# Patient Record
Sex: FEMALE | Race: WHITE | Employment: OTHER | URBAN - METROPOLITAN AREA
[De-identification: names, ages, dates, MRNs, and addresses within clinical notes are randomized per-mention and may not be internally consistent; named-entity substitution may affect disease eponyms.]

---

## 2017-07-19 ENCOUNTER — HOSPITAL ENCOUNTER (OUTPATIENT)
Dept: MAMMOGRAPHY | Age: 82
Discharge: HOME | End: 2017-07-19
Payer: MEDICARE

## 2017-07-19 DIAGNOSIS — Z12.31 VISIT FOR SCREENING MAMMOGRAM: ICD-10-CM

## 2017-07-19 PROCEDURE — G0202 SCR MAMMO BI INCL CAD: HCPCS

## 2018-02-19 ENCOUNTER — HOSPITAL ENCOUNTER (OUTPATIENT)
Dept: GENERAL RADIOLOGY | Age: 83
Discharge: HOME | End: 2018-02-19
Payer: MEDICARE

## 2018-02-19 DIAGNOSIS — R05.9 COUGH: ICD-10-CM

## 2018-02-19 PROCEDURE — 71046 X-RAY EXAM CHEST 2 VIEWS: CPT

## 2018-07-23 ENCOUNTER — HOSPITAL ENCOUNTER (OUTPATIENT)
Dept: MAMMOGRAPHY | Age: 83
Discharge: HOME | End: 2018-07-23

## 2018-07-23 DIAGNOSIS — Z12.39 SCREENING BREAST EXAMINATION: ICD-10-CM

## 2018-07-23 PROCEDURE — 77067 SCR MAMMO BI INCL CAD: CPT

## 2019-07-25 ENCOUNTER — HOSPITAL ENCOUNTER (OUTPATIENT)
Dept: MAMMOGRAPHY | Age: 84
Discharge: HOME | End: 2019-07-25
Payer: MEDICARE

## 2019-07-25 DIAGNOSIS — Z12.39 BREAST SCREENING, UNSPECIFIED: ICD-10-CM

## 2019-07-25 PROCEDURE — 77067 SCR MAMMO BI INCL CAD: CPT

## 2020-07-27 ENCOUNTER — HOSPITAL ENCOUNTER (OUTPATIENT)
Dept: MAMMOGRAPHY | Age: 85
Discharge: HOME | End: 2020-07-27
Payer: MEDICARE

## 2020-07-27 DIAGNOSIS — Z12.31 VISIT FOR SCREENING MAMMOGRAM: ICD-10-CM

## 2020-07-27 PROCEDURE — 77067 SCR MAMMO BI INCL CAD: CPT | Performed by: FAMILY MEDICINE

## 2021-07-07 ENCOUNTER — TRANSCRIBE ORDER (OUTPATIENT)
Dept: SCHEDULING | Age: 86
End: 2021-07-07

## 2021-07-07 DIAGNOSIS — Z12.31 SCREENING MAMMOGRAM FOR HIGH-RISK PATIENT: Primary | ICD-10-CM

## 2021-07-28 ENCOUNTER — HOSPITAL ENCOUNTER (OUTPATIENT)
Dept: MAMMOGRAPHY | Age: 86
Discharge: HOME | End: 2021-07-28
Attending: FAMILY MEDICINE
Payer: MEDICARE

## 2021-07-28 DIAGNOSIS — Z12.31 SCREENING MAMMOGRAM FOR HIGH-RISK PATIENT: ICD-10-CM

## 2021-07-28 PROCEDURE — 77067 SCR MAMMO BI INCL CAD: CPT

## 2022-06-29 ENCOUNTER — TRANSCRIBE ORDER (OUTPATIENT)
Dept: SCHEDULING | Age: 87
End: 2022-06-29

## 2022-06-29 DIAGNOSIS — M85.9 DISORDER OF BONE DENSITY AND STRUCTURE, UNSPECIFIED: Primary | ICD-10-CM

## 2022-07-05 ENCOUNTER — TRANSCRIBE ORDER (OUTPATIENT)
Dept: SCHEDULING | Age: 87
End: 2022-07-05

## 2022-07-05 DIAGNOSIS — M85.89 OSTEOPENIA OF MULTIPLE SITES: Primary | ICD-10-CM

## 2022-07-06 ENCOUNTER — TRANSCRIBE ORDER (OUTPATIENT)
Dept: SCHEDULING | Age: 87
End: 2022-07-06

## 2022-07-06 DIAGNOSIS — Z12.31 ENCOUNTER FOR MAMMOGRAM TO ESTABLISH BASELINE MAMMOGRAM: Primary | ICD-10-CM

## 2022-07-13 ENCOUNTER — HOSPITAL ENCOUNTER (OUTPATIENT)
Dept: MAMMOGRAPHY | Age: 87
Discharge: HOME | End: 2022-07-13
Attending: INTERNAL MEDICINE
Payer: MEDICARE

## 2022-07-13 DIAGNOSIS — M85.89 OSTEOPENIA OF MULTIPLE SITES: ICD-10-CM

## 2022-07-13 PROCEDURE — 77080 DXA BONE DENSITY AXIAL: CPT

## 2022-08-11 ENCOUNTER — HOSPITAL ENCOUNTER (OUTPATIENT)
Dept: MAMMOGRAPHY | Age: 87
Discharge: HOME | End: 2022-08-11
Attending: INTERNAL MEDICINE
Payer: MEDICARE

## 2022-08-11 DIAGNOSIS — Z12.31 ENCOUNTER FOR MAMMOGRAM TO ESTABLISH BASELINE MAMMOGRAM: ICD-10-CM

## 2022-08-11 PROCEDURE — 77063 BREAST TOMOSYNTHESIS BI: CPT

## 2023-02-01 ENCOUNTER — HOSPITAL ENCOUNTER (OUTPATIENT)
Dept: GENERAL RADIOLOGY | Age: 88
Discharge: HOME | End: 2023-02-01
Attending: INTERNAL MEDICINE
Payer: MEDICARE

## 2023-02-01 ENCOUNTER — TRANSCRIBE ORDER (OUTPATIENT)
Dept: GENERAL RADIOLOGY | Age: 88
End: 2023-02-01

## 2023-02-01 DIAGNOSIS — J40 BRONCHITIS: ICD-10-CM

## 2023-02-01 DIAGNOSIS — J40 BRONCHITIS: Primary | ICD-10-CM

## 2023-02-01 PROCEDURE — 71046 X-RAY EXAM CHEST 2 VIEWS: CPT

## 2023-02-02 ENCOUNTER — HOSPITAL ENCOUNTER (OUTPATIENT)
Dept: CT IMAGING | Age: 88
Discharge: HOME | End: 2023-02-02
Attending: INTERNAL MEDICINE
Payer: MEDICARE

## 2023-02-02 ENCOUNTER — APPOINTMENT (OUTPATIENT)
Dept: GENERAL RADIOLOGY | Age: 88
DRG: 177 | End: 2023-02-02
Attending: EMERGENCY MEDICINE
Payer: MEDICARE

## 2023-02-02 ENCOUNTER — HOSPITAL ENCOUNTER (INPATIENT)
Age: 88
LOS: 5 days | Discharge: HOME | DRG: 177 | End: 2023-02-07
Attending: EMERGENCY MEDICINE | Admitting: FAMILY MEDICINE
Payer: MEDICARE

## 2023-02-02 ENCOUNTER — TRANSCRIBE ORDER (OUTPATIENT)
Dept: SCHEDULING | Age: 88
End: 2023-02-02

## 2023-02-02 DIAGNOSIS — Z78.9 FAILURE OF OUTPATIENT TREATMENT: ICD-10-CM

## 2023-02-02 DIAGNOSIS — J90 BILATERAL PLEURAL EFFUSION: ICD-10-CM

## 2023-02-02 DIAGNOSIS — R79.82 CRP ELEVATED: ICD-10-CM

## 2023-02-02 DIAGNOSIS — R93.89 ABNORMAL CHEST X-RAY: ICD-10-CM

## 2023-02-02 DIAGNOSIS — U09.9 POST-ACUTE SEQUELAE OF COVID-19 (PASC): ICD-10-CM

## 2023-02-02 DIAGNOSIS — R93.89 ABNORMAL CHEST X-RAY: Primary | ICD-10-CM

## 2023-02-02 DIAGNOSIS — R79.89 ELEVATED BRAIN NATRIURETIC PEPTIDE (BNP) LEVEL: ICD-10-CM

## 2023-02-02 DIAGNOSIS — J18.9 ATYPICAL PNEUMONIA: Primary | ICD-10-CM

## 2023-02-02 PROBLEM — C91.10 CLL (CHRONIC LYMPHOCYTIC LEUKEMIA) (HCC): Status: ACTIVE | Noted: 2023-02-02

## 2023-02-02 LAB
ALBUMIN SERPL-MCNC: 3.9 G/DL (ref 3.5–5)
ALBUMIN/GLOB SERPL: 1.6 (ref 1.1–2.2)
ALP SERPL-CCNC: 64 U/L (ref 45–117)
ALT SERPL-CCNC: 25 U/L (ref 12–78)
ANION GAP SERPL CALC-SCNC: 9 MMOL/L (ref 5–15)
APPEARANCE UR: CLEAR
AST SERPL-CCNC: 35 U/L (ref 15–37)
BACTERIA URNS QL MICRO: NEGATIVE /HPF
BASOPHILS # BLD: 0 K/UL (ref 0–0.1)
BASOPHILS NFR BLD: 0 % (ref 0–1)
BILIRUB SERPL-MCNC: 0.8 MG/DL (ref 0.2–1)
BILIRUB UR QL: NEGATIVE
BNP SERPL-MCNC: 1062 PG/ML (ref 0–450)
BUN SERPL-MCNC: 18 MG/DL (ref 6–20)
BUN/CREAT SERPL: 23 (ref 12–20)
CALCIUM SERPL-MCNC: 9.9 MG/DL (ref 8.5–10.1)
CHLORIDE SERPL-SCNC: 93 MMOL/L (ref 97–108)
CO2 SERPL-SCNC: 28 MMOL/L (ref 21–32)
COLOR UR: ABNORMAL
CREAT SERPL-MCNC: 0.78 MG/DL (ref 0.55–1.02)
CRP SERPL-MCNC: 14.83 MG/DL
DIFFERENTIAL METHOD BLD: ABNORMAL
EOSINOPHIL # BLD: 0 K/UL (ref 0–0.4)
EOSINOPHIL NFR BLD: 0 % (ref 0–7)
EPITH CASTS URNS QL MICRO: ABNORMAL /LPF
ERYTHROCYTE [DISTWIDTH] IN BLOOD BY AUTOMATED COUNT: 15.2 % (ref 11.5–14.5)
GLOBULIN SER CALC-MCNC: 2.5 G/DL (ref 2–4)
GLUCOSE SERPL-MCNC: 134 MG/DL (ref 65–100)
GLUCOSE UR STRIP.AUTO-MCNC: NEGATIVE MG/DL
HCT VFR BLD AUTO: 34 % (ref 35–47)
HGB BLD-MCNC: 11.4 G/DL (ref 11.5–16)
HGB UR QL STRIP: NEGATIVE
IMM GRANULOCYTES # BLD AUTO: 0 K/UL (ref 0–0.04)
IMM GRANULOCYTES NFR BLD AUTO: 0 % (ref 0–0.5)
KETONES UR QL STRIP.AUTO: NEGATIVE MG/DL
LACTATE BLD-SCNC: 1.17 MMOL/L (ref 0.4–2)
LEUKOCYTE ESTERASE UR QL STRIP.AUTO: ABNORMAL
LYMPHOCYTES # BLD: 1.5 K/UL (ref 0.8–3.5)
LYMPHOCYTES NFR BLD: 28 % (ref 12–49)
MCH RBC QN AUTO: 27.7 PG (ref 26–34)
MCHC RBC AUTO-ENTMCNC: 33.5 G/DL (ref 30–36.5)
MCV RBC AUTO: 82.5 FL (ref 80–99)
MONOCYTES # BLD: 0.3 K/UL (ref 0–1)
MONOCYTES NFR BLD: 7 % (ref 5–13)
NEUTS SEG # BLD: 3.4 K/UL (ref 1.8–8)
NEUTS SEG NFR BLD: 65 % (ref 32–75)
NITRITE UR QL STRIP.AUTO: NEGATIVE
NRBC # BLD: 0 K/UL (ref 0–0.01)
NRBC BLD-RTO: 0 PER 100 WBC
PH UR STRIP: 5.5 (ref 5–8)
PLATELET # BLD AUTO: 265 K/UL (ref 150–400)
PMV BLD AUTO: 9.2 FL (ref 8.9–12.9)
POTASSIUM SERPL-SCNC: 3.7 MMOL/L (ref 3.5–5.1)
PROT SERPL-MCNC: 6.4 G/DL (ref 6.4–8.2)
PROT UR STRIP-MCNC: NEGATIVE MG/DL
RBC # BLD AUTO: 4.12 M/UL (ref 3.8–5.2)
RBC #/AREA URNS HPF: ABNORMAL /HPF (ref 0–5)
SODIUM SERPL-SCNC: 130 MMOL/L (ref 136–145)
SP GR UR REFRACTOMETRY: 1.02 (ref 1–1.03)
TROPONIN I SERPL HS-MCNC: 9 NG/L (ref 0–51)
UR CULT HOLD, URHOLD: NORMAL
UROBILINOGEN UR QL STRIP.AUTO: 1 EU/DL (ref 0.2–1)
WBC # BLD AUTO: 5.3 K/UL (ref 3.6–11)
WBC URNS QL MICRO: ABNORMAL /HPF (ref 0–4)

## 2023-02-02 PROCEDURE — 85025 COMPLETE CBC W/AUTO DIFF WBC: CPT

## 2023-02-02 PROCEDURE — 80053 COMPREHEN METABOLIC PANEL: CPT

## 2023-02-02 PROCEDURE — 87040 BLOOD CULTURE FOR BACTERIA: CPT

## 2023-02-02 PROCEDURE — 81001 URINALYSIS AUTO W/SCOPE: CPT

## 2023-02-02 PROCEDURE — 71250 CT THORAX DX C-: CPT

## 2023-02-02 PROCEDURE — 74011250636 HC RX REV CODE- 250/636: Performed by: EMERGENCY MEDICINE

## 2023-02-02 PROCEDURE — 36415 COLL VENOUS BLD VENIPUNCTURE: CPT

## 2023-02-02 PROCEDURE — 86140 C-REACTIVE PROTEIN: CPT

## 2023-02-02 PROCEDURE — 65270000046 HC RM TELEMETRY

## 2023-02-02 PROCEDURE — 83880 ASSAY OF NATRIURETIC PEPTIDE: CPT

## 2023-02-02 PROCEDURE — 96375 TX/PRO/DX INJ NEW DRUG ADDON: CPT

## 2023-02-02 PROCEDURE — 83605 ASSAY OF LACTIC ACID: CPT

## 2023-02-02 PROCEDURE — 84484 ASSAY OF TROPONIN QUANT: CPT

## 2023-02-02 PROCEDURE — 99285 EMERGENCY DEPT VISIT HI MDM: CPT

## 2023-02-02 PROCEDURE — 74011000250 HC RX REV CODE- 250: Performed by: EMERGENCY MEDICINE

## 2023-02-02 PROCEDURE — 93005 ELECTROCARDIOGRAM TRACING: CPT

## 2023-02-02 PROCEDURE — 96365 THER/PROPH/DIAG IV INF INIT: CPT

## 2023-02-02 RX ADMIN — CEFTRIAXONE SODIUM 1 G: 1 INJECTION, POWDER, FOR SOLUTION INTRAMUSCULAR; INTRAVENOUS at 22:01

## 2023-02-02 RX ADMIN — AZITHROMYCIN MONOHYDRATE 500 MG: 500 INJECTION, POWDER, LYOPHILIZED, FOR SOLUTION INTRAVENOUS at 22:03

## 2023-02-03 LAB
ANION GAP SERPL CALC-SCNC: 7 MMOL/L (ref 5–15)
ATRIAL RATE: 83 BPM
BASOPHILS # BLD: 0 K/UL (ref 0–0.1)
BASOPHILS NFR BLD: 0 % (ref 0–1)
BUN SERPL-MCNC: 12 MG/DL (ref 6–20)
BUN/CREAT SERPL: 17 (ref 12–20)
CALCIUM SERPL-MCNC: 9.3 MG/DL (ref 8.5–10.1)
CALCULATED P AXIS, ECG09: 62 DEGREES
CALCULATED R AXIS, ECG10: 19 DEGREES
CHLORIDE SERPL-SCNC: 97 MMOL/L (ref 97–108)
CO2 SERPL-SCNC: 28 MMOL/L (ref 21–32)
CREAT SERPL-MCNC: 0.72 MG/DL (ref 0.55–1.02)
DIAGNOSIS, 93000: NORMAL
DIFFERENTIAL METHOD BLD: ABNORMAL
EOSINOPHIL # BLD: 0 K/UL (ref 0–0.4)
EOSINOPHIL NFR BLD: 0 % (ref 0–7)
ERYTHROCYTE [DISTWIDTH] IN BLOOD BY AUTOMATED COUNT: 15.2 % (ref 11.5–14.5)
GLUCOSE SERPL-MCNC: 166 MG/DL (ref 65–100)
HCT VFR BLD AUTO: 29.9 % (ref 35–47)
HGB BLD-MCNC: 9.8 G/DL (ref 11.5–16)
IMM GRANULOCYTES # BLD AUTO: 0 K/UL (ref 0–0.04)
IMM GRANULOCYTES NFR BLD AUTO: 0 % (ref 0–0.5)
LYMPHOCYTES # BLD: 1 K/UL (ref 0.8–3.5)
LYMPHOCYTES NFR BLD: 21 % (ref 12–49)
MCH RBC QN AUTO: 27.4 PG (ref 26–34)
MCHC RBC AUTO-ENTMCNC: 32.8 G/DL (ref 30–36.5)
MCV RBC AUTO: 83.5 FL (ref 80–99)
MONOCYTES # BLD: 0.2 K/UL (ref 0–1)
MONOCYTES NFR BLD: 5 % (ref 5–13)
NEUTS SEG # BLD: 3.4 K/UL (ref 1.8–8)
NEUTS SEG NFR BLD: 74 % (ref 32–75)
NRBC # BLD: 0 K/UL (ref 0–0.01)
NRBC BLD-RTO: 0 PER 100 WBC
P-R INTERVAL, ECG05: 200 MS
PLATELET # BLD AUTO: 252 K/UL (ref 150–400)
PMV BLD AUTO: 9.3 FL (ref 8.9–12.9)
POTASSIUM SERPL-SCNC: 3.3 MMOL/L (ref 3.5–5.1)
PROCALCITONIN SERPL-MCNC: 0.05 NG/ML
Q-T INTERVAL, ECG07: 354 MS
QRS DURATION, ECG06: 68 MS
QTC CALCULATION (BEZET), ECG08: 415 MS
RBC # BLD AUTO: 3.58 M/UL (ref 3.8–5.2)
SODIUM SERPL-SCNC: 132 MMOL/L (ref 136–145)
VENTRICULAR RATE, ECG03: 83 BPM
WBC # BLD AUTO: 4.7 K/UL (ref 3.6–11)

## 2023-02-03 PROCEDURE — 74011000258 HC RX REV CODE- 258: Performed by: FAMILY MEDICINE

## 2023-02-03 PROCEDURE — 84145 PROCALCITONIN (PCT): CPT

## 2023-02-03 PROCEDURE — 74011250637 HC RX REV CODE- 250/637: Performed by: EMERGENCY MEDICINE

## 2023-02-03 PROCEDURE — 94761 N-INVAS EAR/PLS OXIMETRY MLT: CPT

## 2023-02-03 PROCEDURE — 74011000250 HC RX REV CODE- 250: Performed by: STUDENT IN AN ORGANIZED HEALTH CARE EDUCATION/TRAINING PROGRAM

## 2023-02-03 PROCEDURE — 74011000250 HC RX REV CODE- 250: Performed by: FAMILY MEDICINE

## 2023-02-03 PROCEDURE — 36415 COLL VENOUS BLD VENIPUNCTURE: CPT

## 2023-02-03 PROCEDURE — 74011636637 HC RX REV CODE- 636/637: Performed by: STUDENT IN AN ORGANIZED HEALTH CARE EDUCATION/TRAINING PROGRAM

## 2023-02-03 PROCEDURE — 65270000046 HC RM TELEMETRY

## 2023-02-03 PROCEDURE — 74011250636 HC RX REV CODE- 250/636: Performed by: FAMILY MEDICINE

## 2023-02-03 PROCEDURE — 80048 BASIC METABOLIC PNL TOTAL CA: CPT

## 2023-02-03 PROCEDURE — 74011250637 HC RX REV CODE- 250/637: Performed by: STUDENT IN AN ORGANIZED HEALTH CARE EDUCATION/TRAINING PROGRAM

## 2023-02-03 PROCEDURE — 85025 COMPLETE CBC W/AUTO DIFF WBC: CPT

## 2023-02-03 PROCEDURE — 74011250637 HC RX REV CODE- 250/637: Performed by: FAMILY MEDICINE

## 2023-02-03 RX ORDER — PREDNISONE 1 MG/1
1 TABLET ORAL EVERY OTHER DAY
Status: DISCONTINUED | OUTPATIENT
Start: 2023-02-03 | End: 2023-02-04

## 2023-02-03 RX ORDER — ACETAMINOPHEN 500 MG
1000 TABLET ORAL
Status: COMPLETED | OUTPATIENT
Start: 2023-02-03 | End: 2023-02-03

## 2023-02-03 RX ORDER — ACETAMINOPHEN 325 MG/1
650 TABLET ORAL
Status: DISCONTINUED | OUTPATIENT
Start: 2023-02-03 | End: 2023-02-06

## 2023-02-03 RX ORDER — ACETAMINOPHEN 325 MG/1
650 TABLET ORAL
Status: DISCONTINUED | OUTPATIENT
Start: 2023-02-03 | End: 2023-02-03

## 2023-02-03 RX ORDER — FUROSEMIDE 10 MG/ML
20 INJECTION INTRAMUSCULAR; INTRAVENOUS DAILY
Status: DISCONTINUED | OUTPATIENT
Start: 2023-02-04 | End: 2023-02-04

## 2023-02-03 RX ORDER — ENOXAPARIN SODIUM 100 MG/ML
40 INJECTION SUBCUTANEOUS DAILY
Status: DISCONTINUED | OUTPATIENT
Start: 2023-02-04 | End: 2023-02-07 | Stop reason: HOSPADM

## 2023-02-03 RX ORDER — FENOFIBRATE 160 MG/1
160 TABLET ORAL DAILY
Status: DISCONTINUED | OUTPATIENT
Start: 2023-02-04 | End: 2023-02-07 | Stop reason: HOSPADM

## 2023-02-03 RX ORDER — LOSARTAN POTASSIUM 50 MG/1
100 TABLET ORAL DAILY
Status: DISCONTINUED | OUTPATIENT
Start: 2023-02-04 | End: 2023-02-04

## 2023-02-03 RX ORDER — SODIUM CHLORIDE 0.9 % (FLUSH) 0.9 %
5-40 SYRINGE (ML) INJECTION EVERY 8 HOURS
Status: DISCONTINUED | OUTPATIENT
Start: 2023-02-03 | End: 2023-02-07 | Stop reason: HOSPADM

## 2023-02-03 RX ORDER — PANTOPRAZOLE SODIUM 40 MG/1
40 TABLET, DELAYED RELEASE ORAL
Status: DISCONTINUED | OUTPATIENT
Start: 2023-02-03 | End: 2023-02-07 | Stop reason: HOSPADM

## 2023-02-03 RX ORDER — ONDANSETRON 2 MG/ML
4 INJECTION INTRAMUSCULAR; INTRAVENOUS
Status: DISCONTINUED | OUTPATIENT
Start: 2023-02-03 | End: 2023-02-07 | Stop reason: HOSPADM

## 2023-02-03 RX ORDER — ONDANSETRON 4 MG/1
4 TABLET, ORALLY DISINTEGRATING ORAL
Status: DISCONTINUED | OUTPATIENT
Start: 2023-02-03 | End: 2023-02-07 | Stop reason: HOSPADM

## 2023-02-03 RX ORDER — FUROSEMIDE 10 MG/ML
20 INJECTION INTRAMUSCULAR; INTRAVENOUS ONCE
Status: COMPLETED | OUTPATIENT
Start: 2023-02-03 | End: 2023-02-03

## 2023-02-03 RX ORDER — GUAIFENESIN 100 MG/5ML
81 LIQUID (ML) ORAL DAILY
Status: DISCONTINUED | OUTPATIENT
Start: 2023-02-04 | End: 2023-02-07 | Stop reason: HOSPADM

## 2023-02-03 RX ORDER — POLYETHYLENE GLYCOL 3350 17 G/17G
17 POWDER, FOR SOLUTION ORAL
Status: DISCONTINUED | OUTPATIENT
Start: 2023-02-03 | End: 2023-02-07 | Stop reason: HOSPADM

## 2023-02-03 RX ORDER — SODIUM CHLORIDE 0.9 % (FLUSH) 0.9 %
5-40 SYRINGE (ML) INJECTION AS NEEDED
Status: DISCONTINUED | OUTPATIENT
Start: 2023-02-03 | End: 2023-02-07 | Stop reason: HOSPADM

## 2023-02-03 RX ORDER — ACETAMINOPHEN 650 MG/1
650 SUPPOSITORY RECTAL
Status: DISCONTINUED | OUTPATIENT
Start: 2023-02-03 | End: 2023-02-06

## 2023-02-03 RX ORDER — ACETAMINOPHEN 500 MG
500 TABLET ORAL
Status: DISCONTINUED | OUTPATIENT
Start: 2023-02-03 | End: 2023-02-03

## 2023-02-03 RX ADMIN — ACETAMINOPHEN 650 MG: 325 TABLET ORAL at 18:52

## 2023-02-03 RX ADMIN — CEFEPIME 2 G: 2 INJECTION, POWDER, FOR SOLUTION INTRAVENOUS at 10:21

## 2023-02-03 RX ADMIN — PANTOPRAZOLE SODIUM 40 MG: 40 TABLET, DELAYED RELEASE ORAL at 22:33

## 2023-02-03 RX ADMIN — SODIUM CHLORIDE, PRESERVATIVE FREE 10 ML: 5 INJECTION INTRAVENOUS at 22:37

## 2023-02-03 RX ADMIN — ALUMINUM HYDROXIDE AND MAGNESIUM CARBONATE 15 ML: 95; 358 LIQUID ORAL at 22:33

## 2023-02-03 RX ADMIN — ACETAMINOPHEN 1000 MG: 500 TABLET, FILM COATED ORAL at 02:27

## 2023-02-03 RX ADMIN — ACETAMINOPHEN 650 MG: 325 TABLET ORAL at 12:33

## 2023-02-03 RX ADMIN — POLYETHYLENE GLYCOL 3350 17 G: 17 POWDER, FOR SOLUTION ORAL at 22:33

## 2023-02-03 RX ADMIN — CEFEPIME 2 G: 2 INJECTION, POWDER, FOR SOLUTION INTRAVENOUS at 22:33

## 2023-02-03 RX ADMIN — SODIUM CHLORIDE, PRESERVATIVE FREE 10 ML: 5 INJECTION INTRAVENOUS at 17:49

## 2023-02-03 RX ADMIN — PREDNISONE 1 MG: 1 TABLET ORAL at 17:49

## 2023-02-03 RX ADMIN — FUROSEMIDE 20 MG: 10 INJECTION, SOLUTION INTRAMUSCULAR; INTRAVENOUS at 10:21

## 2023-02-03 NOTE — PROGRESS NOTES
Day #1 of cefepime  Indication:  CAP  Current regimen:  1 g Q8H  Abx regimen: cefepime  Recent Labs     23  1938   WBC 5.3   CREA 0.78   BUN 18     Est CrCl: 36 ml/min; UO: --- ml/kg/hr  Temp (24hrs), Av.5 °F (36.9 °C), Min:97.3 °F (36.3 °C), Max:100.6 °F (38.1 °C)      Plan: Change to cefepime 2 g Q12H

## 2023-02-03 NOTE — ED NOTES
1300: [facility] on scene to transport patient. 1315: Spoke to hospitalist in regards to antibiotics. Dr. Brie Torres is aware. 1317: Verbal report given to [facility]. [facility] in possession of EMTALA, ED summary, ED facesheet. Patient is resting comfortably on stretcher. 1327:Patient has now left the department.

## 2023-02-03 NOTE — ED NOTES
Verbal shift change report given to Lilly Walker RN (oncoming nurse) by Cristina Joseph RN (offgoing nurse). Report included the following information SBAR, ED Summary, MAR, and Recent Results.

## 2023-02-03 NOTE — PROGRESS NOTES
Pt in bed A/O x4, no distress noted. IV site clean dry and intact. All meds given during shift per MD orders. No complaint of pain during shift. Pt complained of fever with temp 100.3F. Tp given antipyretic meds. Pt stated understanding of education to call for pain medication as needed. Will continue to monitor. Awaiting oncoming nurse for shift change report.

## 2023-02-03 NOTE — ED TRIAGE NOTES
Patient reports went to see her doctor today and was diagnosed with pneumonia in both lungs after having CT. This morning had fever 102, took Tylenol. Patient has Hx of CLL. Patient was diagnosed with bronchitis and UTI, takes Doxy since Monday. Patient is ambulatory in triage. Denies CP, SOB.

## 2023-02-03 NOTE — ED NOTES
TRANSFER - OUT REPORT:    Verbal report given to IP RN(name) on Jemma Harden  being transferred to (unit) for routine progression of care       Report consisted of patients Situation, Background, Assessment and   Recommendations(SBAR). Information from the following report(s) SBAR, ED Summary, MAR, and Recent Results was reviewed with the receiving nurse. Lines:   Peripheral IV 02/02/23 Anterior;Distal;Right Wrist (Active)       Peripheral IV 02/02/23 Left Wrist (Active)   Site Assessment Clean, dry, & intact 02/02/23 2135   Phlebitis Assessment 0 02/02/23 2135   Infiltration Assessment 0 02/02/23 2135   Dressing Status Clean, dry, & intact 02/02/23 2135   Dressing Type Transparent 02/02/23 2135   Hub Color/Line Status Pink 02/02/23 2135   Action Taken Blood drawn 02/02/23 2135        Opportunity for questions and clarification was provided.       Patient transported with:   Monitor  O2 @ 2 liters

## 2023-02-03 NOTE — ED NOTES
Family at bedside. Family brought food for patient consisting of a banana, yogurt, blueberry muffin, and tea.

## 2023-02-03 NOTE — ED NOTES
Attempted to administer antibiotics at this time, patient's family members requesting to speak to MD Wyatt Hollins prior. MD Wyatt Hollins aware.

## 2023-02-03 NOTE — H&P
[address] [facility] Adult  Hospitalist Group  History and Physical    Date of Service:  2/3/2023  Primary Care Provider: Nasim Dinero MD  Source of information: The patient, Chart review, and Spouse/family member    Chief Complaint: No chief complaint on file. History of Presenting Illness:   Darien Cruz is a 80 y.o. female who presents with fevers. 80-year-old  female with past medical history of CLL who follows with oncology and COVID diagnosed 1 month ago who was transferred here for the above. Patient reports that she has been having on and off fevers for about a week. She reports that these fevers are at night or in the morning and if ranged to a temperature max of 102 °F.  She reports she had COVID 1 month ago and was told by her primary doctor that if she had fevers and shortness of breath to go to an ER. She reports that she has been having increased cough along with her fevers, for about 3 weeks now. She denies any hemoptysis. She does report some productive cough since having had COVID. She and her family report that she has been started on doxycycline by her primary care provider for concern for pneumonia and possible UTI. She did have a CT yesterday which showed patchy airspace disease and bilateral pleural effusions. She reports increased shortness of breath with exertion but not at rest.  She denies any hypoxia, syncope, chest pain, palpitations. At the time of my exam, patient is laying comfortably in bed with family at bedside. No acute complaints at this time. Patient was found to have elevated BNP at 1062 and, as mentioned before, CT showing scarring versus inflammatory versus infectious and pleural effusion.     The patient denies any headache, blurry vision, sore throat, trouble swallowing, trouble with speech, chest pain, SOB, cough, fever, chills, N/V/D, abd pain, urinary symptoms, constipation, recent travels, sick contacts, focal or generalized neurological symptoms, falls, injuries, rashes, contact with COVID-19 diagnosed patients, hematemesis, melena, hemoptysis, hematuria, rashes, denies starting any new medications and denies any other concerns or problems besides as mentioned above. REVIEW OF SYSTEMS:  A comprehensive review of systems was negative except for that written in the History of Present Illness. Past Medical History:   Diagnosis Date    CLL (chronic lymphocytic leukemia) ([facility] [address]     History of chemotherapy     For chronic lymphacytic leukemia    Menopause 1986      Past Surgical History:   Procedure Laterality Date    HX BREAST BIOPSY Bilateral     surgical-benign    HX HYSTERECTOMY  1986    HX OOPHORECTOMY Bilateral 1986     Prior to Admission medications    Not on File     Allergies   Allergen Reactions    Penicillins Anaphylaxis    Ciprofloxacin Myalgia      History reviewed. No pertinent family history. Social History:  reports that she has never smoked. She has never used smokeless tobacco.   Social Determinants of Health     Tobacco Use: Low Risk     Smoking Tobacco Use: Never    Smokeless Tobacco Use: Never    Passive Exposure: Not on file   Alcohol Use: Not on file   Financial Resource Strain: Not on file   Food Insecurity: Not on file   Transportation Needs: Not on file   Physical Activity: Not on file   Stress: Not on file   Social Connections: Not on file   Intimate Partner Violence: Not on file   Depression: Not on file   Housing Stability: Not on file        Medications were reconciled to the best of my ability given all available resources at the time of admission. Route is PO if not otherwise noted. Family and social history were personally reviewed, all pertinent and relevant details are outlined as above.     Objective:   Visit Vitals  /60   Pulse 95   Temp (!) 102 °F (38.9 °C)   Resp 29   Ht 4' 11\" (1.499 m)   Wt 53.8 kg (118 lb 9.7 oz)   SpO2 97%   BMI 23.96 kg/m²    O2 Flow Rate (L/min): 2 l/min O2 Device: Nasal cannula    PHYSICAL EXAM:   General: Alert x oriented x 3, awake, no acute distress,   HEENT: PEERL, EOMI, moist mucus membranes  Neck: Supple, no JVD, no meningeal signs  Chest: Coarse breath sounds with bilateral crackles. No wheezing. No rhonchi. No respiratory distress. No tachypnea.   CVS: RRR, S1 S2 heard, no murmurs/rubs/gallops  Abd: Soft, non-tender, non-distended, +bowel sounds   Ext: No clubbing, no cyanosis, no edema  Neuro/Psych: Pleasant mood and affect, CN 2-12 grossly intact, sensory grossly within normal limit, Strength 5/5 in all extremities, DTR 1+ x 4  Cap refill: Brisk, less than 3 seconds  Pulses: 2+, symmetric in all extremities  Skin: Warm, dry, without rashes or lesions    Data Review:   I have independently reviewed and interpreted patient's lab and all other diagnostic data    Abnormal Labs Reviewed   CBC WITH AUTOMATED DIFF - Abnormal; Notable for the following components:       Result Value    HGB 11.4 (*)     HCT 34.0 (*)     RDW 15.2 (*)     All other components within normal limits   METABOLIC PANEL, COMPREHENSIVE - Abnormal; Notable for the following components:    Sodium 130 (*)     Chloride 93 (*)     Glucose 134 (*)     BUN/Creatinine ratio 23 (*)     All other components within normal limits   NT-PRO BNP - Abnormal; Notable for the following components:    NT pro-BNP 1,062 (*)     All other components within normal limits   URINALYSIS W/MICROSCOPIC - Abnormal; Notable for the following components:    Leukocyte Esterase SMALL (*)     Epithelial cells MODERATE (*)     All other components within normal limits   C REACTIVE PROTEIN, QT - Abnormal; Notable for the following components:    C-Reactive protein 14.83 (*)     All other components within normal limits   CBC WITH AUTOMATED DIFF - Abnormal; Notable for the following components:    RBC 3.58 (*)     HGB 9.8 (*)     HCT 29.9 (*)     RDW 15.2 (*)     All other components within normal limits   METABOLIC PANEL, BASIC - Abnormal; Notable for the following components:    Sodium 132 (*)     Potassium 3.3 (*)     Glucose 166 (*)     All other components within normal limits       All Micro Results       Procedure Component Value Units Date/Time    CULTURE, BLOOD, PAIRED [[id number]] Collected: 02/02/23 2132    Order Status: Sent Specimen: Blood Updated: 02/03/23 0730    CULTURE, BLOOD, PAIRED [[id number]]     Order Status: Canceled Specimen: Blood     URINE CULTURE HOLD SAMPLE [[id number]] Collected: 02/02/23 1956    Order Status: Completed Specimen: Urine Updated: 02/02/23 2020     Urine culture hold       Urine on hold in Microbiology dept for 2 days. If unpreserved urine is submitted, it cannot be used for addtional testing after 24 hours, recollection will be required. COVID-19 RAPID TEST [[id number]]     Order Status: Canceled             IMAGING:   No orders to display        ECG/ECHO:    Results for orders placed or performed during the hospital encounter of 02/02/23   EKG, 12 LEAD, INITIAL   Result Value Ref Range    Ventricular Rate 83 BPM    Atrial Rate 83 BPM    P-R Interval 200 ms    QRS Duration 68 ms    Q-T Interval 354 ms    QTC Calculation (Bezet) 415 ms    Calculated P Axis 62 degrees    Calculated R Axis 19 degrees    Diagnosis       Normal sinus rhythm  Nonspecific ST abnormality    No previous ECGs available  Confirmed by Tatum Camp M.D., Janessa Prophet ([id number]) on 2/3/2023 4:02:21 PM            Notes reviewed from all clinical/nonclinical/nursing services involved in patient's clinical care. Care coordination discussions were held with appropriate clinical/nonclinical/ nursing providers based on care coordination needs. Assessment:   Given the patient's current clinical presentation, there is a high level of concern for decompensation if discharged from the emergency department.  Complex decision making was performed, which includes reviewing the patient's available past medical records, laboratory results, and imaging studies. 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)        Plan:     Likely atypical CAP with failed outpatient treatment  Previous COVID infection likely influencing  Patient was given cefepime we will continue on cefepime for now  Procalcitonin ordered  Continuous pulse ox  Monitor with labs  CT shows bilateral groundglass opacities as well  Consider sequelae of COVID    Bilateral pleural effusion, some concern for heart failure  Elevated BNP  Echocardiogram ordered  We will consider cardiology consult based on results  We will consider starting diuretics though patient at this time is not short of breath, not hypoxic, and not showing signs of overt fluid overload    CLL  Follows with [facility]  Recently saw her oncologist  Monitor with labs  CT shows worsening disease but her comparison is 2014  Continue patient's prednisone as ordered by her oncologist  Consider oncology consult for potentially worsening disease though I do not have a comparison that is within an acceptable timeframe    Previous COVID-19 infection  Patient reports that she has remained positive since her infection     Constipation  MiraLAX      DIET: ADULT DIET Regular   ISOLATION PRECAUTIONS: There are currently no Active Isolations  CODE STATUS: DNR   DVT PROPHYLAXIS: Lovenox  FUNCTIONAL STATUS PRIOR TO HOSPITALIZATION: Fully active and ambulatory; able to carry on all self-care without restriction. Ambulatory status/function: By self   EARLY MOBILITY ASSESSMENT: Recommend routine ambulation while hospitalized with the assistance of nursing staff  ANTICIPATED DISCHARGE: 24-48 hours.   ANTICIPATED DISPOSITION: Home  EMERGENCY CONTACT/SURROGATE DECISION MAKER: Daughter    CRITICAL CARE WAS PERFORMED FOR THIS ENCOUNTER: NO.      Signed By: Mona Cunningham MD     February 3, 2023         Please note that this dictation may have been completed with Dragon, the computer voice recognition software. Quite often unanticipated grammatical, syntax, homophones, and other interpretive errors are inadvertently transcribed by the computer software. Please disregard these errors. Please excuse any errors that have escaped final proofreading.

## 2023-02-03 NOTE — ED NOTES
This RN was speaking to family at bedside, and family began to discuss allergies. Family reported patient has allergies to penicillins. Allergy information was updated by this RN. It was found that antibiotic administered is related to penicillins. MD made aware of this. Patient shows no signs of distress at this time. Will continue to observe patient and progression of care.

## 2023-02-04 ENCOUNTER — APPOINTMENT (OUTPATIENT)
Dept: NON INVASIVE DIAGNOSTICS | Age: 88
DRG: 177 | End: 2023-02-04
Attending: STUDENT IN AN ORGANIZED HEALTH CARE EDUCATION/TRAINING PROGRAM
Payer: MEDICARE

## 2023-02-04 ENCOUNTER — APPOINTMENT (OUTPATIENT)
Dept: GENERAL RADIOLOGY | Age: 88
DRG: 177 | End: 2023-02-04
Attending: STUDENT IN AN ORGANIZED HEALTH CARE EDUCATION/TRAINING PROGRAM
Payer: MEDICARE

## 2023-02-04 LAB
ANION GAP SERPL CALC-SCNC: 7 MMOL/L (ref 5–15)
B PERT DNA SPEC QL NAA+PROBE: NOT DETECTED
BORDETELLA PARAPERTUSSIS PCR, BORPAR: NOT DETECTED
BUN SERPL-MCNC: 10 MG/DL (ref 6–20)
BUN/CREAT SERPL: 17 (ref 12–20)
C PNEUM DNA SPEC QL NAA+PROBE: NOT DETECTED
CALCIUM SERPL-MCNC: 9.5 MG/DL (ref 8.5–10.1)
CHLORIDE SERPL-SCNC: 95 MMOL/L (ref 97–108)
CO2 SERPL-SCNC: 26 MMOL/L (ref 21–32)
CREAT SERPL-MCNC: 0.58 MG/DL (ref 0.55–1.02)
FLUAV H1 2009 PAND RNA SPEC QL NAA+PROBE: NOT DETECTED
FLUAV H1 RNA SPEC QL NAA+PROBE: NOT DETECTED
FLUAV H3 RNA SPEC QL NAA+PROBE: NOT DETECTED
FLUAV SUBTYP SPEC NAA+PROBE: NOT DETECTED
FLUBV RNA SPEC QL NAA+PROBE: NOT DETECTED
GLUCOSE SERPL-MCNC: 101 MG/DL (ref 65–100)
HADV DNA SPEC QL NAA+PROBE: NOT DETECTED
HCOV 229E RNA SPEC QL NAA+PROBE: NOT DETECTED
HCOV HKU1 RNA SPEC QL NAA+PROBE: NOT DETECTED
HCOV NL63 RNA SPEC QL NAA+PROBE: NOT DETECTED
HCOV OC43 RNA SPEC QL NAA+PROBE: NOT DETECTED
HMPV RNA SPEC QL NAA+PROBE: NOT DETECTED
HPIV1 RNA SPEC QL NAA+PROBE: NOT DETECTED
HPIV2 RNA SPEC QL NAA+PROBE: NOT DETECTED
HPIV3 RNA SPEC QL NAA+PROBE: NOT DETECTED
HPIV4 RNA SPEC QL NAA+PROBE: NOT DETECTED
LACTATE SERPL-SCNC: 1.7 MMOL/L (ref 0.4–2)
M PNEUMO DNA SPEC QL NAA+PROBE: NOT DETECTED
POTASSIUM SERPL-SCNC: 3.9 MMOL/L (ref 3.5–5.1)
RSV RNA SPEC QL NAA+PROBE: NOT DETECTED
RV+EV RNA SPEC QL NAA+PROBE: NOT DETECTED
SARS-COV-2 RNA RESP QL NAA+PROBE: DETECTED
SODIUM SERPL-SCNC: 128 MMOL/L (ref 136–145)

## 2023-02-04 PROCEDURE — 93005 ELECTROCARDIOGRAM TRACING: CPT

## 2023-02-04 PROCEDURE — 36415 COLL VENOUS BLD VENIPUNCTURE: CPT

## 2023-02-04 PROCEDURE — 93306 TTE W/DOPPLER COMPLETE: CPT

## 2023-02-04 PROCEDURE — 74011000250 HC RX REV CODE- 250: Performed by: STUDENT IN AN ORGANIZED HEALTH CARE EDUCATION/TRAINING PROGRAM

## 2023-02-04 PROCEDURE — 74011250637 HC RX REV CODE- 250/637: Performed by: STUDENT IN AN ORGANIZED HEALTH CARE EDUCATION/TRAINING PROGRAM

## 2023-02-04 PROCEDURE — 97161 PT EVAL LOW COMPLEX 20 MIN: CPT

## 2023-02-04 PROCEDURE — 0202U NFCT DS 22 TRGT SARS-COV-2: CPT

## 2023-02-04 PROCEDURE — 65270000046 HC RM TELEMETRY

## 2023-02-04 PROCEDURE — 71045 X-RAY EXAM CHEST 1 VIEW: CPT

## 2023-02-04 PROCEDURE — 86738 MYCOPLASMA ANTIBODY: CPT

## 2023-02-04 PROCEDURE — 74011250636 HC RX REV CODE- 250/636: Performed by: FAMILY MEDICINE

## 2023-02-04 PROCEDURE — 74011250636 HC RX REV CODE- 250/636: Performed by: STUDENT IN AN ORGANIZED HEALTH CARE EDUCATION/TRAINING PROGRAM

## 2023-02-04 PROCEDURE — 97116 GAIT TRAINING THERAPY: CPT

## 2023-02-04 PROCEDURE — 80048 BASIC METABOLIC PNL TOTAL CA: CPT

## 2023-02-04 PROCEDURE — 97535 SELF CARE MNGMENT TRAINING: CPT

## 2023-02-04 PROCEDURE — 83605 ASSAY OF LACTIC ACID: CPT

## 2023-02-04 PROCEDURE — 97165 OT EVAL LOW COMPLEX 30 MIN: CPT

## 2023-02-04 PROCEDURE — 74011000258 HC RX REV CODE- 258: Performed by: FAMILY MEDICINE

## 2023-02-04 PROCEDURE — 74011250637 HC RX REV CODE- 250/637: Performed by: INTERNAL MEDICINE

## 2023-02-04 RX ORDER — SODIUM CHLORIDE 9 MG/ML
75 INJECTION, SOLUTION INTRAVENOUS CONTINUOUS
Status: DISCONTINUED | OUTPATIENT
Start: 2023-02-04 | End: 2023-02-04

## 2023-02-04 RX ORDER — METOPROLOL TARTRATE 25 MG/1
12.5 TABLET, FILM COATED ORAL 2 TIMES DAILY
Status: DISCONTINUED | OUTPATIENT
Start: 2023-02-04 | End: 2023-02-07 | Stop reason: HOSPADM

## 2023-02-04 RX ORDER — LOSARTAN POTASSIUM 50 MG/1
50 TABLET ORAL DAILY
Status: DISCONTINUED | OUTPATIENT
Start: 2023-02-05 | End: 2023-02-07 | Stop reason: HOSPADM

## 2023-02-04 RX ORDER — SODIUM CHLORIDE 9 MG/ML
50 INJECTION, SOLUTION INTRAVENOUS CONTINUOUS
Status: DISCONTINUED | OUTPATIENT
Start: 2023-02-04 | End: 2023-02-04

## 2023-02-04 RX ORDER — DEXAMETHASONE 4 MG/1
6 TABLET ORAL DAILY
Status: DISCONTINUED | OUTPATIENT
Start: 2023-02-04 | End: 2023-02-07 | Stop reason: HOSPADM

## 2023-02-04 RX ORDER — FUROSEMIDE 10 MG/ML
20 INJECTION INTRAMUSCULAR; INTRAVENOUS 2 TIMES DAILY
Status: DISPENSED | OUTPATIENT
Start: 2023-02-04 | End: 2023-02-06

## 2023-02-04 RX ADMIN — CEFEPIME 2 G: 2 INJECTION, POWDER, FOR SOLUTION INTRAVENOUS at 12:25

## 2023-02-04 RX ADMIN — ACETAMINOPHEN 650 MG: 325 TABLET ORAL at 01:28

## 2023-02-04 RX ADMIN — FENOFIBRATE 160 MG: 160 TABLET ORAL at 08:21

## 2023-02-04 RX ADMIN — ACETAMINOPHEN 650 MG: 325 TABLET ORAL at 14:33

## 2023-02-04 RX ADMIN — DEXAMETHASONE 6 MG: 4 TABLET ORAL at 17:57

## 2023-02-04 RX ADMIN — ACETAMINOPHEN 650 MG: 325 TABLET ORAL at 20:30

## 2023-02-04 RX ADMIN — LOSARTAN POTASSIUM 100 MG: 50 TABLET, FILM COATED ORAL at 08:21

## 2023-02-04 RX ADMIN — FUROSEMIDE 20 MG: 10 INJECTION, SOLUTION INTRAMUSCULAR; INTRAVENOUS at 08:21

## 2023-02-04 RX ADMIN — SODIUM CHLORIDE, PRESERVATIVE FREE 10 ML: 5 INJECTION INTRAVENOUS at 16:45

## 2023-02-04 RX ADMIN — ALUMINUM HYDROXIDE AND MAGNESIUM CARBONATE 15 ML: 95; 358 LIQUID ORAL at 22:40

## 2023-02-04 RX ADMIN — METOPROLOL TARTRATE 12.5 MG: 25 TABLET, FILM COATED ORAL at 17:57

## 2023-02-04 RX ADMIN — SODIUM CHLORIDE, PRESERVATIVE FREE 10 ML: 5 INJECTION INTRAVENOUS at 05:46

## 2023-02-04 RX ADMIN — ASPIRIN 81 MG: 81 TABLET, CHEWABLE ORAL at 08:21

## 2023-02-04 RX ADMIN — POLYETHYLENE GLYCOL 3350 17 G: 17 POWDER, FOR SOLUTION ORAL at 22:40

## 2023-02-04 RX ADMIN — ENOXAPARIN SODIUM 40 MG: 100 INJECTION SUBCUTANEOUS at 08:22

## 2023-02-04 RX ADMIN — SODIUM CHLORIDE 50 ML/HR: 9 INJECTION, SOLUTION INTRAVENOUS at 16:44

## 2023-02-04 RX ADMIN — ACETAMINOPHEN 650 MG: 325 TABLET ORAL at 08:21

## 2023-02-04 RX ADMIN — CEFEPIME 2 G: 2 INJECTION, POWDER, FOR SOLUTION INTRAVENOUS at 22:40

## 2023-02-04 RX ADMIN — PANTOPRAZOLE SODIUM 40 MG: 40 TABLET, DELAYED RELEASE ORAL at 22:40

## 2023-02-04 RX ADMIN — SODIUM CHLORIDE, PRESERVATIVE FREE 10 ML: 5 INJECTION INTRAVENOUS at 22:40

## 2023-02-04 RX ADMIN — AZITHROMYCIN MONOHYDRATE 500 MG: 500 INJECTION, POWDER, LYOPHILIZED, FOR SOLUTION INTRAVENOUS at 16:44

## 2023-02-04 NOTE — PROGRESS NOTES
RN got notified by Tele monitor about pt having SVT in frequent intervals, which converts to sinus tach. Pt had a temp of 100.2 F, antipyretic administered. 12 lead EKG done. MD Nichols Listen notified. Awaiting orders from MD. Will continue to monitor. none

## 2023-02-04 NOTE — PROGRESS NOTES
OCCUPATIONAL THERAPY EVALUATION/DISCHARGE  Patient: Judie Garcia (77 y.o. female)  Date: 2/4/2023  Primary Diagnosis: Atypical pneumonia [J18.9]  Failure of outpatient treatment [Z78.9]  Elevated brain natriuretic peptide (BNP) level [R79.89]  Elevated C-reactive protein (CRP) [R79.82]  Bilateral pleural effusion [J90]  Post-acute sequelae of COVID-19 (PASC) [U09.9]  CLL (chronic lymphocytic leukemia) (HCC) [C91.10]       Precautions:   Fall    ASSESSMENT  Based on the objective data described below, the patient presents at her functional baseline of mod I to independent s/p admission for fever and generalized weakness. Pt received semi-supine with supportive family at bedside, and agreeable to participation in therapy session. Pt reports being highly independent PTA, completing all ADL/IADL tasks without difficulty. She demonstrated all bed mobility, functional transfers, and OOB ADLs with mod I today. Pt found on 2L NC, daughter removing when transferring to EOB, and SpO2 remaining stable on RA throughout remainder of session. Slight decrease in DBP noted with activity and sitting up in chair, however pt asymptomatic and MAP stable. As pt is at her functional baseline acute OT will sign off. Recommend d/c home when medically stable with no skilled follow up OT needs indicated at this time. Current Level of Function (ADLs/self-care): mod I     Functional Outcome Measure: The patient scored 100/100 on the Barthel Index outcome measure which is indicative of independence in basic self care. Other factors to consider for discharge: PLOF, supportive family who lives locally     PLAN :    Recommendation for discharge: (in order for the patient to meet his/her long term goals)  No skilled occupational therapy/ follow up rehabilitation needs identified at this time.     This discharge recommendation:  Has been made in collaboration with the attending provider and/or case management    IF patient discharges home will need the following DME: none       SUBJECTIVE:   Patient stated I usually cut 7 acres of grass!     OBJECTIVE DATA SUMMARY:   HISTORY:   Past Medical History:   Diagnosis Date    CLL (chronic lymphocytic leukemia) ([facility] [address]     History of chemotherapy     For chronic lymphacytic leukemia    Menopause 1986     Past Surgical History:   Procedure Laterality Date    HX BREAST BIOPSY Bilateral     surgical-benign    HX HYSTERECTOMY  1986    HX OOPHORECTOMY Bilateral 1986       Prior Level of Function/Environment/Context:   Expanded or extensive additional review of patient history:   Home Situation  Home Environment: Private residence  # Steps to Enter: 7  Rails to Enter: Yes  Hand Rails : Bilateral  One/Two Story Residence: Two story  # of Interior Steps: 15  Interior Rails: Right  Living Alone: Yes  Support Systems: Child(latoya)  Patient Expects to be Discharged to[de-identified] Home with family assistance  Current DME Used/Available at Home: None  Tub or Shower Type: Tub/Shower combination    Hand dominance: Left    EXAMINATION OF PERFORMANCE DEFICITS:  Cognitive/Behavioral Status:  Neurologic State: Alert  Orientation Level: Oriented X4  Cognition: Follows commands  Perception: Appears intact  Perseveration: No perseveration noted  Safety/Judgement: Fall prevention;Home safety    Skin: visually intact     Edema: none noted     Hearing: Auditory  Auditory Impairment: Hard of hearing, bilateral    Vision/Perceptual:                           Acuity: Within Defined Limits;Able to read clock/calendar on wall without difficulty; Able to read employee name badge without difficulty         Range of Motion:    AROM: Within functional limits                         Strength:    Strength:  Within functional limits                Coordination:  Coordination: Within functional limits            Tone & Sensation:    Tone: Normal  Sensation: Intact                      Balance:  Sitting: Intact  Standing: Intact    Functional Mobility and Transfers for ADLs:  Bed Mobility:  Supine to Sit: Modified independent  Scooting: Modified independent    Transfers:  Sit to Stand: Modified independent  Stand to Sit: Modified independent  Bathroom Mobility: Modified independent  Toilet Transfer : Modified independent    ADL Assessment:  Feeding: Independent    Oral Facial Hygiene/Grooming: Modified Independent    Bathing: Modified independent (inferred)         Upper Body Dressing: Modified independent    Lower Body Dressing: Modified independent    Toileting: Modified independent                ADL Intervention and task modifications:  Feeding  Feeding Assistance: Independent  Food to Mouth: Independent  Drink to Mouth: Independent    Grooming  Grooming Assistance: Modified independent  Position Performed: Standing  Washing Face: Modified independent  Washing Hands: Modified independent  Brushing/Combing Hair: Modified independent         Upper Body Dressing Assistance  Front Opened Shirt: Modified independent    Lower Body Dressing Assistance  Socks: Modified independent  Position Performed: Seated edge of bed    Toileting  Toileting Assistance: Modified independent  Bladder Hygiene: Modified independent  Bowel Hygiene: Modified indpendent  Clothing Management: Modified independent    Cognitive Retraining  Safety/Judgement: Fall prevention;Home safety    Functional Measure:    Barthel Index:  Bathin  Bladder: 10  Bowels: 10  Groomin  Dressing: 10  Feeding: 10  Mobility: 15  Stairs: 10  Toilet Use: 10  Transfer (Bed to Chair and Back): 15  Total: 100/100      The Barthel ADL Index: Guidelines  1. The index should be used as a record of what a patient does, not as a record of what a patient could do. 2. The main aim is to establish degree of independence from any help, physical or verbal, however minor and for whatever reason. 3. The need for supervision renders the patient not independent.   4. A patient's performance should be established using the best available evidence. Asking the patient, friends/relatives and nurses are the usual sources, but direct observation and common sense are also important. However direct testing is not needed. 5. Usually the patient's performance over the preceding 24-48 hours is important, but occasionally longer periods will be relevant. 6. Middle categories imply that the patient supplies over 50 per cent of the effort. 7. Use of aids to be independent is allowed. Score Interpretation (from [address] [facility] [address])    Independent   60-79 Minimally independent   40-59 Partially dependent   20-39 Very dependent   <20 Totally dependent     -Catherine Mcelroy., Barthel, D.W. (1965). Functional evaluation: the Barthel Index. [address] ([address] (1997). The Barthel activities of daily living index: self-reporting versus actual performance in the old (> or = 75 years). Journal of [address] [place] [address] 45(7), [address] [facility], LUIS ENRIQUE, Samuel Still., Kurtis Knight. (1999). Measuring the change in disability after inpatient rehabilitation; comparison of the responsiveness of the Barthel Index and Functional [place] Measure. Journal of Neurology, Neurosurgery, and Psychiatry, 66(4), 883-754. Angelica Courtney, N.J.A, JESS Severino, & Diego Vilchis, M.A. (2004) Assessment of post-stroke quality of life in cost-effectiveness studies: The usefulness of the Barthel Index and the EuroQoL-5D. Quality of Life Research, 15, 488-00         Occupational Therapy Evaluation Charge Determination   History Examination Decision-Making   LOW Complexity : Brief history review  LOW Complexity : 1-3 performance deficits relating to physical, cognitive , or psychosocial skils that result in activity limitations and / or participation restrictions  MEDIUM Complexity : Patient may present with comorbidities that affect occupational performnce.  Miniml to moderate modification of tasks or assistance (eg, physical or verbal ) with assesment(s) is necessary to enable patient to complete evaluation       Based on the above components, the patient evaluation is determined to be of the following complexity level: LOW   Pain Rating:  None reported     Activity Tolerance:   Good    After treatment patient left in no apparent distress:    Sitting in chair, Call bell within reach, and Caregiver / family present    COMMUNICATION/EDUCATION:   The patients plan of care was discussed with: Physical therapist and Registered nurse.      Thank you for this referral.  GERMÁN Tellez, OTR/L  Time Calculation: 27 mins

## 2023-02-04 NOTE — ACP (ADVANCE CARE PLANNING)
Advance Care Planning     Advance Care Planning (ACP) Physician/NP/PA Conversation      Date of Conversation: 2/2/2023  Conducted with: Patient with Decision Making Capacity and her [address]:   No healthcare decision makers have been documented. Click here to complete [address] including selection of the Healthcare Decision Maker Relationship (ie \"Primary\")    Care Preferences:    Hospitalization: \"If your health worsens and it becomes clear that your chance of recovery is unlikely, what would be your preference regarding hospitalization? \"  The patient would prefer hospitalization. Ventilation: \"If you were unable to breathe on your own and your chance of recovery was unlikely, what would be your preference about the use of a ventilator (breathing machine) if it was available to you? \"   The patient would NOT desire the use of a ventilator. Resuscitation: \"In the event your heart stopped as a result of an underlying serious health condition, would you want attempts to be made to restart your heart, or would you prefer a natural death? \"   No, do NOT attempt to resuscitate. Additional topics discussed: resuscitation preferences    Conversation Outcomes / Follow-Up Plan:   ACP complete - no further action today  Reviewed DNR/DNI and patient confirms current DNR status - completed forms on file (place new order if needed) Patient has advanced directive.     Diagnoses: CLL, PNA    Length of Voluntary ACP Conversation in minutes:  16 minutes    Makayla Hernandez MD

## 2023-02-04 NOTE — PROGRESS NOTES
unsuccessful obtaining pt labs. Pt daughter, Kai Ibarra, is at bedside and requested that lab draws be postponed until later since the patient is sleeping. Will inform oncoming nurse.

## 2023-02-04 NOTE — PROGRESS NOTES
PHYSICAL THERAPY EVALUATION/DISCHARGE  Patient: Corby Doss (71 y.o. 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 Patient found supine in bed with spo2 97% on 2LPM and agreeable to PT. Patient weaned to RA with spo2 stable at 92% on RA with activity and 96% on RA at rest. Patient overall supervision - independent with all mobility, ambulating 120 feet and completing 14 steps with R rail with no instability or LOB. Patient did demonstrate orthostatic hypotension with upright activity, though remained asymptomatic. Patient provided with incentive spirometer and educated on use due to cough and patient demonstrated good understanding of use. Patient is near functional PLOF and requires no further PT intervention. Recommend return to home with no needs once medically cleared for discharge. Will sign off, please re-consult if change in functional status. 02/04/23 0924 02/04/23 0926 02/04/23 0934   Vital Signs   /63 (!) 113/52  --    MAP (Calculated) 82 72  --    BP Patient Position Semi fowlers; At rest Sitting Walking   O2 Sat (%) 95 % 93 % 92 %   O2 Device Nasal cannula None (Room air) None (Room air)   O2 Flow Rate (L/min) 2 l/min  --   --       02/04/23 0935 02/04/23 0938   Vital Signs   BP 99/67 (!) 112/45   MAP (Calculated) 78 67   BP Patient Position Sitting  (post-activity) Sitting  (post-activity)   O2 Sat (%) 92 % 92 %   O2 Device None (Room air) None (Room air)   O2 Flow Rate (L/min)  --   --        Functional Outcome Measure: The patient scored 28/28 on the tinetti outcome measure which is indicative of low risk for falls. Other factors to consider for discharge: lives alone, independent at baseline, spo2 stable on RA, asymptomatic orthostatic hypotension     Further skilled acute physical therapy is not indicated at this time. PLAN :  Recommendation for discharge: (in order for the patient to meet his/her long term goals)  No skilled physical therapy/ follow up rehabilitation needs identified at this time. This discharge recommendation:  A follow-up discussion with the attending provider and/or case management is planned    IF patient discharges home will need the following DME: none       SUBJECTIVE:   Patient stated I cut 7 acres of grass every 10 days on my riding mower.     OBJECTIVE DATA SUMMARY:   HISTORY:    Past Medical History:   Diagnosis Date    CLL (chronic lymphocytic leukemia) ([facility] [address]     History of chemotherapy     For chronic lymphacytic leukemia    Menopause 1986     Past Surgical History:   Procedure Laterality Date    HX BREAST BIOPSY Bilateral     surgical-benign    HX HYSTERECTOMY  1986    HX OOPHORECTOMY Bilateral 1986       Prior level of function: independent  Personal factors and/or comorbidities impacting plan of care: hx falls, hx CLL    Home Situation  Home Environment: Private residence  # Steps to Enter: 7  Rails to Enter: Yes  Hand Rails : Bilateral  One/Two Story Residence: Two story  # of Interior Steps: 15  Interior Rails: Right  Living Alone: Yes  Support Systems: Child(latoya)  Patient Expects to be Discharged to[de-identified] Home with family assistance  Current DME Used/Available at Home: None  Tub or Shower Type: Tub/Shower combination    EXAMINATION/PRESENTATION/DECISION MAKING:   Critical Behavior:  Neurologic State: Alert  Orientation Level: Oriented X4  Cognition: Follows commands  Safety/Judgement: Fall prevention, Home safety  Hearing: Auditory  Auditory Impairment: Hard of hearing, bilateral  Skin:  intact  Edema: none noted  Range Of Motion:  AROM: Within functional limits                       Strength:    Strength: Within functional limits                    Tone & Sensation:   Tone: Normal              Sensation: Intact               Coordination:  Coordination: Within functional limits  Vision:   Acuity: Within Defined Limits;Able to read clock/calendar on wall without difficulty; Able to read employee name badge without difficulty  Functional Mobility:  Bed Mobility:     Supine to Sit: Modified independent     Scooting: Modified independent  Transfers:  Sit to Stand: Modified independent  Stand to Sit: Modified independent        Bed to Chair: Supervision              Balance:   Sitting: Intact  Standing: Intact  Ambulation/Gait Training:  Distance (ft): 120 Feet (ft)  Assistive Device: Gait belt  Ambulation - Level of Assistance: Supervision     Gait Description (WDL): Exceptions to WDL                                          Stairs:  Number of Stairs Trained: 14  Stairs - Level of Assistance: Stand-by assistance   Rail Use: Right         Functional Measure:  Tinetti test:    Sitting Balance: 1  Arises: 2  Attempts to Rise: 2  Immediate Standing Balance: 2  Standing Balance: 2  Nudged: 2  Eyes Closed: 1  Turn 360 Degrees - Continuous/Discontinuous: 1  Turn 360 Degrees - Steady/Unsteady: 1  Sitting Down: 2  Balance Score: 16 Balance total score  Indication of Gait: 1  R Step Length/Height: 1  L Step Length/Height: 1  R Foot Clearance: 1  L Foot Clearance: 1  Step Symmetry: 1  Step Continuity: 1  Path: 2  Trunk: 2  Walking Time: 1  Gait Score: 12 Gait total score  Total Score: 28/28 Overall total score         Tinetti Tool Score Risk of Falls  <19 = High Fall Risk  19-24 = Moderate Fall Risk  25-28 = Low Fall Risk  Tinetti ME. Performance-Oriented Assessment of Mobility Problems in Elderly Patients. [address] 66; [id number]. (Scoring Description: PT Bulletin Feb. 10, 1993)    Older adults: Branden Burden et al, 2009; n = 1000 [facility] elderly evaluated with ABC, OZZIE, ADL, and IADL)  · Mean OZZIE score for males aged 69-68 years = 26.21(3.40)  · Mean OZZIE score for females age 69-68 years = 25.16(4.30)  · Mean OZZIE score for males over 80 years = 23.29(6.02)  · Mean OZZIE score for females over 80 years = 17.20(8.32)            Physical Therapy Evaluation Charge Determination   History Examination Presentation Decision-Making   MEDIUM  Complexity : 1-2 comorbidities / personal factors will impact the outcome/ POC  LOW Complexity : 1-2 Standardized tests and measures addressing body structure, function, activity limitation and / or participation in recreation  LOW Complexity : Stable, uncomplicated  Other outcome measures tinetti  LOW       Based on the above components, the patient evaluation is determined to be of the following complexity level: LOW     Pain Ratin/10    Activity Tolerance:   Good, SpO2 stable on RA, and signs and symptoms of orthostatic hypotension      After treatment patient left in no apparent distress:   Sitting in chair, Call bell within reach, and Caregiver / family present    COMMUNICATION/EDUCATION:   The patients plan of care was discussed with: Occupational therapist and Registered nurse. Fall prevention education was provided and the patient/caregiver indicated understanding.     Thank you for this referral.  Mendy Rahman, PT   Time Calculation: 27 mins

## 2023-02-04 NOTE — PROGRESS NOTES
[facility]  Hospitalist Group                                                                                          Hospitalist Progress Note  Hien Hill MD  Answering service: [phone] from in house phone        Date of Service:  2023  NAME:  Cherelle Carr  :  1933  MRN:  [id number]       Admission Summary:   Cherelle Carr is a 80 y.o. female who presents with fevers. 66-year-old  female with past medical history of CLL who follows with oncology and COVID diagnosed 1 month ago who was transferred here for the above. Patient reports that she has been having on and off fevers for about a week. She reports that these fevers are at night or in the morning and if ranged to a temperature max of 102 °F.  She reports she had COVID 1 month ago and was told by her primary doctor that if she had fevers and shortness of breath to go to an ER. She reports that she has been having increased cough along with her fevers, for about 3 weeks now. She denies any hemoptysis. She does report some productive cough since having had COVID. She and her family report that she has been started on doxycycline by her primary care provider for concern for pneumonia and possible UTI. She did have a CT yesterday which showed patchy airspace disease and bilateral pleural effusions. She reports increased shortness of breath with exertion but not at rest.  She denies any hypoxia, syncope, chest pain, palpitations. At the time of my exam, patient is laying comfortably in bed with family at bedside. No acute complaints at this time. Patient was found to have elevated BNP at 1062 and, as mentioned before, CT showing scarring versus inflammatory versus infectious and pleural effusion. Interval history / Subjective:   Patient seen and examined earlier this morning by me for follow-up of atypical pneumonia and pleural effusions.   Patient is on 2 L of oxygen at the time my exam though it is unclear if she needs it. Per her family she had a bit of a temperature overnight. No complaints this morning.      Assessment & Plan:     Likely atypical CAP with failed outpatient treatment  Previous COVID infection likely influencing  Patient was given cefepime we will continue on cefepime for now  Continuous pulse ox  Monitor with labs  CT shows bilateral groundglass opacities as well  Consider sequelae of COVID  Respiratory virus panel ordered  Sputum culture pending  Continue current management     Bilateral pleural effusion, some concern for heart failure  Elevated BNP  Echocardiogram ordered and pending  Increasing Lasix to twice daily as chest x-ray shows increased pulmonary edema     CLL  Follows with [facility]  Recently saw her oncologist  Monitor with labs  CT shows worsening disease but her comparison is 2014  Continue patient's prednisone as ordered by her oncologist  Consider oncology consult for potentially worsening disease though I do not have a comparison that is within an acceptable timeframe     Previous COVID-19 infection  Patient reports that she has remained positive since her infection      Constipation  MiraLAX     Code status:   Prophylaxis:   Care Plan discussed with:   Anticipated Disposition:   Inpatient  Cardiac monitoring: Telemetry     Hospital Problems  Never Reviewed            Codes Class Noted POA    Elevated C-reactive protein (CRP) ICD-10-CM: R79.82  ICD-9-CM: 790.95  2/2/2023 Unknown        [facility] ICD-10-CM: C91.10  ICD-9-CM: 204.10  2/2/2023 Unknown        Atypical pneumonia ICD-10-CM: J18.9  ICD-9-CM: 526  2/2/2023 Unknown        Bilateral pleural effusion ICD-10-CM: J90  ICD-9-CM: 511.9  2/2/2023 Unknown        Elevated brain natriuretic peptide (BNP) level ICD-10-CM: R79.89  ICD-9-CM: 790.99  2/2/2023 Unknown        Failure of outpatient treatment ICD-10-CM: Z78.9  ICD-9-CM: V49.89  2/2/2023 Unknown Post-acute sequelae of COVID-19 (PASC) ICD-10-CM: U09.9  ICD-9-CM: 139.8  2/2/2023 Unknown           Social Determinants of Health     Tobacco Use: Low Risk     Smoking Tobacco Use: Never    Smokeless Tobacco Use: Never    Passive Exposure: Not on file   Alcohol Use: Not on file   Financial Resource Strain: Not on file   Food Insecurity: Not on file   Transportation Needs: Not on file   Physical Activity: Not on file   Stress: Not on file   Social Connections: Not on file   Intimate Partner Violence: Not on file   Depression: Not on file   Housing Stability: Not on file       Review of Systems:   A comprehensive review of systems was negative except for that written in the HPI. Vital Signs:    Last 24hrs VS reviewed since prior progress note. Most recent are:  Visit Vitals  BP (!) 105/36 (BP 1 Location: Right upper arm, BP Patient Position: Sitting)   Pulse (!) 107   Temp 97.6 °F (36.4 °C)   Resp 16   Ht 4' 11\" (1.499 m)   Wt 53.8 kg (118 lb 9.7 oz)   SpO2 98%   BMI 23.96 kg/m²       No intake or output data in the 24 hours ending 02/04/23 1418     Physical Examination:     I had a face to face encounter with this patient and independently examined them on 2/4/2023 as outlined below:          Constitutional:  No acute distress, cooperative, pleasant    ENT:  Oral mucosa moist, oropharynx benign. Resp: Bilateral crackles. No rhonchi or wheezing. No respiratory distress. No tachypnea. CV:  Regular rhythm, normal rate, no murmurs, gallops, rubs    GI:  Soft, non distended, non tender. normoactive bowel sounds, no hepatosplenomegaly     Musculoskeletal:  No edema, warm, 2+ pulses throughout    Neurologic:  Moves all extremities.   AAOx3, CN II-XII reviewed            Data Review:    Review and/or order of clinical lab test  Review and/or order of tests in the radiology section of CPT  Review and/or order of tests in the medicine section of CPT    I have independently reviewed and interpreted patient's lab and all other diagnostic data    Labs:     Recent Labs     02/03/23  1029 02/02/23 1938   WBC 4.7 5.3   HGB 9.8* 11.4*   HCT 29.9* 34.0*    265     Recent Labs     02/04/23  0841 02/03/23 1029 02/02/23 1938   * 132* 130*   K 3.9 3.3* 3.7   CL 95* 97 93*   CO2 26 28 28   BUN 10 12 18   CREA 0.58 0.72 0.78   * 166* 134*   CA 9.5 9.3 9.9     Recent Labs     02/02/23 1938   ALT 25   AP 64   TBILI 0.8   TP 6.4   ALB 3.9   GLOB 2.5     No results for input(s): INR, PTP, APTT, INREXT in the last 72 hours. No results for input(s): FE, TIBC, PSAT, FERR in the last 72 hours. No results found for: FOL, RBCF   No results for input(s): PH, PCO2, PO2 in the last 72 hours. No results for input(s): CPK, CKNDX, TROIQ in the last 72 hours. No lab exists for component: CPKMB  No results found for: CHOL, CHOLX, CHLST, CHOLV, HDL, HDLP, LDL, LDLC, DLDLP, TGLX, TRIGL, TRIGP, CHHD, CHHDX  No results found for: [facility]  Lab Results   Component Value Date/Time    Color YELLOW/STRAW 02/02/2023 07:56 PM    Appearance CLEAR 02/02/2023 07:56 PM    Specific gravity 1.020 02/02/2023 07:56 PM    pH (UA) 5.5 02/02/2023 07:56 PM    Protein Negative 02/02/2023 07:56 PM    Glucose Negative 02/02/2023 07:56 PM    Ketone Negative 02/02/2023 07:56 PM    Bilirubin Negative 02/02/2023 07:56 PM    Urobilinogen 1.0 02/02/2023 07:56 PM    Nitrites Negative 02/02/2023 07:56 PM    Leukocyte Esterase SMALL (A) 02/02/2023 07:56 PM    Epithelial cells MODERATE (A) 02/02/2023 07:56 PM    Bacteria Negative 02/02/2023 07:56 PM    WBC 10-20 02/02/2023 07:56 PM    RBC 0-5 02/02/2023 07:56 PM       Notes reviewed from all clinical/nonclinical/nursing services involved in patient's clinical care. Care coordination discussions were held with appropriate clinical/nonclinical/ nursing providers based on care coordination needs.          Patients current active Medications were reviewed, considered, added and adjusted based on the clinical condition today. Home Medications were reconciled to the best of my ability given all available resources at the time of admission. Route is PO if not otherwise noted.       Medications Reviewed:     Current Facility-Administered Medications   Medication Dose Route Frequency    furosemide (LASIX) injection 20 mg  20 mg IntraVENous BID    cefepime (MAXIPIME) 2 g in 0.9% sodium chloride (MBP/ADV) 100 mL MBP  2 g IntraVENous Q12H    losartan (COZAAR) tablet 100 mg  100 mg Oral DAILY    fenofibrate (LOFIBRA) tablet 160 mg  160 mg Oral DAILY    pantoprazole (PROTONIX) tablet 40 mg  40 mg Oral QHS    aspirin chewable tablet 81 mg  81 mg Oral DAILY    aluminum hydrox-magnesium carb (GAVISCON) oral suspension 15 mL  15 mL Oral QHS    predniSONE (DELTASONE) tablet 1 mg  1 mg Oral EVERY OTHER DAY    sodium chloride (NS) flush 5-40 mL  5-40 mL IntraVENous Q8H    sodium chloride (NS) flush 5-40 mL  5-40 mL IntraVENous PRN    acetaminophen (TYLENOL) tablet 650 mg  650 mg Oral Q6H PRN    Or    acetaminophen (TYLENOL) suppository 650 mg  650 mg Rectal Q6H PRN    ondansetron (ZOFRAN ODT) tablet 4 mg  4 mg Oral Q8H PRN    Or    ondansetron (ZOFRAN) injection 4 mg  4 mg IntraVENous Q6H PRN    enoxaparin (LOVENOX) injection 40 mg  40 mg SubCUTAneous DAILY    polyethylene glycol (MIRALAX) packet 17 g  17 g Oral QHS     ______________________________________________________________________  EXPECTED LENGTH OF STAY: - - -  ACTUAL LENGTH OF STAY:          2                 Mathew Garcia MD

## 2023-02-04 NOTE — PROGRESS NOTES
Day #1 of azithromycin  Indication:  CAP  Current regimen:  500 mg IV once daily  Abx regimen: cefepime 2 g IV every 12 h + azithromycin  Recent Labs     23  0841 23  1029 23  1938   WBC  --  4.7 5.3   CREA 0.58 0.72 0.78   BUN 10 12 18     Est CrCl: 41.2 ml/min; UO: -- ml/kg/hr  Temp (24hrs), Av.8 °F (37.1 °C), Min:97.6 °F (36.4 °C), Max:100.6 °F (38.1 °C)    Cultures:   Blood cx 2/2: NGTD  Urine cx 2/2: on hold  MRSA ordered     Plan: Continue current regimen

## 2023-02-04 NOTE — PROGRESS NOTES
Problem: Falls - Risk of  Goal: *Absence of Falls  Description: Document Paddy Canas Fall Risk and appropriate interventions in the flowsheet. Outcome: Progressing Towards Goal  Note: Fall Risk Interventions:  Mobility Interventions: Bed/chair exit alarm, OT consult for ADLs    History of Falls Interventions: Bed/chair exit alarm, Consult care management for discharge planning    Problem: Patient Education: Go to Patient Education Activity  Goal: Patient/Family Education  Outcome: Progressing Towards Goal     Problem: Cardiac Output -  Decreased  Goal: *Vital signs within specified parameters  Outcome: Progressing Towards Goal     Problem: Patient Education: Go to Patient Education Activity  Goal: Patient/Family Education  Outcome: Progressing Towards Goal     Problem: Pain  Goal: *Control of Pain  Outcome: Progressing Towards Goal  Goal: *PALLIATIVE CARE:  Alleviation of Pain  Outcome: Progressing Towards Goal     Problem: Patient Education: Go to Patient Education Activity  Goal: Patient/Family Education  Outcome: Progressing Towards Goal     Problem: Risk for Spread of Infection  Goal: Prevent transmission of infectious organism to others  Description: Prevent the transmission of infectious organisms to other patients, staff members, and visitors.   Outcome: Progressing Towards Goal     Problem: Patient Education:  Go to Education Activity  Goal: Patient/Family Education  Outcome: Progressing Towards Goal

## 2023-02-05 LAB
ANION GAP SERPL CALC-SCNC: 6 MMOL/L (ref 5–15)
BACTERIA SPEC CULT: NORMAL
BACTERIA SPEC CULT: NORMAL
BASOPHILS # BLD: 0 K/UL (ref 0–0.1)
BASOPHILS NFR BLD: 0 % (ref 0–1)
BUN SERPL-MCNC: 12 MG/DL (ref 6–20)
BUN/CREAT SERPL: 24 (ref 12–20)
CALCIUM SERPL-MCNC: 9.6 MG/DL (ref 8.5–10.1)
CHLORIDE SERPL-SCNC: 100 MMOL/L (ref 97–108)
CO2 SERPL-SCNC: 28 MMOL/L (ref 21–32)
CREAT SERPL-MCNC: 0.51 MG/DL (ref 0.55–1.02)
DIFFERENTIAL METHOD BLD: ABNORMAL
ECHO AR MAX VEL PISA: 5 M/S
ECHO AV AREA PEAK VELOCITY: 1.6 CM2
ECHO AV AREA VTI: 1.7 CM2
ECHO AV AREA/BSA VTI: 1.1 CM2/M2
ECHO AV MEAN GRADIENT: 14 MMHG
ECHO AV MEAN VELOCITY: 1.8 M/S
ECHO AV PEAK GRADIENT: 22 MMHG
ECHO AV PEAK GRADIENT: 22 MMHG
ECHO AV PEAK VELOCITY: 2.3 M/S
ECHO AV PEAK VELOCITY: 2.3 M/S
ECHO AV REGURGITANT PHT: 228.6 MILLISECOND
ECHO AV VTI: 42.6 CM
ECHO EST RA PRESSURE: 15 MMHG
ECHO LA DIAMETER INDEX: 2.7 CM/M2
ECHO LA DIAMETER: 4 CM
ECHO LA VOL 4C: 91 ML (ref 22–52)
ECHO LA VOLUME INDEX A4C: 61 ML/M2 (ref 16–34)
ECHO LV FRACTIONAL SHORTENING: 31 % (ref 28–44)
ECHO LV INTERNAL DIMENSION DIASTOLE INDEX: 2.64 CM/M2
ECHO LV INTERNAL DIMENSION DIASTOLIC: 3.9 CM (ref 3.9–5.3)
ECHO LV INTERNAL DIMENSION SYSTOLIC INDEX: 1.82 CM/M2
ECHO LV INTERNAL DIMENSION SYSTOLIC: 2.7 CM
ECHO LV IVSD: 0.8 CM (ref 0.6–0.9)
ECHO LV MASS 2D: 97.4 G (ref 67–162)
ECHO LV MASS INDEX 2D: 65.8 G/M2 (ref 43–95)
ECHO LV POSTERIOR WALL DIASTOLIC: 0.9 CM (ref 0.6–0.9)
ECHO LV RELATIVE WALL THICKNESS RATIO: 0.46
ECHO LVOT AREA: 3.1 CM2
ECHO LVOT AV VTI INDEX: 0.54
ECHO LVOT DIAM: 2 CM
ECHO LVOT MEAN GRADIENT: 3 MMHG
ECHO LVOT PEAK GRADIENT: 6 MMHG
ECHO LVOT PEAK GRADIENT: 6 MMHG
ECHO LVOT PEAK VELOCITY: 1.2 M/S
ECHO LVOT PEAK VELOCITY: 1.2 M/S
ECHO LVOT STROKE VOLUME INDEX: 48.6 ML/M2
ECHO LVOT SV: 71.9 ML
ECHO LVOT VTI: 22.9 CM
ECHO RIGHT VENTRICULAR SYSTOLIC PRESSURE (RVSP): 63 MMHG
ECHO RV TAPSE: 2.9 CM (ref 1.7–?)
ECHO TV REGURGITANT MAX VELOCITY: 3.46 M/S
ECHO TV REGURGITANT PEAK GRADIENT: 48 MMHG
EOSINOPHIL # BLD: 0 K/UL (ref 0–0.4)
EOSINOPHIL NFR BLD: 0 % (ref 0–7)
ERYTHROCYTE [DISTWIDTH] IN BLOOD BY AUTOMATED COUNT: 14.9 % (ref 11.5–14.5)
GLUCOSE SERPL-MCNC: 151 MG/DL (ref 65–100)
HCT VFR BLD AUTO: 33.2 % (ref 35–47)
HGB BLD-MCNC: 10.6 G/DL (ref 11.5–16)
IMM GRANULOCYTES # BLD AUTO: 0 K/UL (ref 0–0.04)
IMM GRANULOCYTES NFR BLD AUTO: 0 % (ref 0–0.5)
LYMPHOCYTES # BLD: 1.6 K/UL (ref 0.8–3.5)
LYMPHOCYTES NFR BLD: 38 % (ref 12–49)
MCH RBC QN AUTO: 27 PG (ref 26–34)
MCHC RBC AUTO-ENTMCNC: 31.9 G/DL (ref 30–36.5)
MCV RBC AUTO: 84.5 FL (ref 80–99)
MONOCYTES # BLD: 0.2 K/UL (ref 0–1)
MONOCYTES NFR BLD: 5 % (ref 5–13)
NEUTS SEG # BLD: 2.4 K/UL (ref 1.8–8)
NEUTS SEG NFR BLD: 57 % (ref 32–75)
NRBC # BLD: 0 K/UL (ref 0–0.01)
NRBC BLD-RTO: 0 PER 100 WBC
OSMOLALITY SERPL: 278 MOSM/KG H2O
OSMOLALITY UR: 195 MOSM/KG H2O
PLATELET # BLD AUTO: 326 K/UL (ref 150–400)
PMV BLD AUTO: 9 FL (ref 8.9–12.9)
POTASSIUM SERPL-SCNC: 3.8 MMOL/L (ref 3.5–5.1)
RBC # BLD AUTO: 3.93 M/UL (ref 3.8–5.2)
SERVICE CMNT-IMP: NORMAL
SODIUM SERPL-SCNC: 134 MMOL/L (ref 136–145)
SODIUM UR-SCNC: 9 MMOL/L
UR CULT HOLD, URHOLD: NORMAL
WBC # BLD AUTO: 4.2 K/UL (ref 3.6–11)

## 2023-02-05 PROCEDURE — 74011250637 HC RX REV CODE- 250/637: Performed by: STUDENT IN AN ORGANIZED HEALTH CARE EDUCATION/TRAINING PROGRAM

## 2023-02-05 PROCEDURE — 83935 ASSAY OF URINE OSMOLALITY: CPT

## 2023-02-05 PROCEDURE — 36415 COLL VENOUS BLD VENIPUNCTURE: CPT

## 2023-02-05 PROCEDURE — 80048 BASIC METABOLIC PNL TOTAL CA: CPT

## 2023-02-05 PROCEDURE — 85025 COMPLETE CBC W/AUTO DIFF WBC: CPT

## 2023-02-05 PROCEDURE — 94760 N-INVAS EAR/PLS OXIMETRY 1: CPT

## 2023-02-05 PROCEDURE — 74011250636 HC RX REV CODE- 250/636: Performed by: FAMILY MEDICINE

## 2023-02-05 PROCEDURE — 87040 BLOOD CULTURE FOR BACTERIA: CPT

## 2023-02-05 PROCEDURE — 74011250637 HC RX REV CODE- 250/637: Performed by: INTERNAL MEDICINE

## 2023-02-05 PROCEDURE — 74011000258 HC RX REV CODE- 258: Performed by: FAMILY MEDICINE

## 2023-02-05 PROCEDURE — 84300 ASSAY OF URINE SODIUM: CPT

## 2023-02-05 PROCEDURE — 74011250636 HC RX REV CODE- 250/636: Performed by: STUDENT IN AN ORGANIZED HEALTH CARE EDUCATION/TRAINING PROGRAM

## 2023-02-05 PROCEDURE — 65270000046 HC RM TELEMETRY

## 2023-02-05 PROCEDURE — 83930 ASSAY OF BLOOD OSMOLALITY: CPT

## 2023-02-05 PROCEDURE — 74011000250 HC RX REV CODE- 250: Performed by: STUDENT IN AN ORGANIZED HEALTH CARE EDUCATION/TRAINING PROGRAM

## 2023-02-05 RX ORDER — FUROSEMIDE 20 MG/1
20 TABLET ORAL DAILY
Status: DISCONTINUED | OUTPATIENT
Start: 2023-02-06 | End: 2023-02-07 | Stop reason: HOSPADM

## 2023-02-05 RX ORDER — AZITHROMYCIN 250 MG/1
250 TABLET, FILM COATED ORAL DAILY
Status: DISCONTINUED | OUTPATIENT
Start: 2023-02-06 | End: 2023-02-07 | Stop reason: HOSPADM

## 2023-02-05 RX ADMIN — SODIUM CHLORIDE, PRESERVATIVE FREE 10 ML: 5 INJECTION INTRAVENOUS at 15:16

## 2023-02-05 RX ADMIN — FUROSEMIDE 20 MG: 10 INJECTION, SOLUTION INTRAMUSCULAR; INTRAVENOUS at 18:34

## 2023-02-05 RX ADMIN — AZITHROMYCIN MONOHYDRATE 500 MG: 500 INJECTION, POWDER, LYOPHILIZED, FOR SOLUTION INTRAVENOUS at 15:15

## 2023-02-05 RX ADMIN — CEFEPIME 2 G: 2 INJECTION, POWDER, FOR SOLUTION INTRAVENOUS at 12:05

## 2023-02-05 RX ADMIN — PANTOPRAZOLE SODIUM 40 MG: 40 TABLET, DELAYED RELEASE ORAL at 22:18

## 2023-02-05 RX ADMIN — DEXAMETHASONE 6 MG: 4 TABLET ORAL at 08:18

## 2023-02-05 RX ADMIN — ASPIRIN 81 MG: 81 TABLET, CHEWABLE ORAL at 08:17

## 2023-02-05 RX ADMIN — SODIUM CHLORIDE, PRESERVATIVE FREE 10 ML: 5 INJECTION INTRAVENOUS at 22:19

## 2023-02-05 RX ADMIN — ENOXAPARIN SODIUM 40 MG: 100 INJECTION SUBCUTANEOUS at 08:18

## 2023-02-05 RX ADMIN — FENOFIBRATE 160 MG: 160 TABLET ORAL at 08:18

## 2023-02-05 RX ADMIN — ACETAMINOPHEN 650 MG: 325 TABLET ORAL at 20:11

## 2023-02-05 RX ADMIN — CEFEPIME 2 G: 2 INJECTION, POWDER, FOR SOLUTION INTRAVENOUS at 22:19

## 2023-02-05 RX ADMIN — ALUMINUM HYDROXIDE AND MAGNESIUM CARBONATE 15 ML: 95; 358 LIQUID ORAL at 23:14

## 2023-02-05 RX ADMIN — SODIUM CHLORIDE, PRESERVATIVE FREE 10 ML: 5 INJECTION INTRAVENOUS at 05:19

## 2023-02-05 RX ADMIN — POLYETHYLENE GLYCOL 3350 17 G: 17 POWDER, FOR SOLUTION ORAL at 22:18

## 2023-02-05 RX ADMIN — FUROSEMIDE 20 MG: 10 INJECTION, SOLUTION INTRAMUSCULAR; INTRAVENOUS at 08:16

## 2023-02-05 RX ADMIN — LOSARTAN POTASSIUM 50 MG: 50 TABLET, FILM COATED ORAL at 08:18

## 2023-02-05 RX ADMIN — METOPROLOL TARTRATE 12.5 MG: 25 TABLET, FILM COATED ORAL at 08:17

## 2023-02-05 RX ADMIN — METOPROLOL TARTRATE 12.5 MG: 25 TABLET, FILM COATED ORAL at 18:34

## 2023-02-05 NOTE — PROGRESS NOTES
[facility] Adult  Hospitalist Group                                                                                          Hospitalist Progress Note  Jadyn Munoz MD  Answering service: [phone] from in house phone        Date of Service:  2023  NAME:  Darline Oliveira  :  1933  MRN:  [id number]       Admission Summary:   Darline Oliveira is a 80 y.o. female who presents with fevers. 72-year-old  female with past medical history of CLL who follows with oncology and COVID diagnosed 1 month ago who was transferred here for the above. Patient reports that she has been having on and off fevers for about a week. She reports that these fevers are at night or in the morning and if ranged to a temperature max of 102 °F.  She reports she had COVID 1 month ago and was told by her primary doctor that if she had fevers and shortness of breath to go to an ER. She reports that she has been having increased cough along with her fevers, for about 3 weeks now. She denies any hemoptysis. She does report some productive cough since having had COVID. She and her family report that she has been started on doxycycline by her primary care provider for concern for pneumonia and possible UTI. She did have a CT yesterday which showed patchy airspace disease and bilateral pleural effusions. She reports increased shortness of breath with exertion but not at rest.  She denies any hypoxia, syncope, chest pain, palpitations. At the time of my exam, patient is laying comfortably in bed with family at bedside. No acute complaints at this time. Patient was found to have elevated BNP at 1062 and, as mentioned before, CT showing scarring versus inflammatory versus infectious and pleural effusion. Interval history / Subjective:   Patient seen and examined earlier this morning by me for follow-up of atypical pneumonia and pleural effusions. Patient on room air.   Reports feeling a lot better. No acute complaints. Assessment & Plan:     Likely atypical CAP with failed outpatient treatment  Previous COVID infection likely influencing  Patient was given cefepime we will continue on cefepime for now  Continuous pulse ox  Monitor with labs  CT shows bilateral groundglass opacities as well  Consider sequelae of COVID  Respiratory virus panel ordered  Sputum culture pending  Continue current management     Bilateral pleural effusion, some concern for heart failure  Mild diastolic CHF grade 1  Switching to p.o. Lasix tomorrow  Likely discharge with some p.o.  Lasix  Metoprolol started per cardiology    Atrial tachycardia  Metoprolol started by cardiology     CLL  Oncology has seen  Continue current management, disease stable     Previous COVID-19 infection  Patient reports that she has remained positive since her infection   Dexamethasone 10 mg p.o. daily     Constipation  MiraLAX     Code status: DNR  Prophylaxis: Lovenox  Care Plan discussed with: Patient, nurse, family, and subspecialist  Anticipated Disposition: 24 hours home  Inpatient  Cardiac monitoring: Telemetry     Hospital Problems  Never Reviewed            Codes Class Noted POA    Elevated C-reactive protein (CRP) ICD-10-CM: R79.82  ICD-9-CM: 790.95  2/2/2023 Unknown        CLL (chronic lymphocytic [facility]) ([facility] ICD-10-CM: C91.10  ICD-9-CM: 204.10  2/2/2023 Unknown        Atypical pneumonia ICD-10-CM: J18.9  ICD-9-CM: 858  2/2/2023 Unknown        Bilateral pleural effusion ICD-10-CM: J90  ICD-9-CM: 511.9  2/2/2023 Unknown        Elevated brain natriuretic peptide (BNP) level ICD-10-CM: R79.89  ICD-9-CM: 790.99  2/2/2023 Unknown        Failure of outpatient treatment ICD-10-CM: Z78.9  ICD-9-CM: V49.89  2/2/2023 Unknown        Post-acute sequelae of COVID-19 (PASC) ICD-10-CM: U09.9  ICD-9-CM: 139.8  2/2/2023 Unknown         Social Determinants of Health     Tobacco Use: Low Risk     Smoking Tobacco Use: Never    Smokeless Tobacco Use: Never    Passive Exposure: Not on file   Alcohol Use: Not on file   Financial Resource Strain: Not on file   Food Insecurity: Not on file   Transportation Needs: Not on file   Physical Activity: Not on file   Stress: Not on file   Social Connections: Not on file   Intimate Partner Violence: Not on file   Depression: Not on file   Housing Stability: Not on file       Review of Systems:   A comprehensive review of systems was negative except for that written in the HPI. Vital Signs:    Last 24hrs VS reviewed since prior progress note. Most recent are:  Visit Vitals  BP (!) 124/54 (BP 1 Location: Right upper arm, BP Patient Position: Lying)   Pulse 85   Temp 98.3 °F (36.8 °C)   Resp 18   Ht 4' 11\" (1.499 m)   Wt 53.8 kg (118 lb 9.7 oz)   SpO2 96%   BMI 23.96 kg/m²         Intake/Output Summary (Last 24 hours) at 2/5/2023 1654  Last data filed at 2/5/2023 1520  Gross per 24 hour   Intake 740 ml   Output 2675 ml   Net -1935 ml          Physical Examination:     I had a face to face encounter with this patient and independently examined them on 2/5/2023 as outlined below:          Constitutional:  No acute distress, cooperative, pleasant    ENT:  Oral mucosa moist, oropharynx benign. Resp: Bilateral crackles, improved  No rhonchi or wheezing. No respiratory distress. No tachypnea. CV:  Regular rhythm, normal rate, no murmurs, gallops, rubs    GI:  Soft, non distended, non tender. normoactive bowel sounds, no hepatosplenomegaly     Musculoskeletal:  No edema, warm, 2+ pulses throughout    Neurologic:  Moves all extremities.   AAOx3, CN II-XII reviewed            Data Review:    Review and/or order of clinical lab test  Review and/or order of tests in the radiology section of CPT  Review and/or order of tests in the medicine section of CPT    I have independently reviewed and interpreted patient's lab and all other diagnostic data    Labs:     Recent Labs     02/05/23  0520 02/03/23  1029   WBC 4.2 4. 7   HGB 10.6* 9.8*   HCT 33.2* 29.9*    252       Recent Labs     02/05/23  0520 02/04/23  0841 02/03/23  1029   * 128* 132*   K 3.8 3.9 3.3*    95* 97   CO2 28 26 28   BUN 12 10 12   CREA 0.51* 0.58 0.72   * 101* 166*   CA 9.6 9.5 9.3       Recent Labs     02/02/23  1938   ALT 25   AP 64   TBILI 0.8   TP 6.4   ALB 3.9   GLOB 2.5       No results for input(s): INR, PTP, APTT, INREXT, INREXT in the last 72 hours. No results for input(s): FE, TIBC, PSAT, FERR in the last 72 hours. No results found for: FOL, RBCF   No results for input(s): PH, PCO2, PO2 in the last 72 hours. No results for input(s): CPK, CKNDX, TROIQ in the last 72 hours. No lab exists for component: CPKMB  No results found for: CHOL, CHOLX, CHLST, CHOLV, HDL, HDLP, LDL, LDLC, DLDLP, TGLX, TRIGL, TRIGP, CHHD, CHHDX  No results found for: [facility]  Lab Results   Component Value Date/Time    Color YELLOW/STRAW 02/02/2023 07:56 PM    Appearance CLEAR 02/02/2023 07:56 PM    Specific gravity 1.020 02/02/2023 07:56 PM    pH (UA) 5.5 02/02/2023 07:56 PM    Protein Negative 02/02/2023 07:56 PM    Glucose Negative 02/02/2023 07:56 PM    Ketone Negative 02/02/2023 07:56 PM    Bilirubin Negative 02/02/2023 07:56 PM    Urobilinogen 1.0 02/02/2023 07:56 PM    Nitrites Negative 02/02/2023 07:56 PM    Leukocyte Esterase SMALL (A) 02/02/2023 07:56 PM    Epithelial cells MODERATE (A) 02/02/2023 07:56 PM    Bacteria Negative 02/02/2023 07:56 PM    WBC 10-20 02/02/2023 07:56 PM    RBC 0-5 02/02/2023 07:56 PM       Notes reviewed from all clinical/nonclinical/nursing services involved in patient's clinical care. Care coordination discussions were held with appropriate clinical/nonclinical/ nursing providers based on care coordination needs. Patients current active Medications were reviewed, considered, added and adjusted based on the clinical condition today.       Home Medications were reconciled to the best of my ability given all available resources at the time of admission. Route is PO if not otherwise noted.       Medications Reviewed:     Current Facility-Administered Medications   Medication Dose Route Frequency    [START ON 2/6/2023] furosemide (LASIX) tablet 20 mg  20 mg Oral DAILY    [START ON 2/6/2023] azithromycin (ZITHROMAX) tablet 250 mg  250 mg Oral DAILY    furosemide (LASIX) injection 20 mg  20 mg IntraVENous BID    dexAMETHasone (DECADRON) tablet 6 mg  6 mg Oral DAILY    metoprolol tartrate (LOPRESSOR) tablet 12.5 mg  12.5 mg Oral BID    losartan (COZAAR) tablet 50 mg  50 mg Oral DAILY    cefepime (MAXIPIME) 2 g in 0.9% sodium chloride (MBP/ADV) 100 mL MBP  2 g IntraVENous Q12H    fenofibrate (LOFIBRA) tablet 160 mg  160 mg Oral DAILY    pantoprazole (PROTONIX) tablet 40 mg  40 mg Oral QHS    aspirin chewable tablet 81 mg  81 mg Oral DAILY    aluminum hydrox-magnesium carb (GAVISCON) oral suspension 15 mL  15 mL Oral QHS    sodium chloride (NS) flush 5-40 mL  5-40 mL IntraVENous Q8H    sodium chloride (NS) flush 5-40 mL  5-40 mL IntraVENous PRN    acetaminophen (TYLENOL) tablet 650 mg  650 mg Oral Q6H PRN    Or    acetaminophen (TYLENOL) suppository 650 mg  650 mg Rectal Q6H PRN    ondansetron (ZOFRAN ODT) tablet 4 mg  4 mg Oral Q8H PRN    Or    ondansetron (ZOFRAN) injection 4 mg  4 mg IntraVENous Q6H PRN    enoxaparin (LOVENOX) injection 40 mg  40 mg SubCUTAneous DAILY    polyethylene glycol (MIRALAX) packet 17 g  17 g Oral QHS     ______________________________________________________________________  EXPECTED LENGTH OF STAY: - - -  ACTUAL LENGTH OF STAY:          3                 Leatha Dale MD

## 2023-02-05 NOTE — PROGRESS NOTES
Pt currently has a temperature of 102F, no distress noted. HR of 116bpm. Antipyretic meds will be due to be given at 2033. Pt and family notified.

## 2023-02-05 NOTE — PROGRESS NOTES
Pt in bed A/O x4, no distress noted. IV site clean dry and intact. All meds given during shift per MD orders. No complaint of pain during shift. No sign of infection noted. Pt stated understanding of education to call for pain medication as needed. Bed low locked, siderails up x3. No falls noted during shift. Awaiting oncoming nurse for shift change report. Problem: Falls - Risk of  Goal: *Absence of Falls  Description: Document [place] Pancoast Fall Risk and appropriate interventions in the flowsheet. Outcome: Progressing Towards Goal  Note: Fall Risk Interventions:  Mobility Interventions: Communicate number of staff needed for ambulation/trans  Problem: Patient Education: Go to Patient Education Activity  Goal: Patient/Family Education  Outcome: Progressing Towards Goal     Problem: Cardiac Output -  Decreased  Goal: *Vital signs within specified parameters  Outcome: Progressing Towards Goal     Problem: Patient Education: Go to Patient Education Activity  Goal: Patient/Family Education  Outcome: Progressing Towards Goal     Problem: Pain  Goal: *Control of Pain  Outcome: Progressing Towards Goal  Goal: *PALLIATIVE CARE:  Alleviation of Pain  Outcome: Progressing Towards Goal     Problem: Patient Education: Go to Patient Education Activity  Goal: Patient/Family Education  Outcome: Progressing Towards Goal     Problem: Risk for Spread of Infection  Goal: Prevent transmission of infectious organism to others  Description: Prevent the transmission of infectious organisms to other patients, staff members, and visitors.   Outcome: Progressing Towards Goal     Problem: Patient Education:  Go to Education Activity  Goal: Patient/Family Education  Outcome: Progressing Towards Goal     History of Falls Interventions: Bed/chair exit alarm

## 2023-02-05 NOTE — PROGRESS NOTES
[facility] Cardiology Progress Note    Usual Cardiologist: None  Date of Service: 2/5/2023    Subjective:  Tested positive for COVID-19. Objective:    Visit Vitals  /89 (BP 1 Location: Right upper arm, BP Patient Position: At rest;Sitting)   Pulse 82   Temp 97.9 °F (36.6 °C)   Resp 18   Ht 4' 11\" (1.499 m)   Wt 53.8 kg (118 lb 9.7 oz)   SpO2 99%   BMI 23.96 kg/m²         Intake/Output Summary (Last 24 hours) at 2/5/2023 1144  Last data filed at 2/5/2023 0747  Gross per 24 hour   Intake 940 ml   Output 2325 ml   Net -1385 ml        Physical Exam  Deferred due to COVID-19 infection.     Current Facility-Administered Medications   Medication Dose Route Frequency    furosemide (LASIX) injection 20 mg  20 mg IntraVENous BID    azithromycin (ZITHROMAX) 500 mg in 0.9% sodium chloride 250 mL ([id number])  500 mg IntraVENous Q24H    dexAMETHasone (DECADRON) tablet 6 mg  6 mg Oral DAILY    metoprolol tartrate (LOPRESSOR) tablet 12.5 mg  12.5 mg Oral BID    losartan (COZAAR) tablet 50 mg  50 mg Oral DAILY    cefepime (MAXIPIME) 2 g in 0.9% sodium chloride (MBP/ADV) 100 mL MBP  2 g IntraVENous Q12H    fenofibrate (LOFIBRA) tablet 160 mg  160 mg Oral DAILY    pantoprazole (PROTONIX) tablet 40 mg  40 mg Oral QHS    aspirin chewable tablet 81 mg  81 mg Oral DAILY    aluminum hydrox-magnesium carb (GAVISCON) oral suspension 15 mL  15 mL Oral QHS    sodium chloride (NS) flush 5-40 mL  5-40 mL IntraVENous Q8H    sodium chloride (NS) flush 5-40 mL  5-40 mL IntraVENous PRN    acetaminophen (TYLENOL) tablet 650 mg  650 mg Oral Q6H PRN    Or    acetaminophen (TYLENOL) suppository 650 mg  650 mg Rectal Q6H PRN    ondansetron (ZOFRAN ODT) tablet 4 mg  4 mg Oral Q8H PRN    Or    ondansetron (ZOFRAN) injection 4 mg  4 mg IntraVENous Q6H PRN    enoxaparin (LOVENOX) injection 40 mg  40 mg SubCUTAneous DAILY    polyethylene glycol (MIRALAX) packet 17 g  17 g Oral QHS       Data Reviewed:  Recent Labs     02/05/23  0520 02/04/23  0841 02/03/23  1029 02/02/23 1938   * 128* 132* 130*   K 3.8 3.9 3.3* 3.7    95* 97 93*   CO2 28 26 28 28   * 101* 166* 134*   BUN 12 10 12 18   CREA 0.51* 0.58 0.72 0.78   CA 9.6 9.5 9.3 9.9   ALB  --   --   --  3.9   ALT  --   --   --  25     Recent Labs     02/05/23  0520 02/03/23  1029 02/02/23 1938   WBC 4.2 4.7 5.3   HGB 10.6* 9.8* 11.4*   HCT 33.2* 29.9* 34.0*    252 265     No results found for: SDES  Lab Results   Component Value Date/Time    Culture result: NO GROWTH 2 DAYS 02/02/2023 09:32 PM       All Cardiac Markers in the last 24 hours:  No results found for: CPK, CK, CKMMB, CKMB, RCK3, CKMBT, CKMBPOC, CKNDX, CKND1, CHAIM, TROPT, TROIQ, KAREEN, TROPT, TNIPOC, BNP, BNPP, BNPNT    Telemetry (personally reviewed): No recurrent SVT    Assessment/Recommendations:    SVT  Likely paroxysmal atrial tachycardia  Started  low dose beta blocker for suppression, metoprolol tartrate 12.5 mg BID  Reduced losartan to 50 mg daily to allow more BP room  No further CV testing for this is necessary at this time  Telemetry  PNA  May have PNA on top of recent COVID-19 infection  Defer management to hospitalist  Recent COVID-19 infection  COVID positive here  Pleural effusions, pulmonary edema  Likely mild diastolic CHF  NT pro-BNP modestly elevated for her age at 200  TTE shows normal LVEF, grade 1 diastolic dysfunction  CLL  Hyponatremia  Seems euvolemic to mildly volume overloaded  Would DC IVF and only allow volume with IV antibiotics; would give diuretics to keep net even to mildly net negative  Hyponatremia improved with diuresis  Can change to oral diuretic probably tomorrow, furosemide 20 mg po daily would be reasonable  Mild aortic stenosis  Recommend repeat TTE in 2-3 years  Mild/moderate mitral regurgitation  Recommend repeat TTE in 2-3 years  Moderate/severe pulmonary hypertension  Recommend screening for JYOTI  Patient assessed. High suspicion of sleep apnea due to the following reasons.  Will refer for sleep evaluation. [x] Heart Failure  [x] Hypertention  [] Atrial Fibrillation  [] BMI > 30  [] History of stroke  [] Diabetes  [] Heavy snoring  [] Witnessed apnea  [x] Hypoxemia      Anemia    Thank you for the opportunity to participate in the care of Anastasia Chandler and please do not hesitate to contact us should you have any questions. Signed:  Giancarlo Ordonez.  Norberto Proctor, [address] [place] [address] / [address] [place] [address] [facility]  02/05/23

## 2023-02-05 NOTE — PROGRESS NOTES
JOSE:    RUR: 9%    Disposition: Home with family. Patient lives alone but there is always a family member with her. Transportation: Family    Follow up: PCP/Specialist    Primary contact: Arsenio Mayer Jr(Son) [phone]    Care Management Interventions  PCP Verified by CM: Yes  Palliative Care Criteria Met (RRAT>21 & CHF Dx)?: No  Mode of Transport at Discharge:  (Family)  MyChart Signup: No  Discharge Durable Medical Equipment: No  Physical Therapy Consult: Yes  Occupational Therapy Consult: Yes  Speech Therapy Consult: No  Support Systems: Child(latoya)  The Patient and/or Patient Representative was Provided with a Choice of Provider and Agrees with the Discharge Plan?: Yes  Discharge Location  Patient Expects to be Discharged to[de-identified] Home with family assistance    Reason for Admission:  Elevated CRP                     RUR Score:   9%                  Plan for utilizing home health:  No orders        PCP: First and Last name:  Ryan Flynn MD     Name of Practice:    Are you a current patient: Yes/No: Yes   Approximate date of last visit: 1 week   Can you participate in a virtual visit with your PCP: Yes                    Current Advanced Directive/Advance Care Plan: DNR      Healthcare Decision Maker:   Click here to complete [address] including selection of the Healthcare Decision Maker Relationship (ie \"Primary\")                             Transition of Care Plan:        CM spoke with patient's son to introduce CM role, verify demographics and begin discharge planning. Patient lives in a 2 story house with a bedroom on the first level. She lives alone and one of her children is always there with her. Patient is independent with ADLs. Her children provide transportation as needed. DME: None    No history of HH or rehab. Insurance verified.     Fartun Barth RN/CRM  [phone]

## 2023-02-05 NOTE — CONSULTS
Hematology / Oncology ([facility])     Admit date: 2/2/2023   Subjective:     Rosa Alvarez is a 80 y.o. 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 Due to progressive lymphocytosis w/ anemia (nonhemolytic), she was re-treated with rituximab in 7/2018. She again required rituximab in 12/2019. Additionally, she required increased steroids and transfusion for recurrent autoimmune hemolysis. In 4/2021 she again required rituximab and steroids for autoimmune hemolytic anemia. Due to anemia with elevated LDH (but Jessica negative), she began a course of rituximab on 8/26/22. She had a COVID about a month ago and was given a course of Paxlovid. She was admitted with high fevers. Ct scan showed bilateral patchy ASDZ consistent with atypical pneumonia. She was started on azithromycin and cefepime. As of this morning she looks well. VSS. No oxygen requirement. We were asked to see her primarily because her CT showed some adenopathy that was worse compared to a 2016 CT scan.   We were asked to comment on whether she had acute progression of her CLL    Complete ROS is notable for    Patient Active Problem List    Diagnosis Date Noted    Elevated C-reactive protein (CRP) 02/02/2023    CLL (chronic lymphocytic leukemia) ([facility] 02/02/2023    Atypical pneumonia 02/02/2023    Bilateral pleural effusion 02/02/2023    Elevated brain natriuretic peptide (BNP) level 02/02/2023    Failure of outpatient treatment 02/02/2023    Post-acute sequelae of COVID-19 (PASC) 02/02/2023     Past Medical History:   Diagnosis Date    CLL (chronic lymphocytic leukemia) ([facility]     History of chemotherapy     For chronic lymphacytic leukemia    Menopause 1986      Past Surgical History:   Procedure Laterality Date    HX BREAST BIOPSY Bilateral     surgical-benign    HX HYSTERECTOMY  1986    HX OOPHORECTOMY Bilateral 1986        -Social Hx-  Social History     Tobacco Use Smoking status: Never    Smokeless tobacco: Never   Substance Use Topics    Alcohol use: Not on file        -Family Hx-  History reviewed.  No pertinent family history.     -Allergies-   Allergies   Allergen Reactions    Penicillins Anaphylaxis    Ciprofloxacin Myalgia        -Home Medications-    -Medications-  Current Facility-Administered Medications   Medication Dose Route Frequency    furosemide (LASIX) injection 20 mg  20 mg IntraVENous BID    azithromycin (ZITHROMAX) 500 mg in 0.9% sodium chloride 250 mL ([id number])  500 mg IntraVENous Q24H    dexAMETHasone (DECADRON) tablet 6 mg  6 mg Oral DAILY    metoprolol tartrate (LOPRESSOR) tablet 12.5 mg  12.5 mg Oral BID    losartan (COZAAR) tablet 50 mg  50 mg Oral DAILY    cefepime (MAXIPIME) 2 g in 0.9% sodium chloride (MBP/ADV) 100 mL MBP  2 g IntraVENous Q12H    fenofibrate (LOFIBRA) tablet 160 mg  160 mg Oral DAILY    pantoprazole (PROTONIX) tablet 40 mg  40 mg Oral QHS    aspirin chewable tablet 81 mg  81 mg Oral DAILY    aluminum hydrox-magnesium carb (GAVISCON) oral suspension 15 mL  15 mL Oral QHS    sodium chloride (NS) flush 5-40 mL  5-40 mL IntraVENous Q8H    sodium chloride (NS) flush 5-40 mL  5-40 mL IntraVENous PRN    acetaminophen (TYLENOL) tablet 650 mg  650 mg Oral Q6H PRN    Or    acetaminophen (TYLENOL) suppository 650 mg  650 mg Rectal Q6H PRN    ondansetron (ZOFRAN ODT) tablet 4 mg  4 mg Oral Q8H PRN    Or    ondansetron (ZOFRAN) injection 4 mg  4 mg IntraVENous Q6H PRN    enoxaparin (LOVENOX) injection 40 mg  40 mg SubCUTAneous DAILY    polyethylene glycol (MIRALAX) packet 17 g  17 g Oral QHS        Objective:   Patient Vitals for the past 24 hrs:   Temp Pulse Resp BP SpO2   02/05/23 0742 98.2 °F (36.8 °C) 73 18 (!) 108/40 98 %   02/05/23 0558 -- 82 -- -- --   02/05/23 0550 -- 82 -- (!) 121/51 98 %   02/05/23 0519 97.8 °F (36.6 °C) -- -- -- --   02/05/23 0356 -- 70 -- -- --   02/05/23 0155 -- 76 -- -- --   02/04/23 2300 98.4 °F (36.9 °C) 98 16 (!) 141/65 98 %   02/04/23 2155 -- 99 -- -- --   02/04/23 2150 98.2 °F (36.8 °C) -- -- -- --   02/04/23 2000 -- (!) 116 -- -- --   02/04/23 1919 (!) 102 °F (38.9 °C) (!) 108 18 (!) 169/68 94 %   02/04/23 1811 -- (!) 106 -- -- --   02/04/23 1550 100.1 °F (37.8 °C) (!) 107 -- -- --   02/04/23 1528 (!) 100.6 °F (38.1 °C) (!) 109 16 (!) 123/51 92 %   02/04/23 1405 -- (!) 107 -- -- --   02/04/23 1204 -- 85 -- -- --   02/04/23 1126 97.6 °F (36.4 °C) 88 16 (!) 105/36 98 %     02/05 0701 - 02/05 1900  In: -   Out: 225 [Urine:225]    -Physical Exam-    Pleasant, NAD  Oral cavity moist, PERRL  No KRISTINA neck / axillae  CTAB  RRR no MRG  abd S/nt/nd, no hsm or masses  extr no edema  Skin: no petechiae or excessive bruising  CN ii-xii intact, strength intact        -Labs-    Recent Labs     02/05/23  0520 02/04/23  0841 02/03/23  1029 02/02/23  1938   WBC 4.2  --  4.7 5.3   HGB 10.6*  --  9.8* 11.4*     --  252 265   ANEU 2.4  --  3.4 3.4   * 128* 132* 130*   K 3.8 3.9 3.3* 3.7   * 101* 166* 134*   BUN 12 10 12 18   CREA 0.51* 0.58 0.72 0.78   ALT  --   --   --  25   TBILI  --   --   --  0.8   AP  --   --   --  64   CA 9.6 9.5 9.3 9.9       -Imaging-       -Assessment + Plan-         *)CLL  -- last saw Dr Yara Hutchinson 2 weeks ago. No evidence of progressive CLL. Her current CT scan shows adenopathy similar to her last scan at [facility] which was in October 2020  *)fever/atypical pneumonia  -- she appears to be improving on her current antibiotic regimen.   She is afebrile  *)autoimmune hemolytic anemia  -- she is currently on dexamethasone per the hospitalist service  -- she is on prednisone 1mg every other day as an outpatient thru Dr Yara Hutchinson  *)DVT proph  -- on lovenox    Dr Lori Santos will follow up on monday

## 2023-02-06 ENCOUNTER — APPOINTMENT (OUTPATIENT)
Dept: GENERAL RADIOLOGY | Age: 88
DRG: 177 | End: 2023-02-06
Attending: STUDENT IN AN ORGANIZED HEALTH CARE EDUCATION/TRAINING PROGRAM
Payer: MEDICARE

## 2023-02-06 ENCOUNTER — APPOINTMENT (OUTPATIENT)
Dept: CT IMAGING | Age: 88
DRG: 177 | End: 2023-02-06
Attending: STUDENT IN AN ORGANIZED HEALTH CARE EDUCATION/TRAINING PROGRAM
Payer: MEDICARE

## 2023-02-06 LAB
ANION GAP SERPL CALC-SCNC: 7 MMOL/L (ref 5–15)
ATRIAL RATE: 170 BPM
ATRIAL RATE: 267 BPM
BASOPHILS # BLD: 0 K/UL (ref 0–0.1)
BASOPHILS NFR BLD: 0 % (ref 0–1)
BUN SERPL-MCNC: 20 MG/DL (ref 6–20)
BUN/CREAT SERPL: 33 (ref 12–20)
CALCIUM SERPL-MCNC: 8.4 MG/DL (ref 8.5–10.1)
CALCULATED R AXIS, ECG10: 22 DEGREES
CALCULATED R AXIS, ECG10: 57 DEGREES
CALCULATED T AXIS, ECG11: -125 DEGREES
CALCULATED T AXIS, ECG11: -89 DEGREES
CHLORIDE SERPL-SCNC: 96 MMOL/L (ref 97–108)
CO2 SERPL-SCNC: 26 MMOL/L (ref 21–32)
COMMENT, HOLDF: NORMAL
CREAT SERPL-MCNC: 0.6 MG/DL (ref 0.55–1.02)
DIAGNOSIS, 93000: NORMAL
DIAGNOSIS, 93000: NORMAL
DIFFERENTIAL METHOD BLD: ABNORMAL
EOSINOPHIL # BLD: 0 K/UL (ref 0–0.4)
EOSINOPHIL NFR BLD: 0 % (ref 0–7)
ERYTHROCYTE [DISTWIDTH] IN BLOOD BY AUTOMATED COUNT: 15 % (ref 11.5–14.5)
GLUCOSE SERPL-MCNC: 114 MG/DL (ref 65–100)
HCT VFR BLD AUTO: 25 % (ref 35–47)
HGB BLD-MCNC: 8.5 G/DL (ref 11.5–16)
IMM GRANULOCYTES # BLD AUTO: 0 K/UL (ref 0–0.04)
IMM GRANULOCYTES NFR BLD AUTO: 0 % (ref 0–0.5)
LYMPHOCYTES # BLD: 1.5 K/UL (ref 0.8–3.5)
LYMPHOCYTES NFR BLD: 22 % (ref 12–49)
MCH RBC QN AUTO: 28 PG (ref 26–34)
MCHC RBC AUTO-ENTMCNC: 34 G/DL (ref 30–36.5)
MCV RBC AUTO: 82.2 FL (ref 80–99)
MONOCYTES # BLD: 0.3 K/UL (ref 0–1)
MONOCYTES NFR BLD: 4 % (ref 5–13)
NEUTS SEG # BLD: 5.1 K/UL (ref 1.8–8)
NEUTS SEG NFR BLD: 74 % (ref 32–75)
NRBC # BLD: 0 K/UL (ref 0–0.01)
NRBC BLD-RTO: 0 PER 100 WBC
PLATELET # BLD AUTO: 305 K/UL (ref 150–400)
PMV BLD AUTO: 8.8 FL (ref 8.9–12.9)
POTASSIUM SERPL-SCNC: 3.3 MMOL/L (ref 3.5–5.1)
Q-T INTERVAL, ECG07: 244 MS
Q-T INTERVAL, ECG07: 250 MS
QRS DURATION, ECG06: 76 MS
QRS DURATION, ECG06: 76 MS
QTC CALCULATION (BEZET), ECG08: 387 MS
QTC CALCULATION (BEZET), ECG08: 417 MS
RBC # BLD AUTO: 3.04 M/UL (ref 3.8–5.2)
SAMPLES BEING HELD,HOLD: NORMAL
SODIUM SERPL-SCNC: 129 MMOL/L (ref 136–145)
VENTRICULAR RATE, ECG03: 152 BPM
VENTRICULAR RATE, ECG03: 167 BPM
WBC # BLD AUTO: 6.9 K/UL (ref 3.6–11)

## 2023-02-06 PROCEDURE — 74011250637 HC RX REV CODE- 250/637: Performed by: NURSE PRACTITIONER

## 2023-02-06 PROCEDURE — 74011250636 HC RX REV CODE- 250/636: Performed by: FAMILY MEDICINE

## 2023-02-06 PROCEDURE — 74177 CT ABD & PELVIS W/CONTRAST: CPT

## 2023-02-06 PROCEDURE — 71275 CT ANGIOGRAPHY CHEST: CPT

## 2023-02-06 PROCEDURE — 74011000250 HC RX REV CODE- 250: Performed by: NURSE PRACTITIONER

## 2023-02-06 PROCEDURE — 74011000636 HC RX REV CODE- 636: Performed by: STUDENT IN AN ORGANIZED HEALTH CARE EDUCATION/TRAINING PROGRAM

## 2023-02-06 PROCEDURE — 36415 COLL VENOUS BLD VENIPUNCTURE: CPT

## 2023-02-06 PROCEDURE — 93005 ELECTROCARDIOGRAM TRACING: CPT

## 2023-02-06 PROCEDURE — 80048 BASIC METABOLIC PNL TOTAL CA: CPT

## 2023-02-06 PROCEDURE — 87070 CULTURE OTHR SPECIMN AEROBIC: CPT

## 2023-02-06 PROCEDURE — 74011000258 HC RX REV CODE- 258: Performed by: FAMILY MEDICINE

## 2023-02-06 PROCEDURE — 71045 X-RAY EXAM CHEST 1 VIEW: CPT

## 2023-02-06 PROCEDURE — 65270000046 HC RM TELEMETRY

## 2023-02-06 PROCEDURE — 74011250636 HC RX REV CODE- 250/636: Performed by: NURSE PRACTITIONER

## 2023-02-06 PROCEDURE — 74011250637 HC RX REV CODE- 250/637: Performed by: INTERNAL MEDICINE

## 2023-02-06 PROCEDURE — 74011250637 HC RX REV CODE- 250/637: Performed by: STUDENT IN AN ORGANIZED HEALTH CARE EDUCATION/TRAINING PROGRAM

## 2023-02-06 PROCEDURE — 87449 NOS EACH ORGANISM AG IA: CPT

## 2023-02-06 PROCEDURE — 74011000250 HC RX REV CODE- 250: Performed by: STUDENT IN AN ORGANIZED HEALTH CARE EDUCATION/TRAINING PROGRAM

## 2023-02-06 PROCEDURE — 74011250636 HC RX REV CODE- 250/636: Performed by: STUDENT IN AN ORGANIZED HEALTH CARE EDUCATION/TRAINING PROGRAM

## 2023-02-06 PROCEDURE — 87899 AGENT NOS ASSAY W/OPTIC: CPT

## 2023-02-06 PROCEDURE — 85025 COMPLETE CBC W/AUTO DIFF WBC: CPT

## 2023-02-06 RX ORDER — BENZONATATE 100 MG/1
100 CAPSULE ORAL
Status: DISCONTINUED | OUTPATIENT
Start: 2023-02-06 | End: 2023-02-07 | Stop reason: HOSPADM

## 2023-02-06 RX ORDER — METOPROLOL TARTRATE 5 MG/5ML
2.5 INJECTION INTRAVENOUS ONCE
Status: COMPLETED | OUTPATIENT
Start: 2023-02-06 | End: 2023-02-06

## 2023-02-06 RX ORDER — GUAIFENESIN 600 MG/1
600 TABLET, EXTENDED RELEASE ORAL EVERY 12 HOURS
Status: DISCONTINUED | OUTPATIENT
Start: 2023-02-06 | End: 2023-02-07 | Stop reason: HOSPADM

## 2023-02-06 RX ORDER — ACETAMINOPHEN 325 MG/1
650 TABLET ORAL
Status: DISCONTINUED | OUTPATIENT
Start: 2023-02-06 | End: 2023-02-07 | Stop reason: HOSPADM

## 2023-02-06 RX ORDER — KETOROLAC TROMETHAMINE 30 MG/ML
15 INJECTION, SOLUTION INTRAMUSCULAR; INTRAVENOUS
Status: COMPLETED | OUTPATIENT
Start: 2023-02-06 | End: 2023-02-06

## 2023-02-06 RX ORDER — ACETAMINOPHEN 650 MG/1
650 SUPPOSITORY RECTAL
Status: DISCONTINUED | OUTPATIENT
Start: 2023-02-06 | End: 2023-02-07 | Stop reason: HOSPADM

## 2023-02-06 RX ADMIN — SODIUM CHLORIDE, PRESERVATIVE FREE 10 ML: 5 INJECTION INTRAVENOUS at 13:59

## 2023-02-06 RX ADMIN — CEFEPIME 2 G: 2 INJECTION, POWDER, FOR SOLUTION INTRAVENOUS at 11:10

## 2023-02-06 RX ADMIN — ACETAMINOPHEN 650 MG: 325 TABLET ORAL at 18:08

## 2023-02-06 RX ADMIN — CEFEPIME 2 G: 2 INJECTION, POWDER, FOR SOLUTION INTRAVENOUS at 22:24

## 2023-02-06 RX ADMIN — ACETAMINOPHEN 650 MG: 325 TABLET ORAL at 09:18

## 2023-02-06 RX ADMIN — METOPROLOL TARTRATE 12.5 MG: 25 TABLET, FILM COATED ORAL at 09:18

## 2023-02-06 RX ADMIN — FUROSEMIDE 20 MG: 20 TABLET ORAL at 09:18

## 2023-02-06 RX ADMIN — LOSARTAN POTASSIUM 50 MG: 50 TABLET, FILM COATED ORAL at 09:17

## 2023-02-06 RX ADMIN — POLYETHYLENE GLYCOL 3350 17 G: 17 POWDER, FOR SOLUTION ORAL at 21:04

## 2023-02-06 RX ADMIN — ALUMINUM HYDROXIDE AND MAGNESIUM CARBONATE 15 ML: 95; 358 LIQUID ORAL at 21:04

## 2023-02-06 RX ADMIN — ENOXAPARIN SODIUM 40 MG: 100 INJECTION SUBCUTANEOUS at 09:18

## 2023-02-06 RX ADMIN — GUAIFENESIN 600 MG: 600 TABLET, EXTENDED RELEASE ORAL at 21:04

## 2023-02-06 RX ADMIN — DEXAMETHASONE 6 MG: 4 TABLET ORAL at 09:17

## 2023-02-06 RX ADMIN — POTASSIUM BICARBONATE 40 MEQ: 782 TABLET, EFFERVESCENT ORAL at 13:59

## 2023-02-06 RX ADMIN — SODIUM CHLORIDE, PRESERVATIVE FREE 10 ML: 5 INJECTION INTRAVENOUS at 06:19

## 2023-02-06 RX ADMIN — ASPIRIN 81 MG: 81 TABLET, CHEWABLE ORAL at 09:18

## 2023-02-06 RX ADMIN — FENOFIBRATE 160 MG: 160 TABLET ORAL at 09:17

## 2023-02-06 RX ADMIN — SODIUM CHLORIDE 500 ML: 9 INJECTION, SOLUTION INTRAVENOUS at 03:09

## 2023-02-06 RX ADMIN — KETOROLAC TROMETHAMINE 15 MG: 30 INJECTION, SOLUTION INTRAMUSCULAR; INTRAVENOUS at 03:09

## 2023-02-06 RX ADMIN — ACETAMINOPHEN 650 MG: 325 TABLET ORAL at 22:24

## 2023-02-06 RX ADMIN — ACETAMINOPHEN 650 MG: 325 TABLET ORAL at 02:18

## 2023-02-06 RX ADMIN — PANTOPRAZOLE SODIUM 40 MG: 40 TABLET, DELAYED RELEASE ORAL at 21:04

## 2023-02-06 RX ADMIN — GUAIFENESIN 600 MG: 600 TABLET, EXTENDED RELEASE ORAL at 13:59

## 2023-02-06 RX ADMIN — METOPROLOL TARTRATE 12.5 MG: 25 TABLET, FILM COATED ORAL at 17:44

## 2023-02-06 RX ADMIN — AZITHROMYCIN MONOHYDRATE 250 MG: 250 TABLET ORAL at 11:10

## 2023-02-06 RX ADMIN — ACETAMINOPHEN 650 MG: 325 TABLET ORAL at 13:59

## 2023-02-06 RX ADMIN — IOPAMIDOL 100 ML: 755 INJECTION, SOLUTION INTRAVENOUS at 20:00

## 2023-02-06 RX ADMIN — METOPROLOL TARTRATE 2.5 MG: 5 INJECTION, SOLUTION INTRAVENOUS at 03:09

## 2023-02-06 NOTE — PROGRESS NOTES
[address] visit attempted. Mrs. Suerkha Simmons is [address]. She is unable to receive communion due to medical restrictions. Prayer for spiritual communion offered outside her room.     CASTRO Pineda, RN, ACSW, LCSW   Page:  [phone]

## 2023-02-06 NOTE — CONSULTS
[facility]  Pulmonary, Critical Care, and Sleep Medicine    Initial Patient Consult    Name: Jorge Smith MRN: [id number]   : 1933 [facility]    Date: 2023        IMPRESSION:    Abnl chest imaging -- ggo bilaterally and  rml/ lingular scarring - ? Covid pna changes vs other   Spiking temps up to 103 - ? Superimposed bacterial pna or pte or other   Cll with diffuse lymphadenopathy - per Dr. 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 / procal 0.50 // crp 14    New? Finding of pulm htn with pap 63  Elevated bnp 1062  Svt associated with temp spikes    Hyponatremia    Mild pyurina - 10-20 wbc   Blood tinges sputum   Persistent covid positive test - not uncommon  or unexpected for cll   Cll - not currently on  any specific tx   Mrsa screen neg   Dnr code status           RECOMMENDATIONS:   Cta  to eval for pte and to eval for any abd/pelvis pathology . Risks and benefits discussed with pt , dtr and dtr in  law over the phone    Urine ag    Fu bc - so far 2 separate tests neg   Fu sputum culture - just  sent on    Continue current ab    Continue lovenox  pd cta -- watch sputum expectorant . O2 prn   Agree with dex    Check crp in am      Subjective: This patient has been seen and evaluated at the request of Dr. Gerri Serrano  for spking temps and pulm infiltrates in pt who tested positive for covid  ON Wing 3, 2023 . Patient is a 80 y.o. female who is followed by Dr. Eboni Fisher at [facility] for cll and autoimmune hemolytic anemia .   Pt was tx with paxlovid in  for covid with resp s/s of cough and chest congestion and  hayes. Pt says she did not  spike a temp at the time. The cough and congestion improved but did not resolve  About a week ago she developed spiking temps and was placed on doxy for pulm s/s and possible uti . She came to er later  in the week and was admitted. Despite broad spectrum ab she continues to spike temps of up to 103 with associated svt. Bc are so far neg , covid positive  , rvp positive only for covid , rsv neg, urine not sent, sputum just sent . Echo showed lvef 70-75 % but  notable for pap 63  and mild as and mild to moderate mr . Pt was sitting in chair on ra when I came in . Sats last recorded 97 %. NO distress. [place]. Discussed with DTr and with dtr in law over the phone ( who is a nurse. ). Pt expectorated bloody sputum . Cxr today looked concerning for increased  asdz and small pleural effusions compared to 2/4 . Chest ct non - contrast reviewed with attending  and did show ggo and some scarring and small non - tapable pleural effusions. IN addition to above, pt denies cp or  pleurisy . She admits says her ears are stopped up. Past Medical History:   Diagnosis Date    CLL (chronic lymphocytic leukemia) ([facility] [address]     History of chemotherapy     For chronic lymphacytic leukemia    Menopause 1986    Autoimmune hemolytic anemia   Past Surgical History:   Procedure Laterality Date    HX BREAST BIOPSY Bilateral     surgical-benign    HX HYSTERECTOMY  1986    HX OOPHORECTOMY Bilateral 1986      Prior to Admission medications    Not on File   Not on chemo / pred 1 mg every other day . Allergies   Allergen Reactions    Penicillins Anaphylaxis    Ciprofloxacin Myalgia      Social History     Tobacco Use    Smoking status: Never    Smokeless tobacco: Never   Substance Use Topics    Alcohol use: Not on file      History reviewed. No pertinent family history.      Current Facility-Administered Medications   Medication Dose Route Frequency guaiFENesin ER (MUCINEX) tablet 600 mg  600 mg Oral Q12H    furosemide (LASIX) tablet 20 mg  20 mg Oral DAILY    azithromycin (ZITHROMAX) tablet 250 mg  250 mg Oral DAILY    dexAMETHasone (DECADRON) tablet 6 mg  6 mg Oral DAILY    metoprolol tartrate (LOPRESSOR) tablet 12.5 mg  12.5 mg Oral BID    losartan (COZAAR) tablet 50 mg  50 mg Oral DAILY    cefepime (MAXIPIME) 2 g in 0.9% sodium chloride (MBP/ADV) 100 mL MBP  2 g IntraVENous Q12H    fenofibrate (LOFIBRA) tablet 160 mg  160 mg Oral DAILY    pantoprazole (PROTONIX) tablet 40 mg  40 mg Oral QHS    aspirin chewable tablet 81 mg  81 mg Oral DAILY    aluminum hydrox-magnesium carb (GAVISCON) oral suspension 15 mL  15 mL Oral QHS    sodium chloride (NS) flush 5-40 mL  5-40 mL IntraVENous Q8H    enoxaparin (LOVENOX) injection 40 mg  40 mg SubCUTAneous DAILY    polyethylene glycol (MIRALAX) packet 17 g  17 g Oral QHS       Review of Systems:  A comprehensive review of systems was negative except for that written in the HPI. Objective:   Vital Signs:    Visit Vitals  BP (!) 124/55 (BP 1 Location: Left upper arm, BP Patient Position: Sitting)   Pulse 89   Temp 97.5 °F (36.4 °C)   Resp 18   Ht 4' 11\" (1.499 m)   Wt 53.8 kg (118 lb 9.7 oz)   SpO2 97%   BMI 23.96 kg/m²       O2 Device: Nasal cannula   O2 Flow Rate (L/min): 2 l/min   Temp (24hrs), Av.2 °F (37.3 °C), Min:97.3 °F (36.3 °C), Max:103 °F (39.4 °C)       Intake/Output:   Last shift:       07 -  1900  In: 240 [P.O.:240]  Out: 550 [Urine:550]  Last 3 shifts:  190 -  0700  In: 740 [P.O.:740]  Out: 0358 [Urine:3375]    Intake/Output Summary (Last 24 hours) at 2023 1544  Last data filed at 2023 1516  Gross per 24 hour   Intake 240 ml   Output 1250 ml   Net -1010 ml      Physical Exam:   General:  Alert, cooperative, no distress, appears stated age. Does not look toxic    Head:  Normocephalic, without obvious abnormality, atraumatic. Eyes:  Conjunctivae/corneas clear.  PERRL, EOMs intact. Nose: Nares normal. No heme    Throat: Lips, mucosa, and tongue normal. Teeth and gums normal.no heme    Neck: Supple,non - tender    Back:   Symmetric, mild  lordotic curve    Lungs:   Diffuse rales    Chest wall:  No tenderness or deformity. Heart:  Regular rate and rhythm, S1, S2 normal, no murmur, click, rub or gallop   Abdomen:   Soft, non-tender. Extremities: Extremities normal, atraumatic, no cyanosis or edema. Pulses: Radial present    Skin:  Ecchymosis    Lymph nodes: Cervical, supraclavicular, and axillary non - palpable -- despite being seen on ct    Neurologic: Grossly nonfocal-- [facility]     Data review:     Recent Results (from the past 24 hour(s))   EKG, 12 LEAD, INITIAL    Collection Time: 02/06/23  2:34 AM   Result Value Ref Range    Ventricular Rate 152 BPM    Atrial Rate 170 BPM    QRS Duration 76 ms    Q-T Interval 244 ms    QTC Calculation (Bezet) 387 ms    Calculated R Axis 22 degrees    Calculated T Axis -89 degrees    Diagnosis       Atrial fibrillation  Inferior infarct , age undetermined  When compared with ECG of 02-FEB-2023 20:32,  Atrial fibrillation has replaced Sinus rhythm  Vent. rate has increased BY  69 BPM  Criteria for Septal infarct are no longer present  Inferior infarct is now present  ST no longer depressed in Inferior leads  Nonspecific T wave abnormality now evident in Lateral leads  Confirmed by Charity Delaney M.D., Julianna Quintana ([id number]) on 2/6/2023 1:43:07 PM     EKG, 12 LEAD, INITIAL    Collection Time: 02/06/23  2:35 AM   Result Value Ref Range    Ventricular Rate 167 BPM    Atrial Rate 267 BPM    QRS Duration 76 ms    Q-T Interval 250 ms    QTC Calculation (Bezet) 417 ms    Calculated R Axis 57 degrees    Calculated T Axis -125 degrees    Diagnosis       Supraventricular tachycardia  When compared with ECG of 02-FEB-2023 20:32,  Vent.  rate has increased BY  84 BPM  Criteria for Septal infarct are no longer present  Non-specific change in ST segment in Anterior leads  T wave inversion now evident in Lateral leads  Confirmed by Tara Castillo M.D., Sean Rosales ([id number]) on 2/6/2023 1:55:31 PM     CBC WITH AUTOMATED DIFF    Collection Time: 02/06/23  6:54 AM   Result Value Ref Range    WBC 6.9 3.6 - 11.0 K/uL    RBC 3.04 (L) 3.80 - 5.20 M/uL    HGB 8.5 (L) 11.5 - 16.0 g/dL    HCT 25.0 (L) 35.0 - 47.0 %    MCV 82.2 80.0 - 99.0 FL    MCH 28.0 26.0 - 34.0 PG    MCHC 34.0 30.0 - 36.5 g/dL    RDW 15.0 (H) 11.5 - 14.5 %    PLATELET 307 506 - 989 K/uL    MPV 8.8 (L) 8.9 - 12.9 FL    NRBC 0.0 0  WBC    ABSOLUTE NRBC 0.00 0.00 - 0.01 K/uL    NEUTROPHILS 74 32 - 75 %    LYMPHOCYTES 22 12 - 49 %    MONOCYTES 4 (L) 5 - 13 %    EOSINOPHILS 0 0 - 7 %    BASOPHILS 0 0 - 1 %    IMMATURE GRANULOCYTES 0 0.0 - 0.5 %    ABS. NEUTROPHILS 5.1 1.8 - 8.0 K/UL    ABS. LYMPHOCYTES 1.5 0.8 - 3.5 K/UL    ABS. MONOCYTES 0.3 0.0 - 1.0 K/UL    ABS. EOSINOPHILS 0.0 0.0 - 0.4 K/UL    ABS. BASOPHILS 0.0 0.0 - 0.1 K/UL    ABS. IMM. GRANS. 0.0 0.00 - 0.04 K/UL    DF AUTOMATED     METABOLIC PANEL, BASIC    Collection Time: 02/06/23  6:54 AM   Result Value Ref Range    Sodium 129 (L) 136 - 145 mmol/L    Potassium 3.3 (L) 3.5 - 5.1 mmol/L    Chloride 96 (L) 97 - 108 mmol/L    CO2 26 21 - 32 mmol/L    Anion gap 7 5 - 15 mmol/L    Glucose 114 (H) 65 - 100 mg/dL    BUN 20 6 - 20 MG/DL    Creatinine 0.60 0.55 - 1.02 MG/DL    BUN/Creatinine ratio 33 (H) 12 - 20      eGFR >60 >60 ml/min/1.73m2    Calcium 8.4 (L) 8.5 - 10.1 MG/DL       Imaging:  I have personally reviewed the patients radiographs and have reviewed the reports:  Chest imaging  with cxr and ct viewed and as above.           Sadia Whitehead MD

## 2023-02-06 NOTE — PROGRESS NOTES
Hospitalist Night Cover     Name: Nany De Leon  YOB: 1933      Overnight update:        Alphonzo Bernheim is a 81yo with a PMH of CLL who is admitted to room 444 with possible atypical CAP with failed outpatient treatment. Notified by RN of elevated temperature and heart rate. Seen and examined patient at bedside. Daughter in law present. DIL states that patient's heart rate gets elevated when patient febrile. She was seen by cardiology and metoprolol 12.5mg BID started for atrial tachycardia. Temp 101.7, Tylenol given and ice packs placed. Reassessed- Patient's heart rate lower 100's, Temp 98.4    0200 Notified by RN that heart rate elevated 164, temp 103.0. EKG completed showing SVT rate of 167. Discussed with Dr. Gregory Tellez,  Metoprolol 2.5mg IVP and NS 500ml bolus ordered.         Sagrario Mejia [facility]-NP

## 2023-02-06 NOTE — PROGRESS NOTES
1930-Received in report that the pt IV infiltrated at approximately noon on 2/5/23 and that new IV access hadn't been established. 2030-Discussed with pt daughter-in-law the importance of pt having IV access. Pt daughter-in-law declined new IV placement at this time. Stating Rachel Parker was told by the provider today that all medications would be switched to PO and \"she's getting discharged tomorrow, she's a DNR and her arms are bruised\". 2048-Informed provider that the family member would like to speak with her. 2100-Provider at bedside talking with pt daughter. Daughter-in-law amendable to IV placement but request to place the IV herself. Provider approved of this. 22G LT FA IV placed by pt daughter. 2030-Pt had episode of -175. Provider notified. 2210-Pt had 9 beat run of V-Tach. Pt HR is sustaining 140-175. Provider notified. 0205-provider notified that pt continues to have frequent episodes of -170's. Pt HR doesn't sustain in 140-170s. New orders received and implemented. Per provider, infuse bolus feeding slowly. 0245-EKG done    0502-per provider, NS bolus stopped. 417ml infused. Temperature 97.9 and HR-89. Lungs clear. AM labs collected. Safety precautions maintained. Pt daughter in law in room. Will continue to monitor.

## 2023-02-06 NOTE — PROGRESS NOTES
[address] Adult  Hospitalist Group                                                                                          Hospitalist Progress Note  Javid Moody MD  Answering service: [phone] from in house phone        Date of Service:  2023  NAME:  Baljinder Garcia  :  1933  MRN:  [id number]       Admission Summary:   Baljinder Garcia is a 80 y.o. female who presents with fevers. 51-year-old  female with past medical history of CLL who follows with oncology and COVID diagnosed 1 month ago who was transferred here for the above. Patient reports that she has been having on and off fevers for about a week. She reports that these fevers are at night or in the morning and if ranged to a temperature max of 102 °F.  She reports she had COVID 1 month ago and was told by her primary doctor that if she had fevers and shortness of breath to go to an ER. She reports that she has been having increased cough along with her fevers, for about 3 weeks now. She denies any hemoptysis. She does report some productive cough since having had COVID. She and her family report that she has been started on doxycycline by her primary care provider for concern for pneumonia and possible UTI. She did have a CT yesterday which showed patchy airspace disease and bilateral pleural effusions. She reports increased shortness of breath with exertion but not at rest.  She denies any hypoxia, syncope, chest pain, palpitations. At the time of my exam, patient is laying comfortably in bed with family at bedside. No acute complaints at this time. Patient was found to have elevated BNP at 1062 and, as mentioned before, CT showing scarring versus inflammatory versus infectious and pleural effusion. Interval history / Subjective:   Patient seen and examined earlier this morning by me for follow-up of atypical pneumonia and pleural effusions.   Patient had another episode of fever overnight with elevation of heart rate into SVT. Patient was placed back on oxygen, unclear if she desaturated. Family at bedside. Assessment & Plan:     Likely atypical CAP with failed outpatient treatment  Previous COVID infection likely influencing  Patient was given cefepime we will continue on cefepime for now  Continuous pulse ox  Monitor with labs  CT shows bilateral groundglass opacities as well  Consider sequelae of COVID  Respiratory virus panel negative except for COVID  Patient is being treated for atypical pneumonia and COVID at this time  Unclear     Bilateral pleural effusion, some concern for heart failure  Mild diastolic CHF grade 1  Switching to p.o. Lasix tomorrow  Likely discharge with some p.o.  Lasix  Metoprolol started per cardiology    Atrial tachycardia  Metoprolol started by cardiology     CLL  Oncology has seen  Continue current management, disease stable     Previous COVID-19 infection  Patient reports that she has remained positive since her infection   Dexamethasone 10 mg p.o. daily     Constipation  MiraLAX     Code status: DNR  Prophylaxis: Lovenox  Care Plan discussed with: Patient, nurse, family, and subspecialist  Anticipated Disposition: 24 hours home  Inpatient  Cardiac monitoring: Telemetry     Hospital Problems  Never Reviewed            Codes Class Noted POA    Elevated C-reactive protein (CRP) ICD-10-CM: R79.82  ICD-9-CM: 790.95  2/2/2023 Unknown        [facility] ICD-10-CM: C91.10  ICD-9-CM: 204.10  2/2/2023 Unknown        Atypical pneumonia ICD-10-CM: J18.9  ICD-9-CM: 831  2/2/2023 Unknown        Bilateral pleural effusion ICD-10-CM: J90  ICD-9-CM: 511.9  2/2/2023 Unknown        Elevated brain natriuretic peptide (BNP) level ICD-10-CM: R79.89  ICD-9-CM: 790.99  2/2/2023 Unknown        Failure of outpatient treatment ICD-10-CM: Z78.9  ICD-9-CM: V49.89  2/2/2023 Unknown        Post-acute sequelae of COVID-19 ([facility]) ICD-10-CM: U09.9  ICD-9-CM: 139.8  2/2/2023 Unknown         Social Determinants of Health     Tobacco Use: Low Risk     Smoking Tobacco Use: Never    Smokeless Tobacco Use: Never    Passive Exposure: Not on file   Alcohol Use: Not on file   Financial Resource Strain: Not on file   Food Insecurity: Not on file   Transportation Needs: Not on file   Physical Activity: Not on file   Stress: Not on file   Social Connections: Not on file   Intimate Partner Violence: Not on file   Depression: Not on file   Housing Stability: Not on file       Review of Systems:   A comprehensive review of systems was negative except for that written in the HPI. Vital Signs:    Last 24hrs VS reviewed since prior progress note. Most recent are:  Visit Vitals  BP (!) 116/54 (BP 1 Location: Left upper arm, BP Patient Position: At rest)   Pulse 76   Temp 97.9 °F (36.6 °C)   Resp 18   Ht 4' 11\" (1.499 m)   Wt 53.8 kg (118 lb 9.7 oz)   SpO2 97%   BMI 23.96 kg/m²         Intake/Output Summary (Last 24 hours) at 2/6/2023 1348  Last data filed at 2/6/2023 9063  Gross per 24 hour   Intake 240 ml   Output 900 ml   Net -660 ml          Physical Examination:     I had a face to face encounter with this patient and independently examined them on 2/6/2023 as outlined below:          Constitutional:  No acute distress, cooperative, pleasant    ENT:  Oral mucosa moist, oropharynx benign. Resp: Bilateral crackles, improved  No rhonchi or wheezing. No respiratory distress. No tachypnea. CV:  Regular rhythm, normal rate, no murmurs, gallops, rubs    GI:  Soft, non distended, non tender. normoactive bowel sounds, no hepatosplenomegaly     Musculoskeletal:  No edema, warm, 2+ pulses throughout    Neurologic:  Moves all extremities.   AAOx3, CN II-XII reviewed            Data Review:    Review and/or order of clinical lab test  Review and/or order of tests in the radiology section of CPT  Review and/or order of tests in the medicine section of CPT    I have independently reviewed and interpreted patient's lab and all other diagnostic data    Labs:     Recent Labs     02/06/23  0654 02/05/23  0520   WBC 6.9 4.2   HGB 8.5* 10.6*   HCT 25.0* 33.2*    326       Recent Labs     02/06/23  0654 02/05/23  0520 02/04/23  0841   * 134* 128*   K 3.3* 3.8 3.9   CL 96* 100 95*   CO2 26 28 26   BUN 20 12 10   CREA 0.60 0.51* 0.58   * 151* 101*   CA 8.4* 9.6 9.5       No results for input(s): ALT, AP, TBIL, TBILI, TP, ALB, GLOB, GGT, AML, LPSE in the last 72 hours. No lab exists for component: SGOT, GPT, AMYP, HLPSE    No results for input(s): INR, PTP, APTT, INREXT, INREXT in the last 72 hours. No results for input(s): FE, TIBC, PSAT, FERR in the last 72 hours. No results found for: FOL, RBCF   No results for input(s): PH, PCO2, PO2 in the last 72 hours. No results for input(s): CPK, CKNDX, TROIQ in the last 72 hours. No lab exists for component: CPKMB  No results found for: CHOL, CHOLX, CHLST, CHOLV, HDL, HDLP, LDL, LDLC, DLDLP, TGLX, TRIGL, TRIGP, CHHD, CHHDX  No results found for: [facility]  Lab Results   Component Value Date/Time    Color YELLOW/STRAW 02/02/2023 07:56 PM    Appearance CLEAR 02/02/2023 07:56 PM    Specific gravity 1.020 02/02/2023 07:56 PM    pH (UA) 5.5 02/02/2023 07:56 PM    Protein Negative 02/02/2023 07:56 PM    Glucose Negative 02/02/2023 07:56 PM    Ketone Negative 02/02/2023 07:56 PM    Bilirubin Negative 02/02/2023 07:56 PM    Urobilinogen 1.0 02/02/2023 07:56 PM    Nitrites Negative 02/02/2023 07:56 PM    Leukocyte Esterase SMALL (A) 02/02/2023 07:56 PM    Epithelial cells MODERATE (A) 02/02/2023 07:56 PM    Bacteria Negative 02/02/2023 07:56 PM    WBC 10-20 02/02/2023 07:56 PM    RBC 0-5 02/02/2023 07:56 PM       Notes reviewed from all clinical/nonclinical/nursing services involved in patient's clinical care.  Care coordination discussions were held with appropriate clinical/nonclinical/ nursing providers based on care coordination needs. Patients current active Medications were reviewed, considered, added and adjusted based on the clinical condition today. Home Medications were reconciled to the best of my ability given all available resources at the time of admission. Route is PO if not otherwise noted.       Medications Reviewed:     Current Facility-Administered Medications   Medication Dose Route Frequency    acetaminophen (TYLENOL) tablet 650 mg  650 mg Oral Q4H PRN    Or    acetaminophen (TYLENOL) suppository 650 mg  650 mg Rectal Q4H PRN    potassium bicarb-citric acid (EFFER-K) tablet 40 mEq  40 mEq Oral NOW    benzonatate (TESSALON) capsule 100 mg  100 mg Oral TID PRN    guaiFENesin ER (MUCINEX) tablet 600 mg  600 mg Oral Q12H    furosemide (LASIX) tablet 20 mg  20 mg Oral DAILY    azithromycin (ZITHROMAX) tablet 250 mg  250 mg Oral DAILY    dexAMETHasone (DECADRON) tablet 6 mg  6 mg Oral DAILY    metoprolol tartrate (LOPRESSOR) tablet 12.5 mg  12.5 mg Oral BID    losartan (COZAAR) tablet 50 mg  50 mg Oral DAILY    cefepime (MAXIPIME) 2 g in 0.9% sodium chloride (MBP/ADV) 100 mL MBP  2 g IntraVENous Q12H    fenofibrate (LOFIBRA) tablet 160 mg  160 mg Oral DAILY    pantoprazole (PROTONIX) tablet 40 mg  40 mg Oral QHS    aspirin chewable tablet 81 mg  81 mg Oral DAILY    aluminum hydrox-magnesium carb (GAVISCON) oral suspension 15 mL  15 mL Oral QHS    sodium chloride (NS) flush 5-40 mL  5-40 mL IntraVENous Q8H    sodium chloride (NS) flush 5-40 mL  5-40 mL IntraVENous PRN    ondansetron (ZOFRAN ODT) tablet 4 mg  4 mg Oral Q8H PRN    Or    ondansetron (ZOFRAN) injection 4 mg  4 mg IntraVENous Q6H PRN    enoxaparin (LOVENOX) injection 40 mg  40 mg SubCUTAneous DAILY    polyethylene glycol (MIRALAX) packet 17 g  17 g Oral QHS     ______________________________________________________________________  EXPECTED LENGTH OF STAY: - - -  ACTUAL LENGTH OF STAY:          [phone] [facility] Samaria Coulter MD

## 2023-02-07 VITALS
TEMPERATURE: 99.2 F | HEIGHT: 59 IN | RESPIRATION RATE: 28 BRPM | WEIGHT: 119.49 LBS | BODY MASS INDEX: 24.09 KG/M2 | SYSTOLIC BLOOD PRESSURE: 119 MMHG | OXYGEN SATURATION: 98 % | HEART RATE: 96 BPM | DIASTOLIC BLOOD PRESSURE: 51 MMHG

## 2023-02-07 LAB
ANION GAP SERPL CALC-SCNC: 8 MMOL/L (ref 5–15)
BASOPHILS # BLD: 0 K/UL (ref 0–0.1)
BASOPHILS NFR BLD: 0 % (ref 0–1)
BUN SERPL-MCNC: 15 MG/DL (ref 6–20)
BUN/CREAT SERPL: 30 (ref 12–20)
CALCIUM SERPL-MCNC: 9.3 MG/DL (ref 8.5–10.1)
CHLORIDE SERPL-SCNC: 98 MMOL/L (ref 97–108)
CO2 SERPL-SCNC: 26 MMOL/L (ref 21–32)
CREAT SERPL-MCNC: 0.5 MG/DL (ref 0.55–1.02)
CRP SERPL-MCNC: 14.2 MG/DL (ref 0–0.6)
DIFFERENTIAL METHOD BLD: ABNORMAL
EOSINOPHIL # BLD: 0 K/UL (ref 0–0.4)
EOSINOPHIL NFR BLD: 0 % (ref 0–7)
ERYTHROCYTE [DISTWIDTH] IN BLOOD BY AUTOMATED COUNT: 15.3 % (ref 11.5–14.5)
GLUCOSE SERPL-MCNC: 123 MG/DL (ref 65–100)
HCT VFR BLD AUTO: 32.3 % (ref 35–47)
HGB BLD-MCNC: 10.4 G/DL (ref 11.5–16)
IMM GRANULOCYTES # BLD AUTO: 0 K/UL (ref 0–0.04)
IMM GRANULOCYTES NFR BLD AUTO: 0 % (ref 0–0.5)
LYMPHOCYTES # BLD: 1.9 K/UL (ref 0.8–3.5)
LYMPHOCYTES NFR BLD: 25 % (ref 12–49)
M PNEUMO IGG SER IA-ACNC: <100 U/ML (ref 0–99)
M PNEUMO IGM SER IA-ACNC: <770 U/ML (ref 0–769)
MCH RBC QN AUTO: 27.8 PG (ref 26–34)
MCHC RBC AUTO-ENTMCNC: 32.2 G/DL (ref 30–36.5)
MCV RBC AUTO: 86.4 FL (ref 80–99)
MONOCYTES # BLD: 0.3 K/UL (ref 0–1)
MONOCYTES NFR BLD: 4 % (ref 5–13)
NEUTS SEG # BLD: 5.2 K/UL (ref 1.8–8)
NEUTS SEG NFR BLD: 71 % (ref 32–75)
NRBC # BLD: 0 K/UL (ref 0–0.01)
NRBC BLD-RTO: 0 PER 100 WBC
PLATELET # BLD AUTO: 361 K/UL (ref 150–400)
PMV BLD AUTO: 9 FL (ref 8.9–12.9)
POTASSIUM SERPL-SCNC: 4.3 MMOL/L (ref 3.5–5.1)
RBC # BLD AUTO: 3.74 M/UL (ref 3.8–5.2)
RBC MORPH BLD: ABNORMAL
SODIUM SERPL-SCNC: 132 MMOL/L (ref 136–145)
WBC # BLD AUTO: 7.4 K/UL (ref 3.6–11)

## 2023-02-07 PROCEDURE — 85025 COMPLETE CBC W/AUTO DIFF WBC: CPT

## 2023-02-07 PROCEDURE — 80048 BASIC METABOLIC PNL TOTAL CA: CPT

## 2023-02-07 PROCEDURE — 86140 C-REACTIVE PROTEIN: CPT

## 2023-02-07 PROCEDURE — 74011000258 HC RX REV CODE- 258: Performed by: FAMILY MEDICINE

## 2023-02-07 PROCEDURE — 36415 COLL VENOUS BLD VENIPUNCTURE: CPT

## 2023-02-07 PROCEDURE — 74011000250 HC RX REV CODE- 250: Performed by: STUDENT IN AN ORGANIZED HEALTH CARE EDUCATION/TRAINING PROGRAM

## 2023-02-07 PROCEDURE — 74011250637 HC RX REV CODE- 250/637: Performed by: INTERNAL MEDICINE

## 2023-02-07 PROCEDURE — 74011250636 HC RX REV CODE- 250/636: Performed by: FAMILY MEDICINE

## 2023-02-07 PROCEDURE — 74011250637 HC RX REV CODE- 250/637: Performed by: NURSE PRACTITIONER

## 2023-02-07 PROCEDURE — 74011250637 HC RX REV CODE- 250/637: Performed by: STUDENT IN AN ORGANIZED HEALTH CARE EDUCATION/TRAINING PROGRAM

## 2023-02-07 PROCEDURE — 74011250636 HC RX REV CODE- 250/636: Performed by: STUDENT IN AN ORGANIZED HEALTH CARE EDUCATION/TRAINING PROGRAM

## 2023-02-07 PROCEDURE — 77010033678 HC OXYGEN DAILY

## 2023-02-07 RX ORDER — FUROSEMIDE 20 MG/1
TABLET ORAL
Qty: 20 TABLET | Refills: 0 | Status: SHIPPED | OUTPATIENT
Start: 2023-02-07 | End: 2023-02-22

## 2023-02-07 RX ORDER — POTASSIUM CHLORIDE 20 MEQ/1
20 TABLET, EXTENDED RELEASE ORAL DAILY
Qty: 15 TABLET | Refills: 0 | Status: SHIPPED | OUTPATIENT
Start: 2023-02-07

## 2023-02-07 RX ORDER — METOPROLOL TARTRATE 25 MG/1
25 TABLET, FILM COATED ORAL EVERY 12 HOURS
Qty: 60 TABLET | Refills: 0 | Status: SHIPPED | OUTPATIENT
Start: 2023-02-08

## 2023-02-07 RX ORDER — GUAIFENESIN 100 MG/5ML
81 LIQUID (ML) ORAL DAILY
Qty: 30 TABLET | Refills: 0 | Status: SHIPPED | OUTPATIENT
Start: 2023-02-08

## 2023-02-07 RX ORDER — AZITHROMYCIN 250 MG/1
250 TABLET, FILM COATED ORAL DAILY
Qty: 5 TABLET | Refills: 0 | Status: SHIPPED | OUTPATIENT
Start: 2023-02-07

## 2023-02-07 RX ORDER — METOPROLOL TARTRATE 25 MG/1
25 TABLET, FILM COATED ORAL EVERY 12 HOURS
Status: DISCONTINUED | OUTPATIENT
Start: 2023-02-08 | End: 2023-02-07 | Stop reason: HOSPADM

## 2023-02-07 RX ORDER — GUAIFENESIN 600 MG/1
600 TABLET, EXTENDED RELEASE ORAL EVERY 12 HOURS
Qty: 30 TABLET | Refills: 0 | Status: SHIPPED | OUTPATIENT
Start: 2023-02-07

## 2023-02-07 RX ORDER — CEFDINIR 300 MG/1
300 CAPSULE ORAL 2 TIMES DAILY
Qty: 20 CAPSULE | Refills: 0 | Status: SHIPPED | OUTPATIENT
Start: 2023-02-07

## 2023-02-07 RX ORDER — DEXAMETHASONE 6 MG/1
6 TABLET ORAL
Qty: 6 TABLET | Refills: 0 | Status: SHIPPED | OUTPATIENT
Start: 2023-02-08

## 2023-02-07 RX ADMIN — ACETAMINOPHEN 650 MG: 325 TABLET ORAL at 14:58

## 2023-02-07 RX ADMIN — ASPIRIN 81 MG: 81 TABLET, CHEWABLE ORAL at 09:10

## 2023-02-07 RX ADMIN — ACETAMINOPHEN 650 MG: 325 TABLET ORAL at 02:35

## 2023-02-07 RX ADMIN — ACETAMINOPHEN 650 MG: 325 TABLET ORAL at 06:07

## 2023-02-07 RX ADMIN — METOPROLOL TARTRATE 12.5 MG: 25 TABLET, FILM COATED ORAL at 09:09

## 2023-02-07 RX ADMIN — DEXAMETHASONE 6 MG: 4 TABLET ORAL at 09:09

## 2023-02-07 RX ADMIN — CEFEPIME 2 G: 2 INJECTION, POWDER, FOR SOLUTION INTRAVENOUS at 09:14

## 2023-02-07 RX ADMIN — GUAIFENESIN 600 MG: 600 TABLET, EXTENDED RELEASE ORAL at 09:09

## 2023-02-07 RX ADMIN — ACETAMINOPHEN 650 MG: 325 TABLET ORAL at 10:24

## 2023-02-07 RX ADMIN — LOSARTAN POTASSIUM 50 MG: 50 TABLET, FILM COATED ORAL at 09:10

## 2023-02-07 RX ADMIN — FUROSEMIDE 20 MG: 20 TABLET ORAL at 09:10

## 2023-02-07 RX ADMIN — AZITHROMYCIN MONOHYDRATE 250 MG: 250 TABLET ORAL at 13:43

## 2023-02-07 RX ADMIN — SODIUM CHLORIDE, PRESERVATIVE FREE 10 ML: 5 INJECTION INTRAVENOUS at 06:11

## 2023-02-07 RX ADMIN — FENOFIBRATE 160 MG: 160 TABLET ORAL at 09:10

## 2023-02-07 RX ADMIN — ENOXAPARIN SODIUM 40 MG: 100 INJECTION SUBCUTANEOUS at 09:10

## 2023-02-07 NOTE — PROGRESS NOTES
JOSE:   RUR: 10%    Disposition:  Home with Home O2    Chart reviewed. CM received orders for home O2 earlier today. CM spoke with patient's son re home O2 and to confirm address. Referral sent via [place]. CM awaiting response.     Debbie Sows  [phone]

## 2023-02-07 NOTE — PROGRESS NOTES
Patient discharged home with oxygen;  patient's daughters at bedside and driving her home. Patient was given discharge instructions; follow up appointments reviewed; medications discussed. Patient is feeling confident about going home. She is happy to have the O2.   Marjan Greenfield

## 2023-02-07 NOTE — DISCHARGE SUMMARY
Discharge Summary       PATIENT ID: Nany De Leon  MRN: [id number]   YOB: 1933    DATE OF ADMISSION: 2/2/2023  7:15 PM    DATE OF DISCHARGE: 2/7/23   PRIMARY CARE PROVIDER: Patrick Shirley MD     ATTENDING PHYSICIAN: Kimberly Morrissey MD  DISCHARGING PROVIDER: Kimberly Morrissey MD    To contact this individual call [phone] and ask the  to page. If unavailable ask to be transferred the Adult Hospitalist Department. CONSULTATIONS: IP CONSULT TO HOSPITALIST  IP CONSULT TO CARDIOLOGY  IP CONSULT TO ONCOLOGY  IP CONSULT TO PULMONOLOGY    PROCEDURES/SURGERIES: * No surgery found *    ADMITTING [address] [place] [address] COURSE:   Nany De Leon is a 80 y.o. female who presents with fevers. 20-year-old  female with past medical history of CLL who follows with oncology and COVID diagnosed 1 month ago who was transferred here for the above. Patient reports that she has been having on and off fevers for about a week. She reports that these fevers are at night or in the morning and if ranged to a temperature max of 102 °F.  She reports she had COVID 1 month ago and was told by her primary doctor that if she had fevers and shortness of breath to go to an ER. She reports that she has been having increased cough along with her fevers, for about 3 weeks now. She denies any hemoptysis. She does report some productive cough since having had COVID. She and her family report that she has been started on doxycycline by her primary care provider for concern for pneumonia and possible UTI. She did have a CT yesterday which showed patchy airspace disease and bilateral pleural effusions. She reports increased shortness of breath with exertion but not at rest.  She denies any hypoxia, syncope, chest pain, palpitations. At the time of my exam, patient is laying comfortably in bed with family at bedside. No acute complaints at this time.      Patient was found to have elevated BNP at 1062 and, as mentioned before, CT showing scarring versus inflammatory versus infectious and pleural effusion. Likely atypical CAP with failed outpatient treatment  Previous COVID infection likely influencing  Patient was given cefepime we will continue on cefepime for now  Procalcitonin ordered  Continuous pulse ox  Monitor with labs  CT shows bilateral groundglass opacities as well  Consider sequelae of COVID     Bilateral pleural effusion, some concern for heart failure  Elevated BNP  Echocardiogram ordered  We will consider cardiology consult based on results  We will consider starting diuretics though patient at this time is not short of breath, not hypoxic, and not showing signs of overt fluid overload     CLL  Follows with [facility]  Recently saw her oncologist  Monitor with labs  CT shows worsening disease but her comparison is 2014  Continue patient's prednisone as ordered by her oncologist  Consider oncology consult for potentially worsening disease though I do not have a comparison that is within an acceptable timeframe     Previous COVID-19 infection  Patient reports that she has remained positive since her infection      Constipation  [facility] course  Patient was started on treatment for community-acquired pneumonia. Patient found to be COVID-positive again and had moments of hypoxia. Patient started on dexamethasone. Patient started on cefepime which she did not have an allergic reaction to. Azithromycin was added for atypical coverage. Patient family was very involved during care. Echocardiogram showed some diastolic dysfunction which could potentially be exacerbating the pulmonary edema and pneumonia. Patient was treated with diuretics and antibiotics. Patient continued to have overnight and early morning fevers up to 103 with tachycardia that would go into an SVT. Cardiology was on board and metoprolol was started.   Patient continued to have episodes of tachycardia related to what her temperature will go up. Pulmonology was brought on board for pneumonia. CTA showed no signs of PE but continued to showed pneumonia. Patient's family reported that they wanted to take her home to continue treatment with oral antibiotics as they did not want her deteriorating in the hospital and felt like treatment had stagnated. I explained that the pneumonia was not fully responding to treatment and due to her having CLL, this becomes more difficult to treat. Patient's family understood but still that due to her age and comorbidities they would prefer for her to be treated at home if she could be in her environment. They we will follow-up with her outpatient providers. Oncology saw the patient in the hospital as well and mentions that her CLL has been stable. Patient continues to have overnight fevers. Patient hemodynamically stable when having fevers. Discussed with family about penicillin allergy and her tolerance to cephalosporins, so prescribed cefdinir and azithromycin, along with Lasix and dexamethasone. Patient to continue treatment at home. Discussed thoroughly at length with family about the risks and their plan. Patient and patient's family agreed to bring patient back to ER or to another ER if she has any more signs of deterioration. DISCHARGE DIAGNOSES / PLAN:      Atypical community-acquired pneumonia with failed outpatient treatment. Cefdinir and azithromycin. Bilateral pleural effusions, small, related to new diastolic heart failure grade 1. Continue Lasix 40 mg p.o. daily for 5 days then cut down to 20 mg p.o. daily. Continue metoprolol. COVID-19. Continue dexamethasone to complete 10-day course. Atrial tachycardia. Continue metoprolol. CLL continue management with oncology as outpatient. Constipation. MiraLAX. Fevers. Tylenol as needed.        PENDING TEST RESULTS:   At the time of discharge the following test results are still pending: none    FOLLOW UP APPOINTMENTS:    Follow-up Information       Follow up With Specialties Details Why Oriana Elizabeth MD Internal Medicine Physician Call Call to schedue follow up in 1 week. [address]      Grace Patrick MD Hematology and Oncology, Hematology Physician, Oncology Call Call to schedule continued follow up. [facility] [id number]  [phone]      Tayla Bridges MD Pulmonary Disease Call Call to schedule follow up. [address] [facility]  [address]  [place] [address] [phone]               ADDITIONAL CARE RECOMMENDATIONS: none    DIET: Regular Diet    ACTIVITY: Activity as tolerated    WOUND CARE: none    EQUIPMENT needed: none      DISCHARGE MEDICATIONS:  Current Discharge Medication List        START taking these medications    Details   aspirin 81 mg chewable tablet Take 1 Tablet by mouth daily. Qty: 30 Tablet, Refills: 0  Start date: 2/8/2023      azithromycin (ZITHROMAX) 250 mg tablet Take 1 Tablet by mouth daily. Qty: 5 Tablet, Refills: 0  Start date: 2/7/2023      dexAMETHasone (DECADRON) 6 mg tablet Take 1 Tablet by mouth Daily (before breakfast). Qty: 6 Tablet, Refills: 0  Start date: 2/8/2023      furosemide (LASIX) 20 mg tablet Take 2 Tablets by mouth daily for 5 days, THEN 1 Tablet daily for 10 days. Qty: 20 Tablet, Refills: 0  Start date: 2/7/2023, End date: 2/22/2023      guaiFENesin ER (MUCINEX) 600 mg ER tablet Take 1 Tablet by mouth every twelve (12) hours. Qty: 30 Tablet, Refills: 0  Start date: 2/7/2023      metoprolol tartrate (LOPRESSOR) 25 mg tablet Take 1 Tablet by mouth every twelve (12) hours. Qty: 60 Tablet, Refills: 0  Start date: 2/8/2023      cefdinir (OMNICEF) 300 mg capsule Take 1 Capsule by mouth two (2) times a day. Qty: 20 Capsule, Refills: 0  Start date: 2/7/2023      potassium chloride (K-DUR, KLOR-CON M20) 20 mEq tablet Take 1 Tablet by mouth daily.   Qty: 15 Tablet, Refills: 0  Start date: 2/7/2023               NOTIFY YOUR PHYSICIAN FOR ANY OF THE FOLLOWING:   Fever over 101 degrees for 24 hours. Chest pain, shortness of breath, fever, chills, nausea, vomiting, diarrhea, change in mentation, falling, weakness, bleeding. Severe pain or pain not relieved by medications. Or, any other signs or symptoms that you may have questions about. DISPOSITION:  X  Home With:   OT  PT  HH  RN       Long term SNF/Inpatient Rehab    Independent/assisted living    Hospice    Other:       PATIENT CONDITION AT DISCHARGE:     Functional status    Poor    X Deconditioned     Independent      Cognition    X Lucid     Forgetful     Dementia      Catheters/lines (plus indication)    Rodriguez     PICC     PEG    X None      Code status     Full code    X DNR      PHYSICAL EXAMINATION AT DISCHARGE:  Constitutional:  No acute distress, cooperative, pleasant    ENT:  Oral mucosa moist, oropharynx benign. Resp: Bilateral crackles, improved  No rhonchi or wheezing. No respiratory distress. No tachypnea. CV:  Regular rhythm, normal rate, no murmurs, gallops, rubs    GI:  Soft, non distended, non tender. normoactive bowel sounds, no hepatosplenomegaly     Musculoskeletal:  No edema, warm, 2+ pulses throughout    Neurologic:  Moves all extremities.   AAOx3, CN II-XII reviewed     CHRONIC MEDICAL DIAGNOSES:  Problem List as of 2/7/2023 Never Reviewed            Codes Class Noted - Resolved    Elevated C-reactive protein (CRP) ICD-10-CM: R79.82  ICD-9-CM: 790.95  2/2/2023 - Present        CLL (chronic lymphocytic leukemia) ([facility] ICD-10-CM: C91.10  ICD-9-CM: 204.10  2/2/2023 - Present        Atypical pneumonia ICD-10-CM: J18.9  ICD-9-CM: 966  2/2/2023 - Present        Bilateral pleural effusion ICD-10-CM: J90  ICD-9-CM: 511.9  2/2/2023 - Present        Elevated brain natriuretic peptide (BNP) level ICD-10-CM: R79.89  ICD-9-CM: 790.99  2/2/2023 - Present        Failure of outpatient treatment ICD-10-CM: Z78.9  ICD-9-CM: V49.89  2/2/2023 - Present        Post-acute sequelae of COVID-19 ([facility]) ICD-10-CM: U09.9  ICD-9-CM: 139.8  2/2/2023 - Present           Greater than 30 minutes were spent with the patient on counseling and coordination of care    Signed:   Holly Vinson MD  2/7/2023  12:21 PM

## 2023-02-07 NOTE — DISCHARGE INSTRUCTIONS
Today at 18:00 take 12.5 mg of metoprolol (1/2 tablet) and then tomorrow start at 25 mg twice daily.

## 2023-02-07 NOTE — CONSULTS
Pulm   Discussed with attending who called me this am as I was at an  outside hospital.  Ct scan viewed. No pte but  there was increased asdz. Per attending the family  wanted to take her home despite concern over the chest ct findings . He relayed to them that I preferred radiographic stability prior to dc but they preferred to take her home. He instructed them to to bring her back if she declines. Will arrange par fu as outpt. Condition is concerning and we would have preferred she stay a little longer. I did not get a change to speak with them personally prior to dc.

## 2023-02-07 NOTE — PROGRESS NOTES
Bedside, Verbal, and Written shift change report given to Chung RN (oncoming nurse) by Orest Angelucci, RN (offgoing nurse). Report included the following information SBAR, Kardex, ED Summary, OR Summary, Procedure Summary, Intake/Output, MAR, Accordion, and Recent Results.

## 2023-02-07 NOTE — PROGRESS NOTES
Pulse oximetry assessment   88% at rest on room air (if 88% or less, skip next steps)  85% while ambulating on room air  96% at rest on 2LPM  92% while ambulating on 2LPM

## 2023-02-07 NOTE — PROGRESS NOTES
2000 Report received from Beka Hester, [facility]. Patient alert and oriented, resting in bed and no needs expressed. Call bell within reach and family in room. 5701 Bedside shift change report given to James Hawley by Enzo Ward RN. Report included the following information SBAR, Kardex, MAR, Accordion, and Recent Results and Cardiac Rhythm NSR    Problem: Falls - Risk of  Goal: *Absence of Falls  Description: Document Paddy Canas Fall Risk and appropriate interventions in the flowsheet.   Outcome: Progressing Towards Goal  Note: Fall Risk Interventions:  Mobility Interventions: Bed/chair exit alarm    History of Falls Interventions: Room close to nurse's station, Investigate reason for fall    Problem: Cardiac Output -  Decreased  Goal: *Vital signs within specified parameters  Outcome: Progressing Towards Goal     Problem: Pain  Goal: *Control of Pain  Outcome: Progressing Towards Goal     Problem: Neutropenic Fever: Day 1  Goal: Diagnostic Test/Procedures  Outcome: Progressing Towards Goal

## 2023-02-08 LAB
BACTERIA SPEC CULT: ABNORMAL
BACTERIA SPEC CULT: ABNORMAL
BACTERIA SPEC CULT: NORMAL
FLUID CULTURE, SPNG2: NORMAL
GRAM STN SPEC: ABNORMAL
L PNEUMO1 AG UR QL IA: NEGATIVE
ORGANISM ID, SPNG3: NORMAL
PLEASE NOTE, SPNG4: NORMAL
S PNEUM AG SPEC QL LA: NEGATIVE
SERVICE CMNT-IMP: ABNORMAL
SERVICE CMNT-IMP: NORMAL
SPECIMEN SOURCE: NORMAL
SPECIMEN SOURCE: NORMAL
SPECIMEN, SPNG1: NORMAL

## 2023-02-10 LAB
BACTERIA SPEC CULT: NORMAL
SERVICE CMNT-IMP: NORMAL

## 2023-02-16 NOTE — PROGRESS NOTES
Physician Progress Note      PATIENT:               Diony Holliday  CSN #:                  [id number]  :                       1933  ADMIT DATE:       2023 7:15 PM  [address] DATE:        2023 5:16 PM  RESPONDING  PROVIDER #:        Larissa Solorzano MD          QUERY TEXT:    Patient admitted with fevers. Patient with recent Covid infection and noted positive Covid test.  If possible, please document in progress notes and discharge summary if patient has an active Covid-19 infection or if patient is testing positive for Covid-19 without a current active infection: The medical record reflects the following:  Risk Factors: COVID    Clinical Indicators:  COVID diagnosed 1 month ago who was transferred here for the above. Patient reports that she has been having on and off fevers for about a week. She reports that these fevers are at night or in the morning and if ranged to a temperature max of 102 ? F.   She reports she had COVID 1 month ago and was told by her primary doctor that if she had fevers and shortness of breath to go to an ER      H&P  Post-acute sequelae of COVID-19  Likely atypical CAP with failed outpatient treatment  Previous COVID infection likely influencing    2/ SARS-CoV-2, PCR NOTD   Detected Abnormal    Treatment:  supplemental O2  Continuous pulse ox  Monitor with labs  cefepime    Thank you,  Christiano Ferreiraj 65  Clinical Documentation  [phone] or via [address] [facility] [address] provided:  -- Active Covid-19 infection is being treated and/or evaluated on current encounter  -- Positive Covid test result without an active current Covid-19 infection  -- Other - I will add my own diagnosis  -- Disagree - Not applicable / Not valid  -- Disagree - Clinically unable to determine / Unknown  -- Refer to Clinical Documentation Reviewer    PROVIDER RESPONSE TEXT:    Patient has an active Covid-19 infection that is being treated and/or evaluated on current encounter.     Query created by: Susan Nj on 2/13/2023 8:00 AM      Electronically signed by:  Hannah Calderón MD 2/16/2023 6:58 AM

## 2023-02-26 ENCOUNTER — APPOINTMENT (OUTPATIENT)
Dept: GENERAL RADIOLOGY | Age: 88
End: 2023-02-26
Attending: STUDENT IN AN ORGANIZED HEALTH CARE EDUCATION/TRAINING PROGRAM
Payer: MEDICARE

## 2023-02-26 ENCOUNTER — HOSPITAL ENCOUNTER (INPATIENT)
Age: 88
LOS: 6 days | Discharge: HOME HEALTH | End: 2023-03-04
Attending: STUDENT IN AN ORGANIZED HEALTH CARE EDUCATION/TRAINING PROGRAM | Admitting: INTERNAL MEDICINE
Payer: MEDICARE

## 2023-02-26 DIAGNOSIS — I50.9 ACUTE ON CHRONIC CONGESTIVE HEART FAILURE, UNSPECIFIED HEART FAILURE TYPE (HCC): ICD-10-CM

## 2023-02-26 DIAGNOSIS — J18.9 HOSPITAL-ACQUIRED PNEUMONIA: Primary | ICD-10-CM
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
## 2023-02-26 DIAGNOSIS — R09.02 HYPOXIA: ICD-10-CM

## 2023-02-26 LAB
ALBUMIN SERPL-MCNC: 3 G/DL (ref 3.5–5)
ALBUMIN/GLOB SERPL: 1.2 (ref 1.1–2.2)
ALP SERPL-CCNC: 77 U/L (ref 45–117)
ALT SERPL-CCNC: 20 U/L (ref 12–78)
ANION GAP SERPL CALC-SCNC: 7 MMOL/L (ref 5–15)
ARTERIAL PATENCY WRIST A: POSITIVE
AST SERPL-CCNC: 16 U/L (ref 15–37)
B PERT DNA SPEC QL NAA+PROBE: NOT DETECTED
BASE EXCESS BLD CALC-SCNC: 6.7 MMOL/L
BASOPHILS # BLD: 0 K/UL (ref 0–0.1)
BASOPHILS NFR BLD: 0 % (ref 0–1)
BDY SITE: ABNORMAL
BILIRUB SERPL-MCNC: 0.8 MG/DL (ref 0.2–1)
BNP SERPL-MCNC: 1645 PG/ML
BORDETELLA PARAPERTUSSIS PCR, BORPAR: NOT DETECTED
BUN SERPL-MCNC: 15 MG/DL (ref 6–20)
BUN/CREAT SERPL: 32 (ref 12–20)
C PNEUM DNA SPEC QL NAA+PROBE: NOT DETECTED
CALCIUM SERPL-MCNC: 9.4 MG/DL (ref 8.5–10.1)
CHLORIDE SERPL-SCNC: 96 MMOL/L (ref 97–108)
CO2 SERPL-SCNC: 29 MMOL/L (ref 21–32)
COMMENT, HOLDF: NORMAL
CREAT SERPL-MCNC: 0.47 MG/DL (ref 0.55–1.02)
DIFFERENTIAL METHOD BLD: ABNORMAL
EOSINOPHIL # BLD: 0 K/UL (ref 0–0.4)
EOSINOPHIL NFR BLD: 0 % (ref 0–7)
ERYTHROCYTE [DISTWIDTH] IN BLOOD BY AUTOMATED COUNT: 16.5 % (ref 11.5–14.5)
FLUAV H1 2009 PAND RNA SPEC QL NAA+PROBE: NOT DETECTED
FLUAV H1 RNA SPEC QL NAA+PROBE: NOT DETECTED
FLUAV H3 RNA SPEC QL NAA+PROBE: NOT DETECTED
FLUAV SUBTYP SPEC NAA+PROBE: NOT DETECTED
FLUBV RNA SPEC QL NAA+PROBE: NOT DETECTED
GLOBULIN SER CALC-MCNC: 2.5 G/DL (ref 2–4)
GLUCOSE SERPL-MCNC: 170 MG/DL (ref 65–100)
HADV DNA SPEC QL NAA+PROBE: NOT DETECTED
HCO3 BLD-SCNC: 30.2 MMOL/L (ref 22–26)
HCOV 229E RNA SPEC QL NAA+PROBE: NOT DETECTED
HCOV HKU1 RNA SPEC QL NAA+PROBE: NOT DETECTED
HCOV NL63 RNA SPEC QL NAA+PROBE: NOT DETECTED
HCOV OC43 RNA SPEC QL NAA+PROBE: NOT DETECTED
HCT VFR BLD AUTO: 28.9 % (ref 35–47)
HGB BLD-MCNC: 8.8 G/DL (ref 11.5–16)
HMPV RNA SPEC QL NAA+PROBE: NOT DETECTED
HPIV1 RNA SPEC QL NAA+PROBE: NOT DETECTED
HPIV2 RNA SPEC QL NAA+PROBE: NOT DETECTED
HPIV3 RNA SPEC QL NAA+PROBE: NOT DETECTED
HPIV4 RNA SPEC QL NAA+PROBE: NOT DETECTED
IMM GRANULOCYTES # BLD AUTO: 0 K/UL
IMM GRANULOCYTES NFR BLD AUTO: 0 %
LACTATE SERPL-SCNC: 1 MMOL/L (ref 0.4–2)
LYMPHOCYTES # BLD: 7.6 K/UL (ref 0.8–3.5)
LYMPHOCYTES NFR BLD: 42 % (ref 12–49)
M PNEUMO DNA SPEC QL NAA+PROBE: NOT DETECTED
MAGNESIUM SERPL-MCNC: 2.1 MG/DL (ref 1.6–2.4)
MCH RBC QN AUTO: 25.7 PG (ref 26–34)
MCHC RBC AUTO-ENTMCNC: 30.4 G/DL (ref 30–36.5)
MCV RBC AUTO: 84.3 FL (ref 80–99)
MONOCYTES # BLD: 0 K/UL (ref 0–1)
MONOCYTES NFR BLD: 0 % (ref 5–13)
NEUTS SEG # BLD: 10.6 K/UL (ref 1.8–8)
NEUTS SEG NFR BLD: 58 % (ref 32–75)
NRBC # BLD: 0 K/UL (ref 0–0.01)
NRBC BLD-RTO: 0 PER 100 WBC
PCO2 BLD: 38.1 MMHG (ref 35–45)
PH BLD: 7.51 (ref 7.35–7.45)
PLATELET # BLD AUTO: 332 K/UL (ref 150–400)
PMV BLD AUTO: 9 FL (ref 8.9–12.9)
PO2 BLD: 80 MMHG (ref 80–100)
POTASSIUM SERPL-SCNC: 4.1 MMOL/L (ref 3.5–5.1)
PROT SERPL-MCNC: 5.5 G/DL (ref 6.4–8.2)
RBC # BLD AUTO: 3.43 M/UL (ref 3.8–5.2)
RBC MORPH BLD: ABNORMAL
RSV RNA SPEC QL NAA+PROBE: NOT DETECTED
RV+EV RNA SPEC QL NAA+PROBE: NOT DETECTED
SAMPLES BEING HELD,HOLD: NORMAL
SAO2 % BLD: 96.8 % (ref 92–97)
SARS-COV-2 RNA RESP QL NAA+PROBE: DETECTED
SODIUM SERPL-SCNC: 132 MMOL/L (ref 136–145)
SPECIMEN TYPE: ABNORMAL
TROPONIN I SERPL HS-MCNC: 14 NG/L (ref 0–37)
WBC # BLD AUTO: 18.2 K/UL (ref 3.6–11)

## 2023-02-26 PROCEDURE — 96375 TX/PRO/DX INJ NEW DRUG ADDON: CPT

## 2023-02-26 PROCEDURE — 74011250637 HC RX REV CODE- 250/637: Performed by: INTERNAL MEDICINE

## 2023-02-26 PROCEDURE — 36415 COLL VENOUS BLD VENIPUNCTURE: CPT

## 2023-02-26 PROCEDURE — 80053 COMPREHEN METABOLIC PANEL: CPT

## 2023-02-26 PROCEDURE — 85025 COMPLETE CBC W/AUTO DIFF WBC: CPT

## 2023-02-26 PROCEDURE — 84484 ASSAY OF TROPONIN QUANT: CPT

## 2023-02-26 PROCEDURE — 65270000046 HC RM TELEMETRY

## 2023-02-26 PROCEDURE — 74011250636 HC RX REV CODE- 250/636: Performed by: INTERNAL MEDICINE

## 2023-02-26 PROCEDURE — 96374 THER/PROPH/DIAG INJ IV PUSH: CPT

## 2023-02-26 PROCEDURE — 74011250636 HC RX REV CODE- 250/636: Performed by: STUDENT IN AN ORGANIZED HEALTH CARE EDUCATION/TRAINING PROGRAM

## 2023-02-26 PROCEDURE — 74011000250 HC RX REV CODE- 250: Performed by: STUDENT IN AN ORGANIZED HEALTH CARE EDUCATION/TRAINING PROGRAM

## 2023-02-26 PROCEDURE — 94761 N-INVAS EAR/PLS OXIMETRY MLT: CPT

## 2023-02-26 PROCEDURE — 99285 EMERGENCY DEPT VISIT HI MDM: CPT

## 2023-02-26 PROCEDURE — 87040 BLOOD CULTURE FOR BACTERIA: CPT

## 2023-02-26 PROCEDURE — 82803 BLOOD GASES ANY COMBINATION: CPT

## 2023-02-26 PROCEDURE — 83880 ASSAY OF NATRIURETIC PEPTIDE: CPT

## 2023-02-26 PROCEDURE — 0202U NFCT DS 22 TRGT SARS-COV-2: CPT

## 2023-02-26 PROCEDURE — 71045 X-RAY EXAM CHEST 1 VIEW: CPT

## 2023-02-26 PROCEDURE — 74011000250 HC RX REV CODE- 250: Performed by: INTERNAL MEDICINE

## 2023-02-26 PROCEDURE — 83735 ASSAY OF MAGNESIUM: CPT

## 2023-02-26 PROCEDURE — 83605 ASSAY OF LACTIC ACID: CPT

## 2023-02-26 PROCEDURE — 36600 WITHDRAWAL OF ARTERIAL BLOOD: CPT

## 2023-02-26 RX ORDER — FUROSEMIDE 10 MG/ML
20 INJECTION INTRAMUSCULAR; INTRAVENOUS EVERY 12 HOURS
Status: DISCONTINUED | OUTPATIENT
Start: 2023-02-26 | End: 2023-03-01

## 2023-02-26 RX ORDER — POLYETHYLENE GLYCOL 3350 17 G/17G
17 POWDER, FOR SOLUTION ORAL
COMMUNITY

## 2023-02-26 RX ORDER — GUAIFENESIN 100 MG/5ML
81 LIQUID (ML) ORAL DAILY
Status: DISCONTINUED | OUTPATIENT
Start: 2023-02-27 | End: 2023-03-04 | Stop reason: HOSPADM

## 2023-02-26 RX ORDER — LEVOFLOXACIN 5 MG/ML
750 INJECTION, SOLUTION INTRAVENOUS ONCE
Status: DISCONTINUED | OUTPATIENT
Start: 2023-02-26 | End: 2023-02-26

## 2023-02-26 RX ORDER — FENOFIBRATE 160 MG/1
160 TABLET ORAL DAILY
COMMUNITY

## 2023-02-26 RX ORDER — METOPROLOL TARTRATE 25 MG/1
25 TABLET, FILM COATED ORAL EVERY 12 HOURS
Status: DISCONTINUED | OUTPATIENT
Start: 2023-02-26 | End: 2023-03-04 | Stop reason: HOSPADM

## 2023-02-26 RX ORDER — POLYETHYLENE GLYCOL 3350 17 G/17G
17 POWDER, FOR SOLUTION ORAL
Status: DISCONTINUED | OUTPATIENT
Start: 2023-02-26 | End: 2023-03-04 | Stop reason: HOSPADM

## 2023-02-26 RX ORDER — SODIUM CHLORIDE 0.9 % (FLUSH) 0.9 %
5-40 SYRINGE (ML) INJECTION EVERY 8 HOURS
Status: DISCONTINUED | OUTPATIENT
Start: 2023-02-26 | End: 2023-03-04 | Stop reason: HOSPADM

## 2023-02-26 RX ORDER — SODIUM CHLORIDE 0.9 % (FLUSH) 0.9 %
5-40 SYRINGE (ML) INJECTION AS NEEDED
Status: DISCONTINUED | OUTPATIENT
Start: 2023-02-26 | End: 2023-03-04 | Stop reason: HOSPADM

## 2023-02-26 RX ORDER — ACETAMINOPHEN 325 MG/1
650 TABLET ORAL
Status: DISCONTINUED | OUTPATIENT
Start: 2023-02-26 | End: 2023-03-04 | Stop reason: HOSPADM

## 2023-02-26 RX ORDER — ASCORBIC ACID 500 MG
500 TABLET ORAL DAILY
COMMUNITY

## 2023-02-26 RX ORDER — HYDROGEN PEROXIDE 3 %
20 SOLUTION, NON-ORAL MISCELLANEOUS DAILY
COMMUNITY

## 2023-02-26 RX ORDER — GUAIFENESIN 600 MG/1
600 TABLET, EXTENDED RELEASE ORAL EVERY 12 HOURS
Status: DISCONTINUED | OUTPATIENT
Start: 2023-02-26 | End: 2023-03-04 | Stop reason: HOSPADM

## 2023-02-26 RX ORDER — GLUCOSAMINE SULFATE 1500 MG
1000 POWDER IN PACKET (EA) ORAL DAILY
COMMUNITY

## 2023-02-26 RX ORDER — FUROSEMIDE 10 MG/ML
40 INJECTION INTRAMUSCULAR; INTRAVENOUS ONCE
Status: COMPLETED | OUTPATIENT
Start: 2023-02-26 | End: 2023-02-26

## 2023-02-26 RX ADMIN — CEFEPIME 2 G: 2 INJECTION, POWDER, FOR SOLUTION INTRAVENOUS at 15:47

## 2023-02-26 RX ADMIN — GUAIFENESIN 600 MG: 600 TABLET, EXTENDED RELEASE ORAL at 21:37

## 2023-02-26 RX ADMIN — AZITHROMYCIN MONOHYDRATE 500 MG: 500 INJECTION, POWDER, LYOPHILIZED, FOR SOLUTION INTRAVENOUS at 21:36

## 2023-02-26 RX ADMIN — ACETAMINOPHEN 650 MG: 325 TABLET ORAL at 21:37

## 2023-02-26 RX ADMIN — FUROSEMIDE 20 MG: 10 INJECTION, SOLUTION INTRAMUSCULAR; INTRAVENOUS at 21:36

## 2023-02-26 RX ADMIN — Medication 10 ML: at 22:00

## 2023-02-26 RX ADMIN — FUROSEMIDE 20 MG: 10 INJECTION, SOLUTION INTRAMUSCULAR; INTRAVENOUS at 13:52

## 2023-02-26 RX ADMIN — ALUMINUM HYDROXIDE AND MAGNESIUM HYDROXIDE 30 ML: 200; 200 SUSPENSION ORAL at 23:47

## 2023-02-26 RX ADMIN — METOPROLOL TARTRATE 25 MG: 25 TABLET, FILM COATED ORAL at 21:37

## 2023-02-26 NOTE — ED NOTES
Has a final transfer review been performed?  Yes    Reason for Admission: pna, IV diuresis  Patient comes from: home  Mental Status: Alert and oriented  ADL:total assistance  Ambulation:ambulates with: walker  Pertinent Info/Safety Concerns: Pt getting IV abx, RVP in process  COVID Status: Comments: RVP in process  MEWS Score: 2  Sinus Rhythm  Vitals:    02/26/23 1630 02/26/23 1700 02/26/23 1730 02/26/23 1800   BP: (!) 119/48 (!) 110/48 (!) 125/56 102/60   Pulse: 70 70 72 70   Resp: 21 23 25 23   Temp:    97.9 °F (36.6 °C)   SpO2: 95% 94% 92% 92%   Weight:       Height:         Lines:   Peripheral IV 02/26/23 Left Antecubital (Active)      Transport mode:ED stretcher    Live Murray  being transferred to [facility](unit) for routine progression of care     \"Notification of etransfer note given to (Name) Catalina (Title) sec

## 2023-02-26 NOTE — H&P
[address] [facility] Adult  Hospitalist Group  History and Physical    Date of Service:  2/26/2023  Primary Care Provider: Harish Pino MD  Source of information: The patient, Chart review, and Spouse/family member    Chief Complaint: Shortness of Breath      History of Presenting Illness:   80 y.o. female with history of CLL status post chemo, history of COVID infection January-February 2023, chronic HFpEF was brought in by EMS with shortness of breath, cough/chest congestion, requiring increasing oxygen support. Patient had COVID infection January 2023 treated with Paxlovid. She was admitted in February 2023 with COVID and discharged with dexamethasone and supplemental O2 with 2 L nasal cannula. Since discharge she has been increasingly more short of breath requiring increased supplemental oxygen use. She had a cardioversion done by her cardiologist at [facility] on 2/24 and is now using up to 4 L oxygen at home. Patient had a mild fever of 100.2 earlier today but denies any nausea vomiting chills diarrhea constipation dysuria chest pain recent falls injuries syncope abdominal pains. Daughter also reports that patient's feet and legs have been very swollen. Per EMS, patient was saturating 81% on 4 LNC at home which improved to 94% on 6L on arrival.  CXR in the ED showed worsening bilateral infiltrates and bilateral pleural effusions. The ED she received cefepime 2 g IV x1. EDMD wanted to add levofloxacin 750 mg IV x1 but patient's family refused due to patient's history of tendon rupture. REVIEW OF SYSTEMS:  A comprehensive review of systems was negative except for that written in the History of Present Illness.      Past Medical History:   Diagnosis Date    CLL (chronic lymphocytic leukemia) ([facility]     History of chemotherapy     For chronic lymphacytic leukemia    Menopause 1986      Past Surgical History:   Procedure Laterality Date    HX BREAST BIOPSY Bilateral surgical-benign    HX HYSTERECTOMY  1986    HX OOPHORECTOMY Bilateral 1986     Prior to Admission medications    Medication Sig Start Date End Date Taking? Authorizing Provider   aspirin 81 mg chewable tablet Take 1 Tablet by mouth daily. 2/8/23   Zena Costa MD   guaiFENesin ER (MUCINEX) 600 mg ER tablet Take 1 Tablet by mouth every twelve (12) hours. 2/7/23   Zena Costa MD   metoprolol tartrate (LOPRESSOR) 25 mg tablet Take 1 Tablet by mouth every twelve (12) hours. 2/8/23   Zena Costa MD   potassium chloride (K-DUR, KLOR-CON M20) 20 mEq tablet Take 1 Tablet by mouth daily. 2/7/23   Zena Costa MD     Allergies   Allergen Reactions    Penicillins Anaphylaxis    Ciprofloxacin Myalgia      No family history on file. Social History:  reports that she has never smoked. She has never used smokeless tobacco.   Social Determinants of Health     Tobacco Use: Low Risk     Smoking Tobacco Use: Never    Smokeless Tobacco Use: Never    Passive Exposure: Not on file   Alcohol Use: Not on file   Financial Resource Strain: Not on file   Food Insecurity: Not on file   Transportation Needs: Not on file   Physical Activity: Not on file   Stress: Not on file   Social Connections: Not on file   Intimate Partner Violence: Not on file   Depression: Not on file   Housing Stability: Not on file        Medications were reconciled to the best of my ability given all available resources at the time of admission. Route is PO if not otherwise noted. Family and social history were personally reviewed, all pertinent and relevant details are outlined as above.     Objective:   Visit Vitals  BP (!) 119/48   Pulse 70   Temp 98.3 °F (36.8 °C)   Resp 21   Ht 4' 11\" (1.499 m)   Wt 54.3 kg (119 lb 11.4 oz)   SpO2 95%   BMI 24.18 kg/m²    O2 Flow Rate (L/min): 6 l/min O2 Device: Nasal cannula    PHYSICAL EXAM:   General: awake, NAD, pleasant  HEENT: NCAT, PERRL, EOMI, MMM, OP benign, NC in place  Neck: supple, no masses  Lungs: diffuse bilateral rhonchi, no wheeze  CVS: regular rhythm, normal rate, no m/r/g appreciated, BLE edema, +pulses  GI: +BS, soft, NT, ND   MSK: JIGAR  Neuro/Psych: AOx3, CN II-XII grossly intact, responds appropriately to questions and commands, pleasant mood and affect  Skin: Warm, dry, no rashes or lesions noted    Data Review:   I have independently reviewed and interpreted patient's lab and all other diagnostic data    Abnormal Labs Reviewed   CBC WITH AUTOMATED DIFF - Abnormal; Notable for the following components:       Result Value    WBC 18.2 (*)     RBC 3.43 (*)     HGB 8.8 (*)     HCT 28.9 (*)     MCH 25.7 (*)     RDW 16.5 (*)     MONOCYTES 0 (*)     ABS. NEUTROPHILS 10.6 (*)     ABS. LYMPHOCYTES 7.6 (*)     All other components within normal limits   METABOLIC PANEL, COMPREHENSIVE - Abnormal; Notable for the following components:    Sodium 132 (*)     Chloride 96 (*)     Glucose 170 (*)     Creatinine 0.47 (*)     BUN/Creatinine ratio 32 (*)     Protein, total 5.5 (*)     Albumin 3.0 (*)     All other components within normal limits   NT-PRO BNP - Abnormal; Notable for the following components:    NT pro-BNP 1,645 (*)     All other components within normal limits       All Micro Results       Procedure Component Value Units Date/Time    RESPIRATORY VIRUS PANEL W/COVID-19, PCR [[id number]] Collected: 02/26/23 1750    Order Status: Sent Specimen: NASOPHARYNGEAL SWAB Updated: 02/26/23 1756    CULTURE, RESPIRATORY/SPUTUM/BRONCH Brenton Battles STAIN [[id number]]     Order Status: Sent Specimen: Sputum     LEGIONELLA AG, URINE [[id number]]     Order Status: Sent Specimen: Urine     LEGIONELLA PNEUMOPHILA AG, URINE [[id number]]     Order Status: Sent Specimen: Urine     QUINN Holm, UR/CSF [[id number]]     Order Status: Sent     CULTURE, BLOOD, PAIRED [[id number]] Collected: 02/26/23 1540    Order Status: Sent Specimen: Blood Updated: 02/26/23 1544            IMAGING:   XR CHEST PORT Final Result   1. Worsening airspace disease and small bilateral pleural effusions           ECG/ECHO:    Results for orders placed or performed during the hospital encounter of 02/02/23   EKG, 12 LEAD, INITIAL   Result Value Ref Range    Ventricular Rate 152 BPM    Atrial Rate 170 BPM    QRS Duration 76 ms    Q-T Interval 244 ms    QTC Calculation (Bezet) 387 ms    Calculated R Axis 22 degrees    Calculated T Axis -89 degrees    Diagnosis       Atrial fibrillation  Inferior infarct , age undetermined  When compared with ECG of 02-FEB-2023 20:32,  Atrial fibrillation has replaced Sinus rhythm  Vent. rate has increased BY  69 BPM  Criteria for Septal infarct are no longer present  Inferior infarct is now present  ST no longer depressed in Inferior leads  Nonspecific T wave abnormality now evident in Lateral leads  Confirmed by Randell Mccurdy M.D., Mary Cheatham ([id number]) on 2/6/2023 1:43:07 PM            Notes reviewed from all clinical/nonclinical/nursing services involved in patient's clinical care. Care coordination discussions were held with appropriate clinical/nonclinical/ nursing providers based on care coordination needs. Assessment/Plan:   Given the patient's current clinical presentation, there is a high level of concern for decompensation if discharged from the emergency department. Complex decision making was performed, which includes reviewing the patient's available past medical records, laboratory results, and imaging studies.     Hospital-acquired pneumonia (POA)  Patient was hospitalized February 2023 for COVID 19 pneumonia  Suspect secondary bacterial infection  Check respiratory viral panel, urine strep/Legionella antigens  Continue cefepime started in the ED today x7 days  Add azithromycin x5 days  Consider CT chest if no improvement    Acute on chronic hypoxemic respiratory failure  Due to above  Continue supplemental oxygen and wean as tolerated  Continue pulmonary toilet with guaifenesin, out of bed, IS    Acute on chronic HFpEF  With elevated BNP above previous  Start fentle IV diuresis and hold home diuretics for now  Check TTE    H/o CLL    H/o COVID infection Jan-Feb 2023    DIET: ADULT DIET Regular   ISOLATION PRECAUTIONS: Airborne and Contact, Droplet Plus  CODE STATUS: DNR   DVT PROPHYLAXIS: SCDs  FUNCTIONAL STATUS PRIOR TO HOSPITALIZATION: Capable of only limited self-care; confined to bed or chair likely more than 50% of waking hours. Ambulatory status/function: Ambulates with assistance:  Walker   EARLY MOBILITY ASSESSMENT: Recommend an assessment from physical therapy and/or occupational therapy  ANTICIPATED DISCHARGE: Greater than 48 hours.   ANTICIPATED DISPOSITION: Home with Home Healthcare  EMERGENCY CONTACT/SURROGATE DECISION MAKER: daughter      Signed By: Jewell Christensen MD     February 26, 2023

## 2023-02-26 NOTE — ED PROVIDER NOTES
Patient is a 27-year-old female presented to the ED with worsening shortness of breath. Patient's hypoxia has worsened and she is now requiring 6 L. Patient recently was admitted for COVID-pneumonia. She had a cardioversion on Friday. She was doing well prior to cardioversion with her and sounds rather but now she is in significant respiratory distress. The history is provided by the patient, medical records and a relative. Shortness of Breath  Pertinent negatives include no chest pain and no abdominal pain. Past Medical History:   Diagnosis Date    CLL (chronic lymphocytic leukemia) ([facility] [address]     History of chemotherapy     For chronic lymphacytic leukemia    Menopause 1986       Past Surgical History:   Procedure Laterality Date    HX BREAST BIOPSY Bilateral     surgical-benign    HX HYSTERECTOMY  1986    HX OOPHORECTOMY Bilateral 1986         No family history on file. Social History     Socioeconomic History    Marital status:      Spouse name: Not on file    Number of children: Not on file    Years of education: Not on file    Highest education level: Not on file   Occupational History    Not on file   Tobacco Use    Smoking status: Never    Smokeless tobacco: Never   Vaping Use    Vaping Use: Not on file   Substance and Sexual Activity    Alcohol use: Not on file    Drug use: Not on file    Sexual activity: Not on file   Other Topics Concern    Not on file   Social History Narrative    Not on file     Social Determinants of Health     Financial Resource Strain: Not on file   Food Insecurity: Not on file   Transportation Needs: Not on file   Physical Activity: Not on file   Stress: Not on file   Social Connections: Not on file   Intimate Partner Violence: Not on file   Housing Stability: Not on file         ALLERGIES: Penicillins and Ciprofloxacin    Review of Systems   Constitutional:  Positive for activity change, appetite change and fatigue.    Respiratory:  Positive for shortness of breath. Cardiovascular:  Negative for chest pain. Gastrointestinal:  Negative for abdominal pain. Vitals:    02/26/23 1530 02/26/23 1548 02/26/23 1600 02/26/23 1630   BP: (!) 104/45  (!) 117/51 (!) 119/48   Pulse: 73  72 70   Resp: 29  26 21   Temp:       SpO2: 94%  92% 95%   Weight:  54.3 kg (119 lb 11.4 oz)     Height:  4' 11\" (1.499 m)              Physical Exam  Vitals and nursing note reviewed. Constitutional:       Appearance: Normal appearance. She is ill-appearing. HENT:      Head: Normocephalic and atraumatic. Right Ear: External ear normal.      Left Ear: External ear normal.   Eyes:      Conjunctiva/sclera: Conjunctivae normal.   Cardiovascular:      Rate and Rhythm: Normal rate and regular rhythm. Pulses: Normal pulses. Heart sounds: Normal heart sounds. Pulmonary:      Effort: Pulmonary effort is normal.      Breath sounds: Examination of the right-upper field reveals rhonchi. Examination of the right-middle field reveals rhonchi. Examination of the right-lower field reveals rhonchi. Rhonchi and rales present. Chest:      Chest wall: No deformity or tenderness. Abdominal:      Palpations: Abdomen is soft. Tenderness: There is no abdominal tenderness. Musculoskeletal:         General: No deformity or signs of injury. Normal range of motion. Skin:     General: Skin is warm and dry. Coloration: Skin is not jaundiced. Neurological:      General: No focal deficit present. Mental Status: She is alert and oriented to person, place, and time. Psychiatric:         Mood and Affect: Mood normal.         Behavior: Behavior normal.        Medical Decision Making  Amount and/or Complexity of Data Reviewed  Independent Historian: caregiver and spouse  Labs: ordered. Decision-making details documented in ED Course. Radiology: ordered. ECG/medicine tests: ordered and independent interpretation performed.  Decision-making details documented in ED Course. Discussion of management or test interpretation with external provider(s): Discussed antibiotic treatment with pharmacy. Discussed with hospitalist.    Risk  Prescription drug management. Decision regarding hospitalization. ED Course as of 02/26/23 1648   Sun Feb 26, 2023   1319 EKG interpretation:   Rhythm: normal sinus rhythm; and regular . Rate (approx.): 79; Axis: normal; Intervals: normal ; ST/T wave: normal; EKG documented and interpreted by Neda Álvarez. Cleo Linn MD, Emergency Medicine.     [AL]   1354 WBC(!): 18.2 [AL]   1433 Troponin-High Sensitivity: 14 [AL]   2572 NT pro-BNP(!): 1,645 [AL]      ED Course User Index  [AL] Erik Monterroso MD     LABORATORY RESULTS:  Labs Reviewed   CBC WITH AUTOMATED DIFF - Abnormal; Notable for the following components:       Result Value    WBC 18.2 (*)     RBC 3.43 (*)     HGB 8.8 (*)     HCT 28.9 (*)     MCH 25.7 (*)     RDW 16.5 (*)     MONOCYTES 0 (*)     ABS. NEUTROPHILS 10.6 (*)     ABS. LYMPHOCYTES 7.6 (*)     All other components within normal limits   METABOLIC PANEL, COMPREHENSIVE - Abnormal; Notable for the following components:    Sodium 132 (*)     Chloride 96 (*)     Glucose 170 (*)     Creatinine 0.47 (*)     BUN/Creatinine ratio 32 (*)     Protein, total 5.5 (*)     Albumin 3.0 (*)     All other components within normal limits   NT-PRO BNP - Abnormal; Notable for the following components:    NT pro-BNP 1,645 (*)     All other components within normal limits   CULTURE, BLOOD, PAIRED   MAGNESIUM   TROPONIN-HIGH SENSITIVITY   SAMPLES BEING HELD   LACTIC ACID       IMAGING RESULTS:  XR CHEST PORT   Final Result   1.  Worsening airspace disease and small bilateral pleural effusions          MEDICATIONS GIVEN:  Medications   furosemide (LASIX) injection 40 mg (20 mg IntraVENous Given 2/26/23 1352)   cefepime (MAXIPIME) 2 g in 0.9% sodium chloride 10 mL IV syringe (2 g IntraVENous Given 2/26/23 6707)       Differential diagnosis: CHF exacerbation, long-haul COVID, pneumonia, hypoxia    ED physician interpretation of EKG: No STEMI. See my interpretation EKG in ED course above. ED physician interpretation of laboratory results: Lab work without signs of ACS. BNP elevated at 1600 concerning for worsening CHF. Leukocytosis of 18.2 concerning for worsening systemic infection. no lactic acidosis. MDM: Patient is a 80-year-old female presented ED with worsening shortness of breath and oxygen requirement likely multifactorial nature due to CHF and pneumonia as well as recent COVID. Patient given 20 mg of follow-up furosemide with large volume urine output with improvement in hypoxia. However patient's chest x-ray has a left upper lobe infiltrate concerning for worsening pneumonia. Based on patient's recent antibiotics and hospitalization hospital-acquired pneumonia is certainly an issue. Cefepime started. Discussed patient with pharmacy and patient's myalgias would allow Levaquin use. However in further discussion with patient and family she actually had several tendon ruptures with Levaquin. We will hold at this time for further risk benefit analysis or other antibiotic options. Family and patient comfortable with discharge at this time. Patient's oxygen requirement was high on arrival 6 or 7 L nasal cannula this is able to be titrated down to 4 L. Hospital service contacted and patient admitted. DISPOSITION: Admitted    [facility] for Admission  4:47 PM    ED Room Number: ER16/16  Patient Name and age:  Rock Henley 80 y.o.  female  Working Diagnosis:   1. Hospital-acquired pneumonia    2. Acute on chronic congestive heart failure, unspecified heart failure type ([address]    3.  Hypoxia        COVID-19 Suspicion:  yes, recent admission for COVID pneumonia  Sepsis present:  no  Reassessment needed: yes  Code Status:  Full Code  Readmission: yes  Isolation Requirements:  no  Recommended Level of Care:  telemetry  Department: [facility] Adult ED - [phone]    Other: Patient with complex recent history of COVID-pneumonia. Presented to the ED with shortness of breath. Hypoxia requiring up to 6 L now on 4 L after diuresis. Patient has worsening right upper lobe pneumonia. Possibly hospital-acquired. Patient started on cefepime. Discussed Levaquin but family hesitant due to tendon rupture history. Aletha Cortez.  Hudson Viera MD      Procedures

## 2023-02-26 NOTE — PROGRESS NOTES
Day #1 of cefepime  Indication:  HAP  Current regimen:  cefepime 2 g IV Q24h  Abx regimen: cefepime + azithromycin  Recent Labs     23  1321   WBC 18.2*   CREA 0.47*   BUN 15     Est CrCl: 41.2 ml/min  Temp (24hrs), Av.1 °F (36.7 °C), Min:97.9 °F (36.6 °C), Max:98.3 °F (36.8 °C)    Cultures:   Blood cx 23: in process    Plan: Change to 2 g IV Q12h per [facility] approved protocol.     Thank you,   Rhonda Mac, PHARMD

## 2023-02-26 NOTE — ED TRIAGE NOTES
Pt arrives from home via EMS for increased shortness of breath.  Per EMS, was 81% on 4L NC. 94% on 6L on arrival.

## 2023-02-27 LAB
ANION GAP SERPL CALC-SCNC: 5 MMOL/L (ref 5–15)
BASOPHILS # BLD: 0 K/UL (ref 0–0.1)
BASOPHILS NFR BLD: 0 % (ref 0–1)
BUN SERPL-MCNC: 14 MG/DL (ref 6–20)
BUN/CREAT SERPL: 33 (ref 12–20)
CALCIUM SERPL-MCNC: 9.4 MG/DL (ref 8.5–10.1)
CHLORIDE SERPL-SCNC: 98 MMOL/L (ref 97–108)
CO2 SERPL-SCNC: 31 MMOL/L (ref 21–32)
CREAT SERPL-MCNC: 0.42 MG/DL (ref 0.55–1.02)
DIFFERENTIAL METHOD BLD: ABNORMAL
EOSINOPHIL # BLD: 0 K/UL (ref 0–0.4)
EOSINOPHIL NFR BLD: 0 % (ref 0–7)
ERYTHROCYTE [DISTWIDTH] IN BLOOD BY AUTOMATED COUNT: 16.4 % (ref 11.5–14.5)
GLUCOSE SERPL-MCNC: 92 MG/DL (ref 65–100)
HCT VFR BLD AUTO: 26.9 % (ref 35–47)
HGB BLD-MCNC: 8.5 G/DL (ref 11.5–16)
IMM GRANULOCYTES # BLD AUTO: 0 K/UL
IMM GRANULOCYTES NFR BLD AUTO: 0 %
LYMPHOCYTES # BLD: 6.3 K/UL (ref 0.8–3.5)
LYMPHOCYTES NFR BLD: 47 % (ref 12–49)
MAGNESIUM SERPL-MCNC: 2.2 MG/DL (ref 1.6–2.4)
MCH RBC QN AUTO: 26.5 PG (ref 26–34)
MCHC RBC AUTO-ENTMCNC: 31.6 G/DL (ref 30–36.5)
MCV RBC AUTO: 83.8 FL (ref 80–99)
MONOCYTES # BLD: 0.1 K/UL (ref 0–1)
MONOCYTES NFR BLD: 1 % (ref 5–13)
NEUTS SEG # BLD: 7 K/UL (ref 1.8–8)
NEUTS SEG NFR BLD: 52 % (ref 32–75)
NRBC # BLD: 0 K/UL (ref 0–0.01)
NRBC BLD-RTO: 0 PER 100 WBC
PLATELET # BLD AUTO: 292 K/UL (ref 150–400)
PMV BLD AUTO: 8.8 FL (ref 8.9–12.9)
POTASSIUM SERPL-SCNC: 3.3 MMOL/L (ref 3.5–5.1)
RBC # BLD AUTO: 3.21 M/UL (ref 3.8–5.2)
RBC MORPH BLD: ABNORMAL
SODIUM SERPL-SCNC: 134 MMOL/L (ref 136–145)
WBC # BLD AUTO: 13.4 K/UL (ref 3.6–11)

## 2023-02-27 PROCEDURE — 74011250636 HC RX REV CODE- 250/636: Performed by: PHYSICIAN ASSISTANT

## 2023-02-27 PROCEDURE — 74011250636 HC RX REV CODE- 250/636: Performed by: INTERNAL MEDICINE

## 2023-02-27 PROCEDURE — 36415 COLL VENOUS BLD VENIPUNCTURE: CPT

## 2023-02-27 PROCEDURE — 74011250637 HC RX REV CODE- 250/637: Performed by: INTERNAL MEDICINE

## 2023-02-27 PROCEDURE — 80048 BASIC METABOLIC PNL TOTAL CA: CPT

## 2023-02-27 PROCEDURE — 74011000258 HC RX REV CODE- 258: Performed by: INTERNAL MEDICINE

## 2023-02-27 PROCEDURE — 97116 GAIT TRAINING THERAPY: CPT

## 2023-02-27 PROCEDURE — 97161 PT EVAL LOW COMPLEX 20 MIN: CPT

## 2023-02-27 PROCEDURE — 85025 COMPLETE CBC W/AUTO DIFF WBC: CPT

## 2023-02-27 PROCEDURE — 65270000046 HC RM TELEMETRY

## 2023-02-27 PROCEDURE — 83735 ASSAY OF MAGNESIUM: CPT

## 2023-02-27 PROCEDURE — 74011250637 HC RX REV CODE- 250/637: Performed by: FAMILY MEDICINE

## 2023-02-27 PROCEDURE — 97535 SELF CARE MNGMENT TRAINING: CPT

## 2023-02-27 PROCEDURE — 74011250637 HC RX REV CODE- 250/637

## 2023-02-27 PROCEDURE — 97165 OT EVAL LOW COMPLEX 30 MIN: CPT

## 2023-02-27 PROCEDURE — 74011000250 HC RX REV CODE- 250: Performed by: INTERNAL MEDICINE

## 2023-02-27 RX ORDER — DEXAMETHASONE 4 MG/1
4 TABLET ORAL DAILY
Status: DISCONTINUED | OUTPATIENT
Start: 2023-02-27 | End: 2023-03-04 | Stop reason: HOSPADM

## 2023-02-27 RX ORDER — POTASSIUM CHLORIDE 750 MG/1
30 TABLET, FILM COATED, EXTENDED RELEASE ORAL
Status: COMPLETED | OUTPATIENT
Start: 2023-02-27 | End: 2023-02-27

## 2023-02-27 RX ORDER — ACETAMINOPHEN 325 MG/1
650 TABLET ORAL 4 TIMES DAILY
Status: DISCONTINUED | OUTPATIENT
Start: 2023-02-27 | End: 2023-03-04 | Stop reason: HOSPADM

## 2023-02-27 RX ADMIN — ACETAMINOPHEN 650 MG: 325 TABLET ORAL at 11:15

## 2023-02-27 RX ADMIN — Medication 10 ML: at 06:00

## 2023-02-27 RX ADMIN — FUROSEMIDE 20 MG: 10 INJECTION, SOLUTION INTRAMUSCULAR; INTRAVENOUS at 12:06

## 2023-02-27 RX ADMIN — GUAIFENESIN 600 MG: 600 TABLET, EXTENDED RELEASE ORAL at 11:15

## 2023-02-27 RX ADMIN — METOPROLOL TARTRATE 25 MG: 25 TABLET, FILM COATED ORAL at 11:15

## 2023-02-27 RX ADMIN — METOPROLOL TARTRATE 25 MG: 25 TABLET, FILM COATED ORAL at 22:08

## 2023-02-27 RX ADMIN — APIXABAN 2.5 MG: 2.5 TABLET, FILM COATED ORAL at 11:15

## 2023-02-27 RX ADMIN — CEFEPIME 2 G: 2 INJECTION, POWDER, FOR SOLUTION INTRAVENOUS at 03:15

## 2023-02-27 RX ADMIN — AZITHROMYCIN MONOHYDRATE 500 MG: 500 INJECTION, POWDER, LYOPHILIZED, FOR SOLUTION INTRAVENOUS at 19:33

## 2023-02-27 RX ADMIN — Medication 10 ML: at 22:08

## 2023-02-27 RX ADMIN — DEXAMETHASONE 4 MG: 4 TABLET ORAL at 12:06

## 2023-02-27 RX ADMIN — FUROSEMIDE 20 MG: 10 INJECTION, SOLUTION INTRAMUSCULAR; INTRAVENOUS at 22:07

## 2023-02-27 RX ADMIN — CEFEPIME 2 G: 2 INJECTION, POWDER, FOR SOLUTION INTRAVENOUS at 16:20

## 2023-02-27 RX ADMIN — ASPIRIN 81 MG CHEWABLE TABLET 81 MG: 81 TABLET CHEWABLE at 11:15

## 2023-02-27 RX ADMIN — GUAIFENESIN 600 MG: 600 TABLET, EXTENDED RELEASE ORAL at 22:08

## 2023-02-27 RX ADMIN — APIXABAN 2.5 MG: 2.5 TABLET, FILM COATED ORAL at 19:32

## 2023-02-27 RX ADMIN — POTASSIUM CHLORIDE 30 MEQ: 750 TABLET, FILM COATED, EXTENDED RELEASE ORAL at 06:32

## 2023-02-27 RX ADMIN — ACETAMINOPHEN 650 MG: 325 TABLET ORAL at 22:08

## 2023-02-27 RX ADMIN — Medication 10 ML: at 16:21

## 2023-02-27 RX ADMIN — ACETAMINOPHEN 650 MG: 325 TABLET ORAL at 03:59

## 2023-02-27 NOTE — DISCHARGE INSTRUCTIONS
You completed the antibiotic course for pneumonia while here. You are being discharged on a low-dose of furosemide, this may need to be adjusted on follow-up (e.g., changed to every other day if it's too much). Complete the course of dexamethasone as prescribed. The lung doctor's office should arrange follow-up, if you don't hear from them early next week, call them to request an appointment within 10 days.

## 2023-02-27 NOTE — PROGRESS NOTES
[address] visit attempted. Mrs. Surekha Simmons is [address]. Due to medical restrictions, she cannot receive communion. Prayer for spiritual communion offered outside her room.     CASTRO Powell, RN, ACSW, LCSW   Page:  [phone]

## 2023-02-27 NOTE — PROGRESS NOTES
Problem: Self Care Deficits Care Plan (Adult)  Goal: *Acute Goals and Plan of Care (Insert Text)  Description:   FUNCTIONAL STATUS PRIOR TO ADMISSION: Patient lives in 1st floor of home with 24/7 supervision. Was independent in basic ADL dressing/grooming. Requires assist for bathing for safety (children assist). Daughters/sons assist with all IADL. Was on RA ~1 month ago, however as of 2 weeks ago was using 2L to maintain SpO2. HOME SUPPORT: Lived with son/daughter in law, requires assist for most IADL, some ADL    Occupational Therapy Goals  Initiated 2/27/2023  1. Patient will perform grooming with modified independence within 7 day(s). 2.  Patient will perform upper body dressing with independence within 7 day(s). 3.  Patient will perform lower body dressing with modified independence within 7 day(s). 4.  Patient will perform all aspects of toileting with modified independence within 7 day(s). 5.  Patient will utilize energy conservation techniques during functional activities with verbal cues within 7 day(s). Outcome: Progressing Towards Goal   OCCUPATIONAL THERAPY EVALUATION  Patient: Rocío Luther (16 y.o. female)  Date: 2/27/2023  Primary Diagnosis: Hospital-acquired pneumonia [J18.9, Y95]       Precautions:  Fall (Droplet+)    ASSESSMENT  Based on the objective data described below, the patient presents with impaired balance, activity tolerance, and strength impacting ability to complete ADL/iADL at baseline. Patient received reclined in bed, amenable to session with family present. Completed transfers/functional mobility with x1 handheld assist, will likely benefit from short term use of RW for support. Completed transfer to toilet and hygiene with overall CGA-min A. Currently patient on 4L O2 via NC, SpO2 with brief drop to 87% with activity, quick recovery with cued breathing and seated rest breaks to 94%.  Anticipate patient is just below baseline, [facility] discharge to maximize ADL independence and safety if returning home. Current Level of Function Impacting Discharge (ADLs/self-care): x1 assist ADL/functional mobility    Functional Outcome Measure: The patient scored 19/24 on the [facility] functional outcome measure. A score of ?191,2,3 had higher odds of discharging home with [facility] or needs of SNF/IPR       Other factors to consider for discharge: below baseline, fall risk, supportive family     Patient will benefit from skilled therapy intervention to address the above noted impairments. PLAN :  Recommendations and Planned Interventions: self care training, functional mobility training, therapeutic exercise, balance training, therapeutic activities, endurance activities, patient education, home safety training, and family training/education    Frequency/Duration: Patient will be followed by occupational therapy 3 times a week to address goals. Recommendation for discharge: (in order for the patient to meet his/her long term goals)  Occupational therapy at least 2 days/week in the home AND ensure assist and/or supervision for safety with ADL/IADL    This discharge recommendation:  Has not yet been discussed the attending provider and/or case management    IF patient discharges home will need the following DME: TBD pending progress       SUBJECTIVE:   Patient stated I do most of my things myself.     OBJECTIVE DATA SUMMARY:   HISTORY:   Past Medical History:   Diagnosis Date    Arrhythmia     afib    CLL (chronic lymphocytic leukemia) ([facility] [address]     History of chemotherapy     For chronic lymphacytic leukemia    Menopause 1986     Past Surgical History:   Procedure Laterality Date    HX BREAST BIOPSY Bilateral     surgical-benign    HX HYSTERECTOMY  1986    HX OOPHORECTOMY Bilateral 1986    HI UNLISTED PROCEDURE CARDIAC SURGERY         Expanded or extensive additional review of patient history:     Home Situation  Home Environment: Private residence  # Steps to Enter: 3  Rails to Enter: Yes  Hand Rails : Bilateral  One/Two Story Residence: Two story  # of Interior Steps: 21  Interior Rails: Left  Lift Chair Available: No  Living Alone: No  Support Systems: Child(latoya)  Patient Expects to be Discharged to[de-identified] Home  Current DME Used/Available at Home: Walker, Wheelchair, Commode, bedside, Shower chair  Tub or Shower Type: Tub/Shower combination    Hand dominance: Right    EXAMINATION OF PERFORMANCE DEFICITS:  Cognitive/Behavioral Status:  Neurologic State: Alert  Orientation Level: Oriented to time  Cognition: Appropriate decision making; Appropriate safety awareness; Follows commands  Perception: Appears intact  Perseveration: No perseveration noted  Safety/Judgement: Awareness of environment    Skin: intact where visible    Edema: none noted    Hearing: Auditory  Auditory Impairment: Hard of hearing, bilateral    Vision/Perceptual:                           Acuity: Impaired near vision    Corrective Lenses: Reading glasses    Range of Motion:  AROM: Generally decreased, functional                         Strength:  Strength: Generally decreased, functional                Coordination:  Coordination: Generally decreased, functional  Fine Motor Skills-Upper: Left Intact; Right Intact    Gross Motor Skills-Upper: Right Impaired    Tone & Sensation:  Tone: Normal  Sensation: Intact                      Balance:  Sitting: Intact  Standing: Impaired; Without support  Standing - Static: Fair;Constant support  Standing - Dynamic : Fair;Constant support    Functional Mobility and Transfers for ADLs:  Bed Mobility:  Supine to Sit: Contact guard assistance; Additional time;Bed Modified (HOB elevated and rail used; OOB on L)  Sit to Supine:  (NT; OOB to chair)  Scooting: Contact guard assistance; Additional time    Transfers:  Sit to Stand: Minimum assistance; Additional time  Stand to Sit: Contact guard assistance; Additional time (cues for alignment, reach back, controlled descent)    ADL Assessment:  Feeding: Setup    Oral Facial Hygiene/Grooming: Contact guard assistance    Bathing: Moderate assistance    Upper Body Dressing: Minimum assistance    Lower Body Dressing: Minimum assistance    Toileting: Minimum assistance                ADL Intervention and task modifications:       Grooming  Position Performed: Standing  Washing Hands: Contact guard assistance  Cues: Verbal cues provided                        Toileting  Bladder Hygiene: Contact guard assistance  Bowel Hygiene: Minimum assistance  Clothing Management: Minimum assistance  Cues: Verbal cues provided    Cognitive Retraining  Safety/Judgement: Awareness of environment    Functional Measure:  Edgar Nieves AM-PAC®      Daily Activity Inpatient Short Form (6-Clicks) Version 2  How much HELP from another person do you currently need. .. (If the patient hasn't done an activity recently, how much help from another person do you think they would need if they tried?) Total A Lot A Little None   1. Putting on and taking off regular lower body clothing? []   1 []   2 [x]   3 []   4   2. Bathing (including washing, rinsing, drying)? []   1 []   2 [x]   3 []   4   3. Toileting, which includes using toilet, bedpan, or urinal? []   1 []   2 [x]   3 []   4   4. Putting on and taking off regular upper body clothing? []   1 []   2 [x]   3 []   4   5. Taking care of personal grooming such as brushing teeth? []   1 []   2 [x]   3 []   4   6. Eating meals? []   1 []   2 []   3 [x]   4     Raw Score: 19/24                            Cutoff score ?191,2,3 had higher odds of discharging home with home health or need of SNF/IPR    1. Alpa Argueta. Validity of the AM-PAC 6-Clicks Inpatient Daily Activity and Basic Mobility Short Forms. Physical Therapy Mar 2014, [phone]; DOI: 10.2522/ptj.87937163  2. Faustino Palmer. Association of AM-PAC \"6-Clicks\" Basic Mobility and Daily Activity Scores With Discharge Destination. Phys Ther. 2021 Apr 4;101(4):khqq992. doi: 10.1093/ptj/yowe787. PMID: 16982187. [address], Ronel INGRAM, Emeka Johnson, Yordan K, Demetria S. Activity Measure for Post-Acute Care \"6-Clicks\" Basic Mobility Scores Predict Discharge Destination After Acute Care Hospitalization in Select Patient Groups: A Retrospective, Observational Study. Arch Rehabil Res Clin Transl. 2022 Jul 16;4(3):155309. doi: 10.1016/j.arrct. 6915.722938. PMID: 28975157; PMCID: [id number]. 4. Meryle Quay, Coster W, Екатерина REGALADO. AM-PAC Short Forms Manual 4.0. Revised 2/2020. Occupational Therapy Evaluation Charge Determination   History Examination Decision-Making   LOW Complexity : Brief history review  LOW Complexity : 1-3 performance deficits relating to physical, cognitive , or psychosocial skils that result in activity limitations and / or participation restrictions  LOW Complexity : No comorbidities that affect functional and no verbal or physical assistance needed to complete eval tasks       Based on the above components, the patient evaluation is determined to be of the following complexity level: LOW   Pain Rating:  None reported    Activity Tolerance:   Fair and requires rest breaks    After treatment patient left in no apparent distress:    Supine in bed, Heels elevated for pressure relief, Call bell within reach, Caregiver / family present, and Side rails x 3    COMMUNICATION/EDUCATION:   The patients plan of care was discussed with: Physical therapist and Registered nurse. Home safety education was provided and the patient/caregiver indicated understanding., Patient/family have participated as able in goal setting and plan of care. , and Patient/family agree to work toward stated goals and plan of care. This patients plan of care is appropriate for delegation to [facility].     Thank you for this referral.  Wesley Carl, OT  Time Calculation: 46 mins

## 2023-02-27 NOTE — PROGRESS NOTES
Orders received, chart reviewed and patient evaluated by occupational therapy. Pending progression with skilled acute occupational therapy, recommend:  Occupational therapy at least 2 days/week in the home AND ensure assist and/or supervision for safety with ADL/IADL      Recommend with nursing ADLs with supervision/setup, OOB to chair 3x/day and toileting via functional mobility to and from bathroom x1 assist and walker. Thank you for completing as able in order to maintain patient strength, endurance and independence. Full evaluation to follow.      Trang Solorzano MS, OTR/L

## 2023-02-27 NOTE — PROGRESS NOTES
JOSE- D/c to home with Nemesio obtained [facility] (resumption of care) home health orders and sent them to [facility] via [address] [place] [address],[facility]. They have accepted this pt back. Desmond Rosas

## 2023-02-27 NOTE — NURSE NAVIGATOR
HEART FAILURE NURSE NAVIGATOR NOTE  [facility]    Patient chart was reviewed by Heart Failure (HF) Nurse Navigators for compliance of prescribed treatment with guidelines directed medical therapy (GDMT) and HF database completed. Please, review beneath recommendations for symptomatic patients with HF with Preserved Ejection Fraction ? 50% (HFpEF) for your consideration when taking care of this patient. Current HF Medical Therapy:      Name Stiven CARDENAS 1933   LVEF >70%   NYHA Functional Class Need documentation   ARNi/ACEi/ARB    Aldosterone Antagonist    SGLT2 inhibitor    Consulting Cardiologist Not yet consulted       Recommendations for HF Management:    For patients with HFpEF ? 50%, consider adding the following GDMT, if appropriate:  SGLT2 inhibitor [Class 2a]  ARNi or ARB [Class 2b]  Aldosterone antagonist [Class 2b]  Adjust antihypertensive therapy [Class 1]  Adjust diuretic dose at discharge if hospitalized for volume overload [Class 1]  For patient with hyperkalemia while on RAASi > 5.5, consider adding potassium binders (patiromer, sodium zirconium cycosilicate) [Class 2a]    Patients with suspected cardiac amyloidosis (older > 61years old with LVH > 1.2cm and/or any other signs of amyloidosis) should be offered screening labs and imaging [Class 1]: (a) serum gammopathy profile and UPEP with immunofixation, and (b) PYP test. If PYP test is positive patient should have genetic testing done for inherited ATTR amyloidosis. If any findings are positive or you need genetic testing ordered, please, consider in-patient consultation or referral to [address] [place] [address]. Note that the following medications may be potentially harmful in heart failure [Class 3].   Calcium channel blockers (doxazosin, diltiazem, verapamil, nifedipine)  Antiarrhythmics (flecanide, disopyrimide, sotalol, dronedarone)  Diabetes medications (thiasolidinediones, saxagliptin, alogliptin)  NSAIDs and MCGRAW 2 inhibitors    Consider vaccinations for respiratory illnesses (flu, pneumonia, covid) [Class 2b]    Due to h/o recurrent hospitalizations this patient may benefit from referral to Advanced Heart Failure Program to assess suitability for advanced therapies, such as left-ventricular assist device, heart transplantation, palliative inotropes or palliation [Class 1]. To obtain in-patient consultation or refer to [address] [place] [address], call [phone]    Patient Heart Failure Education:     Teach back in heart failure education provided, including information about medical therapy, lifestyle modifications, diet and fluid restrictions, physical activity. Educational resources provided: Living with Heart Failure booklet; Signs/Symptoms magnet; Weight Calendar; Dis[facility] Health flyer; Preparing for Successful Discharge check list.  Information provided about HF support group. Heart failure avoiding triggers on discharge instructions. Plan for Transitional Care:    Post discharge follow up phone call to be made within 48-72 hours of discharge. Patient will follow-up with PCP. Patient will follow-up with Primary Cardiologist.  Patient screened for obstructive sleep apnea and referred to Sleep Medicine. Referral/follow-up with Cardiac Rehabilitation. Referral/follow-up with Advanced Heart Failure Specialist.  Referral/follow-up with Palliative Care Specialist.        Heart Failure Nurse Navigator  Phone: [phone] / [phone]    *Recommendations listed above are based on the guidelines: 2022 AHA/ACC/HFSA Guideline for the Management of Heart Failure: A Report of the [address] on Clinical Practice Guidelines. Circulation 2022; [id number].  and 2017 AHA/ACC/HRS guideline for management of patients with ventricular arrhythmias and the prevention of sudden cardiac death: A Report of the American College of Cardiology/American Heart Association Task Force on Clinical Practice Guidelines and the [employer].  Heart Rhythm, Vol 15, No 10, October 2018 *Class of Recommendation: Class 1 (strong), Class 2a (moderate), Class 2b (weak), Class 3 (not recommended, potentially harmful)

## 2023-02-27 NOTE — PROGRESS NOTES
JOSE- Return home with home health. Reason for Admission:   Increased shortness of breath. RUR Score:    19%              PCP: First and Last name:   Sheryl Giraldo MD     Name of Practice:    Are you a current patient: Yes/No:    Approximate date of last visit:    Can you participate in a virtual visit if needed:     Do you (patient/family) have any concerns for transition/discharge? no                Plan for utilizing home health:  Pt is open to [facility]. Current Advanced Directive/Advance Care Plan:  DNR      Healthcare Decision Maker:   Click here to complete [address] including selection of the Healthcare Decision Maker Relationship (ie \"Primary\")              Transition of Care Plan:       CM spoke with pt's daughter in law, Sharita, to introduce them to the role of CM and transition of care. This pt lives alone, but there is always a family member with her. Per the dtr in law, the family takes turns staying with this pt and caring for her. Gabby Hurt stated that she had home health set up for pt with [facility]. Pt has home O2 with a concentrator and portable tanks. CM will follow. [facility] Management Interventions  PCP Verified by CM:  Yes  Palliative Care Criteria Met (RRAT>21 & CHF Dx)?: No  Transition of Care Consult (CM Consult): Discharge Planning  MyChart Signup: No  Discharge Durable Medical Equipment: No  Physical Therapy Consult: Yes  Occupational Therapy Consult: Yes  Speech Therapy Consult: No  Support Systems: Child(latoya)  Confirm Follow Up Transport: Family  The Plan for Transition of Care is Related to the Following Treatment Goals : safe d/c  The Patient and/or Patient Representative was Provided with a Choice of Provider and Agrees with the Discharge Plan?: Yes  Freedom of Choice List was Provided with Basic Dialogue that Supports the Patient's Individualized Plan of Care/Goals, Treatment Preferences and Shares the Quality Data Associated with the Providers?: Yes   Resource Information Provided?: No  Discharge Location  Patient Expects to be Discharged to[de-identified] Home     Readmission Assessment  Number of days since last admission?: 8-30 days  Previous disposition: Home with Family  Who is being interviewed?: Caregiver  What was the patient's/caregiver's perception as to why they think they needed to return back to the hospital?: Other (Comment)  Did you visit your Primary Care Physician after you left the hospital, before you returned this time?: Yes  Did you see a specialist, such as Cardiac, Pulmonary, Orthopedic Physician, etc. after you left the hospital?: No  Who advised the patient to return to the hospital?: Self-referral  In our efforts to provide the best possible care to you and others like you, can you think of anything that we could have done to help you after you left the hospital the first time, so that you might not have needed to return so soon?: Other (Comment)

## 2023-02-27 NOTE — CONSULTS
Pulmonary, Critical Care, and Sleep Medicine~Consult Note    Name: Louis Cevallos MRN: [id number]   : 1933 [facility]    Date: 2023 10:11 AM Admission: 2023     Impression Plan   Acute and chronic hypoxic resp failure  Bilateral pulmonary infiltrates, suspect pulmonary edema +/- HAP. Query hypogammaglobulinemia   Hx of COVID19 +. Was on paxlovid on outpatient. First + was in early 2023. CLL with diffuse lymphadenopathy. Followed at [facility]. No on chemo/rxt  A fib. S/p cardioversion on   Minimal smoking hx  Check immunoglobulins while here; she could be a candidate for IVIG  CRP  Restart outpatient decadron   On diuretics   On eliquis   Empiric abx   Potassium being repleted. Discussed with family and hospitalist      Daily Progression:    Consult Note requested by hospitalist service. Patient was recently hospitalized in early feb for COVID19 +, decom HFpEF and CAP. Was treated with steroids, abx and diuretics ; O2 was started and she was discharged home. Per family was doing well until this past week. Noted increased pedal edema; was on a small amount of diuretics. No weight increased. Had a cardioversion this past Friday. Diuretics were stopped following. Noted progressive worsening dyspnea saturday through . Some reported fevers; WBC was 18.2 on admission, procal normal. Was on steroids at 4mg daily by Dr Estefania Alva as outpatient basis. Had minimal smoking hx. Melvin Reining still elevated. Imaging on this admission showed worsening infiltrates and small effusions. Discussed with her primary pulmonologist and her outpatient IgG was 160. I have reviewed the labs and previous days notes. A comprehensive review of systems was negative.   Past Medical History:   Diagnosis Date    Arrhythmia     afib    CLL (chronic lymphocytic leukemia) ([facility] [address]     History of chemotherapy     For chronic lymphacytic leukemia    Menopause       Past Surgical History:   Procedure Laterality Date    HX BREAST BIOPSY Bilateral     surgical-benign    HX HYSTERECTOMY  1986    HX OOPHORECTOMY Bilateral 1986    CT UNLISTED PROCEDURE CARDIAC SURGERY        Prior to Admission medications    Medication Sig Start Date End Date Taking? Authorizing Provider   fenofibrate (LOFIBRA) 160 mg tablet Take 160 mg by mouth daily. Yes Provider, Historical   esomeprazole (NexIUM) 20 mg capsule Take 20 mg by mouth daily. Yes Provider, Historical   cholecalciferol (Vitamin D3) 25 mcg (1,000 unit) cap Take 1,000 Units by mouth daily. Yes Provider, Historical   ascorbic acid, vitamin C, (Vitamin C) 500 mg tablet Take 500 mg by mouth daily. Yes Provider, Historical   apixaban (ELIQUIS) 2.5 mg tablet Take 2.5 mg by mouth two (2) times a day. Yes Provider, Historical   polyethylene glycol (Miralax) 17 gram/dose powder Take 17 g by mouth nightly. Yes Provider, Historical   aluminum hydrox-magnesium carb (Gaviscon)  mg/15 mL suspension Take 15 mL by mouth every six (6) hours as needed for Indigestion. Yes Provider, Historical   aspirin 81 mg chewable tablet Take 1 Tablet by mouth daily. 2/8/23   Carla Hardy MD   guaiFENesin ER (MUCINEX) 600 mg ER tablet Take 1 Tablet by mouth every twelve (12) hours. 2/7/23   Carla Hardy MD   metoprolol tartrate (LOPRESSOR) 25 mg tablet Take 1 Tablet by mouth every twelve (12) hours. 2/8/23   Carla Hardy MD   potassium chloride (K-DUR, KLOR-CON M20) 20 mEq tablet Take 1 Tablet by mouth daily. 2/7/23   Carla Hardy MD     Allergies   Allergen Reactions    Penicillins Anaphylaxis    Ciprofloxacin Myalgia    Singulair [Montelukast] Cough      Social History     Tobacco Use    Smoking status: Never    Smokeless tobacco: Never   Substance Use Topics    Alcohol use: Not on file      No family history on file.   OBJECTIVE:     Vital Signs:     Visit Vitals  BP (!) 157/74   Pulse 92   Temp 98.4 °F (36.9 °C) Resp 18   Ht 4' 11\" (1.499 m)   Wt 54.3 kg (119 lb 11.4 oz)   SpO2 94%   BMI 24.18 kg/m²      Temp (24hrs), Av.1 °F (36.7 °C), Min:97.7 °F (36.5 °C), Max:98.4 °F (36.9 °C)     Intake/Output:     Last shift: No intake/output data recorded.     Last 3 shifts:  1901 -  0700  In: -   Out: 2401 [Urine:2400]        Intake/Output Summary (Last 24 hours) at 2023 1011  Last data filed at 2023 4411  Gross per 24 hour   Intake --   Output 2401 ml   Net -2401 ml       Physical Exam:                                        Exam Findings Other   General: No resp distress noted, appears stated age    [de-identified]:  No ulcers, JVD not elevated, no cervical LAD    Chest: No pectus deformity, normal chest rise b/l    HEART:  RRR, noted murmur     Lungs:  CTA b/l, no rhonchi/crackles/wheeze, diminished BS at bases    ABD: Soft/NT, non rigid mildly distended    EXT: Mild edema     Skin: No rashes or ulcers, no mottling    Neuro: A/O x 3        Medications:  Current Facility-Administered Medications   Medication Dose Route Frequency    sodium chloride (NS) flush 5-40 mL  5-40 mL IntraVENous Q8H    sodium chloride (NS) flush 5-40 mL  5-40 mL IntraVENous PRN    azithromycin (ZITHROMAX) 500 mg in 0.9% sodium chloride 250 mL ([id number])  500 mg IntraVENous Q24H    acetaminophen (TYLENOL) tablet 650 mg  650 mg Oral Q4H PRN    furosemide (LASIX) injection 20 mg  20 mg IntraVENous Q12H    cefepime (MAXIPIME) 2 g in 0.9% sodium chloride (MBP/ADV) 100 mL MBP  2 g IntraVENous Q12H    aspirin chewable tablet 81 mg  81 mg Oral DAILY    guaiFENesin ER (MUCINEX) tablet 600 mg  600 mg Oral Q12H    metoprolol tartrate (LOPRESSOR) tablet 25 mg  25 mg Oral Q12H    polyethylene glycol (MIRALAX) packet 17 g  17 g Oral DAILY PRN    aluminum-magnesium hydroxide (MAALOX) oral suspension 30 mL  30 mL Oral QID PRN    apixaban (ELIQUIS) tablet 2.5 mg  2.5 mg Oral BID       Labs:  AB Recent Labs     23  1825   PHI 7.51*   PCO2I 38.1   PO2I 80   HCO3I 30.2*   SO2I 96.8        CBC Recent Labs     02/27/23  0329 02/26/23  1321   WBC 13.4* 18.2*   HGB 8.5* 8.8*   HCT 26.9* 28.9*    332   MCV 83.8 84.3   MCH 26.5 50.3*        Metabolic  Panel Recent Labs     02/27/23  0329 02/26/23  1321   * 132*   K 3.3* 4.1   CL 98 96*   CO2 31 29   GLU 92 170*   BUN 14 15   CREA 0.42* 0.47*   CA 9.4 9.4   MG 2.2 2.1   ALB  --  3.0*   ALT  --  20        Pertinent Labs                Alexander Cueva PA-C  2/27/2023

## 2023-02-27 NOTE — PROGRESS NOTES
Patient/family seen: YES       Informed patient/family of BPCI-A Bundle Program. Also advised of potential outreach by Care Transitions Team.    Bundle Payment Care Improvement Beneficiary Letter Delivered to Beneficiary or Representative:YES.  Date BCPI -A was given:02/27/23

## 2023-02-27 NOTE — PROGRESS NOTES
Comprehensive Nutrition Assessment    Type and Reason for Visit: Initial, Positive nutrition screen    Nutrition Recommendations/Plan:   Replete potassium  Continue regular diet  Added high calorie high protein ons and snacks throughout the day       Malnutrition Assessment:  Malnutrition Status:  Mild malnutrition (02/27/23 1510)    Context:  Acute illness     Findings of the 6 clinical characteristics of malnutrition:   Energy Intake:  Mild decrease in energy intake (specify)  Weight Loss:  Greater than 2% over 1 week     Body Fat Loss:  No significant body fat loss,     Muscle Mass Loss:  Unable to assess,    Fluid Accumulation:  No significant fluid accumulation,     Strength:  Not performed        Nutrition Assessment:    PMHx: CLL s/p chemo, CHF    80 y.o. female admitted with HAP poa, recently hospitalized for COVID pna, acute on chronic respiratory failure, acute on chronic heart failure. Pt on abx, steroid, diuretic. Visited pt and family at bedside. Per family pt was 115# at PCP last week. Brought standing scale in room- pt 106# which she was surprised to see. Per family pt had 2L of fluid on her last week which would account for ~4.5# of wt loss so some true wt loss has occurred. Pt is small but appears to have appropriate fat and muscle stores for her age. Meets ASPEN criteria for mild malnutrition r/t wt loss. Pt doesn't have an appetite for large meals and so at home focuses on protein and calorie rich foods like yogurt and avocado. She ate <50% of her breakfast and lunch today but did eat the protein foods on her tray. She's agreeable to vanilla ensure, yogurt, boiled eggs, ice cream, string cheese, pb crackers, PB&J sandwich as snacks throughout the day- all added. She is very pleasant, demonstrated a desire to gain weight when she saw her standing scale wt.  I encouraged her use of small frequent meals dense in kcals and protein, and we talked about eating on a schedule every 2-3 hours to help with wt gain. K 3.3    Nutritionally Significant Medications:  Zithromax, cefepime, decadron, lasix, mucinex, lopressor, prn maalox      Estimated Daily Nutrient Needs:  Energy Requirements Based On: Kcal/kg  Weight Used for Energy Requirements: Current  Energy (kcal/day): 1440 (30 kcal/kg)  Weight Used for Protein Requirements: Current  Protein (g/day): 67 (1.4 g/kg)     Fluid (ml/day): 1 ml/kcal    Nutrition Related Findings:   Edema: LLE: 2+ (2/27/2023 11:15 AM)  RLE: 2+ (2/27/2023 11:15 AM)    Last BM: 02/27/23,      Wounds: None      Current Nutrition Therapies:  Diet: regular  Supplements: none  Meal intake: No data found. Supplement intake: No data found. Nutrition Support: none      Anthropometric Measures:  Height: 4' 11\" (149.9 cm)  Ideal Body Weight (IBW): 95 lbs (43 kg)     Current Body Wt:  48.1 kg (106 lb 0.7 oz), 111.6 % IBW.  Standing scale  Current BMI (kg/m2): 21.4  Usual Body Weight: 52.2 kg (115 lb)  % Weight Change (Calculated): -7.8  Weight Adjustment: No adjustment                 BMI Category: Underweight (BMI less than 22) age over 72    Wt Readings from Last 10 Encounters:   02/27/23 48.1 kg (106 lb)   02/07/23 54.2 kg (119 lb 7.8 oz)           Nutrition Diagnosis:   Unintended weight loss related to inadequate protein-energy intake as evidenced by weight loss greater than or equal to 2% in 1 week    Nutrition Interventions:   Food and/or Nutrient Delivery: Start oral nutrition supplement, Snacks (specify), Continue current diet  Nutrition Education/Counseling: Counseling completed  Coordination of Nutrition Care: Continue to monitor while inpatient       Goals:     Goals: PO intake 50% or greater, by next RD assessment       Nutrition Monitoring and Evaluation:   Behavioral-Environmental Outcomes: None identified  Food/Nutrient Intake Outcomes: Food and nutrient intake, Supplement intake  Physical Signs/Symptoms Outcomes: Biochemical data, Meal time behavior, Nutrition focused physical findings, Weight    Discharge Planning:    Continue oral nutrition supplement, Continue current diet      Recent Labs     02/27/23  0329 02/26/23  1321   GLU 92 170*   BUN 14 15   CREA 0.42* 0.47*   * 132*   K 3.3* 4.1   CL 98 96*   CO2 31 29   CA 9.4 9.4   MG 2.2 2.1       Recent Labs     02/26/23  1321   ALT 20   AST 16   AP 77   TBILI 0.8   TP 5.5*   ALB 3.0*   GLOB 2.5       Recent Labs     02/26/23  1540   LAC 1.0       Recent Labs     02/27/23  0329 02/26/23  1321   WBC 13.4* 18.2*   HGB 8.5* 8.8*   HCT 26.9* 28.9*    332       No results for input(s): PREALB in the last 72 hours. No results found for: PREALB    No results for input(s): TRIGL in the last 72 hours. No results found for: TRIGL    No results for input(s): GLUCPOC in the last 72 hours. No results found for: HBA1C, QUB5AKWT, TDC0KMVU    Iron Profile    No results for input(s): IRON, FOL, B12LT, VB6LT in the last 72 hours. No results for input(s): NH4 in the last 72 hours.     No results found for: IRON, TIBC, PSAT    No results found for: FERR    B Vitamins  No results found for: FOL, B12LT, VB6LT, VIB1LT    Zinc  No results found for: ZINCLT    Copper  No results found for: COSLT    Selenium  No results found for: SELNLT    Vit C  No results found for: [id number]    Fat Soluble Vitamins    No results found for: VITALT, VITD3, VITE1T, VITEG, VIK1LT    No results found for: NH4        Simón Rumlinda, RD  Available via [employer]

## 2023-02-27 NOTE — PROGRESS NOTES
Problem: Mobility Impaired (Adult and Pediatric)  Goal: *Acute Goals and Plan of Care (Insert Text)  Description: FUNCTIONAL STATUS PRIOR TO ADMISSION: Pt reports using a rolling walker PTA. HOME SUPPORT PRIOR TO ADMISSION: The patient lives with her children and has required increased assistance since hospital admission earlier this month (pt admitted with Claritza in Jan and Feb 2023, required return home with 2L NC at that time; underwent cardioversion 2/24 and now requiring 4L NC). Physical Therapy Goals  Initiated 2/27/2023  1. Patient will move from supine to sit and sit to supine  in bed with modified independence within 7 day(s). 2.  Patient will transfer from bed to chair and chair to bed with supervision/set-up using the least restrictive device within 7 day(s). 3.  Patient will perform sit to stand with supervision/set-up within 7 day(s). 4.  Patient will ambulate with supervision/set-up for 100 feet with the least restrictive device within 7 day(s). 5.  Patient will ascend/descend 3 stairs with bilateral handrail(s) with minimal assistance/contact guard assist within 7 day(s). Outcome: Progressing Towards Goal   PHYSICAL THERAPY EVALUATION  Patient: Milton East (54 y.o. female)  Date: 2/27/2023  Primary Diagnosis: Hospital-acquired pneumonia [J18.9, Y95]       Precautions:   Fall (Droplet+)    ASSESSMENT  Pt seen following RN clearance and OT session. Pt with son and DIL bedside and supportive. Pt agreeable to bed mobility, transfers, gait training, and OOB to chair following session events. SpO2/vitals also assessed throughout session events:    Vitals:    02/27/23 1007 02/27/23 1013   BP: (!) 157/74    Pulse: 92 (at rest) 94 (walking)   SpO2: 94% 4L NC (!) 87% 4L NC     Based on the objective data described below, the patient presents with generally decreased strength, balance, and activity tolerance on 4L NC. Pt's functional mobility currently below her baseline status.  Pt and family report they plan for her to return home once medically stable with their support. Pt has DME needed for home. This Tiffanie Outhouse is recommending HHPT as well and continued O2 support. Ms Braxton Pedro is pleasant and motivated. She has been educated on being OOB in chair throughout the day and walking with family support in her room as tolerated on NC. Pt left in NAD following session events and RN Notified of SpO2 during session. Of note: pt reports she does not need to access her second story upon return home    Current Level of Function Impacting Discharge (mobility/balance): upwards Odette    Functional Outcome Measure: The patient scored 13 on the Tinetti outcome measure which is indicative of high fall risk. Other factors to consider for discharge: supportive family; decline since January in pulmonary status     Patient will benefit from skilled therapy intervention to address the above noted impairments. PLAN :  Recommendations and Planned Interventions: bed mobility training, transfer training, gait training, therapeutic exercises, edema management/control, patient and family training/education, and therapeutic activities      Frequency/Duration: Patient will be followed by physical therapy:  5 times a week to address goals. Recommendation for discharge: (in order for the patient to meet his/her long term goals)  Physical therapy at least 2 days/week in the home  with family's assistance    This discharge recommendation:  Has not yet been discussed the attending provider and/or case management    IF patient discharges home will need the following DME: patient owns DME required for discharge         SUBJECTIVE:   Patient stated Maybe just a little [SOB post walking]. ..    OBJECTIVE DATA SUMMARY:   HISTORY:    Past Medical History:   Diagnosis Date    Arrhythmia     afib    CLL (chronic lymphocytic leukemia) ([facility] [address]     History of chemotherapy     For chronic lymphacytic leukemia    Menopause 1986 Past Surgical History:   Procedure Laterality Date    HX BREAST BIOPSY Bilateral     surgical-benign    HX HYSTERECTOMY  1986    HX OOPHORECTOMY Bilateral 1986    [place] UNLISTED PROCEDURE CARDIAC SURGERY         Personal factors and/or comorbidities impacting plan of care: supportive family. Pt is pleasant and compliant per this discussion. Home Situation  Home Environment: Private residence  # Steps to Enter: 3  Rails to Enter: Yes  Hand Rails : Bilateral  One/Two Story Residence: Two story  # of Interior Steps: 21  Interior Rails: Left  Lift Chair Available: No  Living Alone: No  Support Systems: Child(latoya)  Patient Expects to be Discharged to[de-identified] Home  Current DME Used/Available at Home: [employer] [place], Wheelchair, Commode, bedside, Shower chair  Tub or Shower Type: Tub/Shower combination    EXAMINATION/PRESENTATION/DECISION MAKING:   Critical Behavior:  Neurologic State: Alert  Orientation Level: Oriented to time  Cognition: Appropriate decision making, Appropriate safety awareness, Follows commands  Safety/Judgement: Awareness of environment  Hearing: Auditory  Auditory Impairment: Hard of hearing, bilateral    Range Of Motion:  AROM: Generally decreased, functional                       Strength:    Strength: Generally decreased, functional                    Tone & Sensation:   Tone: Normal              Sensation: Intact               Coordination:  Coordination: Generally decreased, functional  Vision:   Acuity: Impaired near vision  Corrective Lenses: Reading glasses  Functional Mobility:  Bed Mobility:     Supine to Sit: Contact guard assistance; Additional time;Bed Modified (HOB elevated and rail used; OOB on L)  Sit to Supine:  (NT; OOB to chair)  Scooting: Contact guard assistance; Additional time  Transfers:  Sit to Stand: Minimum assistance; Additional time  Stand to Sit: Contact guard assistance; Additional time (cues for alignment, reach back, controlled descent)                       Balance:   Sitting: Intact  Standing: Impaired; Without support  Standing - Static: Fair;Constant support  Standing - Dynamic : Fair;Constant support  Ambulation/Gait Training:  Distance (ft): 20 Feet (ft) (x4)  Assistive Device: Gait belt (L HHA)  Ambulation - Level of Assistance: Minimal assistance; Additional time (unilateral support)     Gait Description (WDL): Exceptions to WDL  Gait Abnormalities: Decreased step clearance;Shuffling gait        Base of Support: Center of gravity altered     Speed/Callie: Slow;Shuffled  Step Length: Right shortened;Left shortened     Stairs:     Stairs - Level of Assistance:  (NT)        Functional Measure:  Tinetti test:    Sitting Balance: 1  Arises: 1  Attempts to Rise: 1  Immediate Standing Balance: 1  Standing Balance: 1  Nudged: 0  Eyes Closed: 0  Turn 360 Degrees - Continuous/Discontinuous: 0  Turn 360 Degrees - Steady/Unsteady: 0  Sitting Down: 1  Balance Score: 6 Balance total score  Indication of Gait: 1  R Step Length/Height: 0  L Step Length/Height: 0  R Foot Clearance: 1  L Foot Clearance: 1  Step Symmetry: 1  Step Continuity: 1  Path: 1  Trunk: 0  Walking Time: 1  Gait Score: 7 Gait total score  Total Score: 13/28 Overall total score         Tinetti Tool Score Risk of Falls  <19 = High Fall Risk  19-24 = Moderate Fall Risk  25-28 = Low Fall Risk  Tinetti ME. Performance-Oriented Assessment of Mobility Problems in Elderly Patients. [address] 66; [id number].  (Scoring Description: PT Bulletin Feb. 10, 1993)    Older adults: Ceci Varela et al, 2009; n = 1000 [facility] elderly evaluated with ABC, OZZIE, ADL, and IADL)  · Mean OZZIE score for males aged 69-68 years = 26.21(3.40)  · Mean OZZIE score for females age 69-68 years = 25.16(4.30)  · Mean OZZIE score for males over 80 years = 23.29(6.02)  · Mean OZZIE score for females over 80 years = 17.20(8.32)         Pain Rating:  Pt initially with L breast pain with bed mobility- this eased with increased time sitting; no VAS provided    Activity Tolerance: Fair, desaturates with exertion and requires oxygen, requires rest breaks, and observed SOB with activity    After treatment patient left in no apparent distress:   Sitting in chair, Call bell within reach, and Caregiver / family present    COMMUNICATION/EDUCATION:   The patients plan of care was discussed with: Registered nurse. Fall prevention education was provided and the patient/caregiver indicated understanding., Patient/family have participated as able in goal setting and plan of care. , and Patient/family agree to work toward stated goals and plan of care.     Thank you for this referral.  Josué Piña   Time Calculation: 29 mins

## 2023-02-28 ENCOUNTER — APPOINTMENT (OUTPATIENT)
Dept: CT IMAGING | Age: 88
End: 2023-02-28
Attending: FAMILY MEDICINE
Payer: MEDICARE

## 2023-02-28 LAB
ALBUMIN SERPL-MCNC: 2.5 G/DL (ref 3.5–5)
ALBUMIN/GLOB SERPL: 0.9 (ref 1.1–2.2)
ALP SERPL-CCNC: 68 U/L (ref 45–117)
ALT SERPL-CCNC: 16 U/L (ref 12–78)
ANION GAP SERPL CALC-SCNC: 3 MMOL/L (ref 5–15)
AST SERPL-CCNC: 18 U/L (ref 15–37)
BASOPHILS # BLD: 0 K/UL (ref 0–0.1)
BASOPHILS NFR BLD: 0 % (ref 0–1)
BILIRUB SERPL-MCNC: 0.7 MG/DL (ref 0.2–1)
BUN SERPL-MCNC: 14 MG/DL (ref 6–20)
BUN/CREAT SERPL: 35 (ref 12–20)
CALCIUM SERPL-MCNC: 9 MG/DL (ref 8.5–10.1)
CHLORIDE SERPL-SCNC: 100 MMOL/L (ref 97–108)
CO2 SERPL-SCNC: 30 MMOL/L (ref 21–32)
CREAT SERPL-MCNC: 0.4 MG/DL (ref 0.55–1.02)
CRP SERPL-MCNC: 12.4 MG/DL (ref 0–0.6)
DIFFERENTIAL METHOD BLD: ABNORMAL
EOSINOPHIL # BLD: 0 K/UL (ref 0–0.4)
EOSINOPHIL NFR BLD: 0 % (ref 0–7)
ERYTHROCYTE [DISTWIDTH] IN BLOOD BY AUTOMATED COUNT: 16.4 % (ref 11.5–14.5)
GLOBULIN SER CALC-MCNC: 2.8 G/DL (ref 2–4)
GLUCOSE SERPL-MCNC: 107 MG/DL (ref 65–100)
HCT VFR BLD AUTO: 24.6 % (ref 35–47)
HGB BLD-MCNC: 8 G/DL (ref 11.5–16)
IGM SERPL-MCNC: <21 MG/DL (ref 40–230)
IMM GRANULOCYTES # BLD AUTO: 0 K/UL
IMM GRANULOCYTES NFR BLD AUTO: 0 %
LYMPHOCYTES # BLD: 6.6 K/UL (ref 0.8–3.5)
LYMPHOCYTES NFR BLD: 53 % (ref 12–49)
MCH RBC QN AUTO: 26.8 PG (ref 26–34)
MCHC RBC AUTO-ENTMCNC: 32.5 G/DL (ref 30–36.5)
MCV RBC AUTO: 82.3 FL (ref 80–99)
MONOCYTES # BLD: 0.2 K/UL (ref 0–1)
MONOCYTES NFR BLD: 2 % (ref 5–13)
NEUTS SEG # BLD: 5.5 K/UL (ref 1.8–8)
NEUTS SEG NFR BLD: 45 % (ref 32–75)
NRBC # BLD: 0 K/UL (ref 0–0.01)
NRBC BLD-RTO: 0 PER 100 WBC
PLATELET # BLD AUTO: 271 K/UL (ref 150–400)
PMV BLD AUTO: 8.5 FL (ref 8.9–12.9)
POTASSIUM SERPL-SCNC: 3.8 MMOL/L (ref 3.5–5.1)
PROT SERPL-MCNC: 5.3 G/DL (ref 6.4–8.2)
RBC # BLD AUTO: 2.99 M/UL (ref 3.8–5.2)
RBC MORPH BLD: ABNORMAL
RETICS # AUTO: 0.11 M/UL (ref 0.02–0.08)
RETICS/RBC NFR AUTO: 3.3 % (ref 0.7–2.1)
SODIUM SERPL-SCNC: 133 MMOL/L (ref 136–145)
WBC # BLD AUTO: 12.3 K/UL (ref 3.6–11)
WBC MORPH BLD: ABNORMAL

## 2023-02-28 PROCEDURE — 74011250637 HC RX REV CODE- 250/637

## 2023-02-28 PROCEDURE — 82784 ASSAY IGA/IGD/IGG/IGM EACH: CPT

## 2023-02-28 PROCEDURE — 71250 CT THORAX DX C-: CPT

## 2023-02-28 PROCEDURE — 80053 COMPREHEN METABOLIC PANEL: CPT

## 2023-02-28 PROCEDURE — 83883 ASSAY NEPHELOMETRY NOT SPEC: CPT

## 2023-02-28 PROCEDURE — 85045 AUTOMATED RETICULOCYTE COUNT: CPT

## 2023-02-28 PROCEDURE — 97530 THERAPEUTIC ACTIVITIES: CPT

## 2023-02-28 PROCEDURE — 74011250637 HC RX REV CODE- 250/637: Performed by: FAMILY MEDICINE

## 2023-02-28 PROCEDURE — 65270000046 HC RM TELEMETRY

## 2023-02-28 PROCEDURE — 74011250636 HC RX REV CODE- 250/636: Performed by: INTERNAL MEDICINE

## 2023-02-28 PROCEDURE — 74011000250 HC RX REV CODE- 250: Performed by: INTERNAL MEDICINE

## 2023-02-28 PROCEDURE — 85025 COMPLETE CBC W/AUTO DIFF WBC: CPT

## 2023-02-28 PROCEDURE — 82787 IGG 1 2 3 OR 4 EACH: CPT

## 2023-02-28 PROCEDURE — 74011000258 HC RX REV CODE- 258: Performed by: INTERNAL MEDICINE

## 2023-02-28 PROCEDURE — 74011250636 HC RX REV CODE- 250/636: Performed by: PHYSICIAN ASSISTANT

## 2023-02-28 PROCEDURE — 86140 C-REACTIVE PROTEIN: CPT

## 2023-02-28 PROCEDURE — 74011250637 HC RX REV CODE- 250/637: Performed by: INTERNAL MEDICINE

## 2023-02-28 PROCEDURE — 36415 COLL VENOUS BLD VENIPUNCTURE: CPT

## 2023-02-28 RX ORDER — ACETAMINOPHEN 325 MG/1
650 TABLET ORAL
Status: DISCONTINUED | OUTPATIENT
Start: 2023-02-28 | End: 2023-03-04 | Stop reason: HOSPADM

## 2023-02-28 RX ADMIN — FUROSEMIDE 20 MG: 10 INJECTION, SOLUTION INTRAMUSCULAR; INTRAVENOUS at 21:10

## 2023-02-28 RX ADMIN — CEFEPIME 2 G: 2 INJECTION, POWDER, FOR SOLUTION INTRAVENOUS at 17:11

## 2023-02-28 RX ADMIN — AZITHROMYCIN MONOHYDRATE 500 MG: 500 INJECTION, POWDER, LYOPHILIZED, FOR SOLUTION INTRAVENOUS at 18:50

## 2023-02-28 RX ADMIN — Medication 10 ML: at 21:10

## 2023-02-28 RX ADMIN — Medication 10 ML: at 17:16

## 2023-02-28 RX ADMIN — ASPIRIN 81 MG CHEWABLE TABLET 81 MG: 81 TABLET CHEWABLE at 09:23

## 2023-02-28 RX ADMIN — ACETAMINOPHEN 650 MG: 325 TABLET ORAL at 18:27

## 2023-02-28 RX ADMIN — POLYETHYLENE GLYCOL 3350 17 G: 17 POWDER, FOR SOLUTION ORAL at 22:28

## 2023-02-28 RX ADMIN — ACETAMINOPHEN 650 MG: 325 TABLET ORAL at 09:23

## 2023-02-28 RX ADMIN — APIXABAN 2.5 MG: 2.5 TABLET, FILM COATED ORAL at 18:27

## 2023-02-28 RX ADMIN — GUAIFENESIN 600 MG: 600 TABLET, EXTENDED RELEASE ORAL at 21:09

## 2023-02-28 RX ADMIN — METOPROLOL TARTRATE 25 MG: 25 TABLET, FILM COATED ORAL at 09:24

## 2023-02-28 RX ADMIN — DEXAMETHASONE 4 MG: 4 TABLET ORAL at 09:24

## 2023-02-28 RX ADMIN — GUAIFENESIN 600 MG: 600 TABLET, EXTENDED RELEASE ORAL at 09:24

## 2023-02-28 RX ADMIN — FUROSEMIDE 20 MG: 10 INJECTION, SOLUTION INTRAMUSCULAR; INTRAVENOUS at 09:24

## 2023-02-28 RX ADMIN — ACETAMINOPHEN 650 MG: 325 TABLET ORAL at 12:54

## 2023-02-28 RX ADMIN — APIXABAN 2.5 MG: 2.5 TABLET, FILM COATED ORAL at 09:24

## 2023-02-28 RX ADMIN — METOPROLOL TARTRATE 25 MG: 25 TABLET, FILM COATED ORAL at 21:09

## 2023-02-28 RX ADMIN — CEFEPIME 2 G: 2 INJECTION, POWDER, FOR SOLUTION INTRAVENOUS at 03:29

## 2023-02-28 RX ADMIN — Medication 10 ML: at 06:43

## 2023-02-28 RX ADMIN — ACETAMINOPHEN 650 MG: 325 TABLET ORAL at 21:10

## 2023-02-28 RX ADMIN — ACETAMINOPHEN 650 MG: 325 TABLET ORAL at 03:29

## 2023-02-28 NOTE — PROGRESS NOTES
Due to Contact Restrictions did not enter the room. Provided prayer at [facility] patient's doorway. Did not include sacramental care.       Waldemar Lopez

## 2023-02-28 NOTE — PROGRESS NOTES
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 Was on paxlovid on outpatient. First + was in early 2023. Still testing positive  CLL with diffuse lymphadenopathy. Followed at [facility]. No on chemo/rxt  A fib. S/p cardioversion on   Minimal smoking hx  Outpatient IgG/IgM low - reconfirming here and IgA as well. Hemonc consulted. Agree with CT scan  CRP remains elevated  Restarted outpatient decadron   On diuretics   On eliquis   Empiric abx   Potassium being repleted as needed  Discussed with family and hospitalist     Guarded prognosis     Daily Progression:    - Thinks her breathing is about the same. Stable on supplemental o2. LE edema much better. Consult Note requested by hospitalist service. Patient was recently hospitalized in early feb for COVID19 +, decom HFpEF and CAP. Was treated with steroids, abx and diuretics ; O2 was started and she was discharged home. Per family was doing well until this past week. Noted increased pedal edema; was on a small amount of diuretics. No weight increased. Had a cardioversion this past Friday. Diuretics were stopped following. Noted progressive worsening dyspnea saturday through . Some reported fevers; WBC was 18.2 on admission, procal normal. Was on steroids at 4mg daily by Dr Arabella Boas as outpatient basis. Had minimal smoking hx. Rennis Ennis still elevated. Imaging on this admission showed worsening infiltrates and small effusions. Discussed with her primary pulmonologist and her outpatient IgG was 160.      I have reviewed the labs and previous days notes. A comprehensive review of systems was negative. Past Medical History:   Diagnosis Date    Arrhythmia     afib    CLL (chronic lymphocytic leukemia) ([facility] [address]     History of chemotherapy     For chronic lymphacytic leukemia    Menopause 1986      Past Surgical History:   Procedure Laterality Date    HX BREAST BIOPSY Bilateral     surgical-benign    HX HYSTERECTOMY  1986    HX OOPHORECTOMY Bilateral 1986    [place] UNLISTED PROCEDURE CARDIAC SURGERY        Prior to Admission medications    Medication Sig Start Date End Date Taking? Authorizing Provider   fenofibrate (LOFIBRA) 160 mg tablet Take 160 mg by mouth daily. Yes Provider, Historical   esomeprazole (NexIUM) 20 mg capsule Take 20 mg by mouth daily. Yes Provider, Historical   cholecalciferol (Vitamin D3) 25 mcg (1,000 unit) cap Take 1,000 Units by mouth daily. Yes Provider, Historical   ascorbic acid, vitamin C, (Vitamin C) 500 mg tablet Take 500 mg by mouth daily. Yes Provider, Historical   apixaban (ELIQUIS) 2.5 mg tablet Take 2.5 mg by mouth two (2) times a day. Yes Provider, Historical   polyethylene glycol (Miralax) 17 gram/dose powder Take 17 g by mouth nightly. Yes Provider, Historical   aluminum hydrox-magnesium carb (Gaviscon)  mg/15 mL suspension Take 15 mL by mouth every six (6) hours as needed for Indigestion. Yes Provider, Historical   aspirin 81 mg chewable tablet Take 1 Tablet by mouth daily. 2/8/23   Mylinda Dakins, MD   guaiFENesin ER (MUCINEX) 600 mg ER tablet Take 1 Tablet by mouth every twelve (12) hours. 2/7/23   Mylinda Dakins, MD   metoprolol tartrate (LOPRESSOR) 25 mg tablet Take 1 Tablet by mouth every twelve (12) hours. 2/8/23   Mylinda Dakins, MD   potassium chloride (K-DUR, KLOR-CON M20) 20 mEq tablet Take 1 Tablet by mouth daily.  2/7/23   Mylinda Dakins, MD     Allergies   Allergen Reactions    Penicillins Anaphylaxis    Ciprofloxacin Myalgia    Singulair [Montelukast] Cough      Social History     Tobacco Use    Smoking status: Never    Smokeless tobacco: Never   Substance Use Topics    Alcohol use: Not on file      No family history on file. OBJECTIVE:     Vital Signs:     Visit Vitals  /68 (BP 1 Location: Right arm, BP Patient Position: At rest)   Pulse 75   Temp 97.7 °F (36.5 °C)   Resp 18   Ht 4' 11\" (1.499 m)   Wt 47.9 kg (105 lb 9.6 oz)   SpO2 94%   BMI 21.33 kg/m²      Temp (24hrs), Av.1 °F (36.7 °C), Min:97.7 °F (36.5 °C), Max:98.9 °F (37.2 °C)     Intake/Output:     Last shift: No intake/output data recorded. Last 3 shifts:  1901 -  0700  In: 940 [P.O.:140;  I.V.:800]  Out: 1881 [Urine:1880]        Intake/Output Summary (Last 24 hours) at 2023 1156  Last data filed at 2023 0329  Gross per 24 hour   Intake 800 ml   Output 1180 ml   Net -380 ml         Physical Exam:                                        Exam Findings Other   General: No resp distress noted, appears stated age    [de-identified]:  No ulcers, JVD not elevated, no cervical LAD    Chest: No pectus deformity, normal chest rise b/l    HEART:  RRR, noted murmur     Lungs:  CTA b/l, no rhonchi/crackles/wheeze, diminished BS at bases    ABD: Soft/NT, non rigid mildly distended    EXT: Mild edema     Skin: No rashes or ulcers, no mottling    Neuro: A/O x 3        Medications:  Current Facility-Administered Medications   Medication Dose Route Frequency    dexAMETHasone (DECADRON) tablet 4 mg  4 mg Oral DAILY    acetaminophen (TYLENOL) tablet 650 mg  650 mg Oral QID    sodium chloride (NS) flush 5-40 mL  5-40 mL IntraVENous Q8H    sodium chloride (NS) flush 5-40 mL  5-40 mL IntraVENous PRN    azithromycin (ZITHROMAX) 500 mg in 0.9% sodium chloride 250 mL (Vcad1Iah)  500 mg IntraVENous Q24H    acetaminophen (TYLENOL) tablet 650 mg  650 mg Oral Q4H PRN    furosemide (LASIX) injection 20 mg  20 mg IntraVENous Q12H    cefepime (MAXIPIME) 2 g in 0.9% sodium chloride (MBP/ADV) 100 mL MBP  2 g IntraVENous Q12H    aspirin chewable tablet 81 mg  81 mg Oral DAILY    guaiFENesin ER (MUCINEX) tablet 600 mg  600 mg Oral Q12H    metoprolol tartrate (LOPRESSOR) tablet 25 mg  25 mg Oral Q12H    polyethylene glycol (MIRALAX) packet 17 g  17 g Oral DAILY PRN    aluminum-magnesium hydroxide (MAALOX) oral suspension 30 mL  30 mL Oral QID PRN    apixaban (ELIQUIS) tablet 2.5 mg  2.5 mg Oral BID       Labs:  ABG Recent Labs     02/26/23  1825   PHI 7.51*   PCO2I 38.1   PO2I 80   HCO3I 30.2*   SO2I 96.8          CBC Recent Labs     02/28/23  0655 02/27/23  0329 02/26/23  1321   WBC 12.3* 13.4* 18.2*   HGB 8.0* 8.5* 8.8*   HCT 24.6* 26.9* 28.9*    292 332   MCV 82.3 83.8 84.3   MCH 26.8 26.5 25.7*          Metabolic  Panel Recent Labs     02/28/23  0655 02/27/23  0329 02/26/23  1321   * 134* 132*   K 3.8 3.3* 4.1    98 96*   CO2 30 31 29   * 92 170*   BUN 14 14 15   CREA 0.40* 0.42* 0.47*   CA 9.0 9.4 9.4   MG  --  2.2 2.1   ALB 2.5*  --  3.0*   ALT 16  --  20          Pertinent Labs                [place], [place]  2/28/2023

## 2023-02-28 NOTE — PROGRESS NOTES
2000 Report received from [facility]. Patient alert and oriented, resting in recliner. No needs expressed. Call bell within reach. Pure wick placed when pt returned to bed.     0735 Bedside shift change report given to Mp Chawla by Enzo Ward RN. Report included the following information SBAR, Kardex, MAR, Accordion, and Recent Results and Cardiac Rhythm NSR. Problem: Risk for Spread of Infection  Goal: Prevent transmission of infectious organism to others  Description: Prevent the transmission of infectious organisms to other patients, staff members, and visitors. Outcome: Progressing Towards Goal     Problem: Falls - Risk of  Goal: *Absence of Falls  Description: Document Paddy Missael Fall Risk and appropriate interventions in the flowsheet.   Outcome: Progressing Towards Goal  Note: Fall Risk Interventions:     Problem: Heart Failure: Day 5  Goal: Diagnostic Test/Procedures  Outcome: Resolved/Met     Problem: Pneumonia: Day 2  Goal: Diagnostic Test/Procedures  Outcome: Progressing Towards Goal

## 2023-02-28 NOTE — CONSULTS
[address]    Name:  Amaury Houston  MR#:  [id number]  :  1933  ACCOUNT #:  [de-identified]  DATE OF SERVICE:      HISTORY OF PRESENT ILLNESS:  The patient is a 80-year-old woman with a history of chronic lymphocytic leukemia and recent COVID pneumonia, who is seen after admission to [facility] on 2023 with worsening shortness of breath. This patient is cared for by Dr. 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 She was treated in 2023 for COVID pneumonia initially with Paxlovid. She was admitted in early 2023 and treated with supplemental oxygen and dexamethasone. She ended up having AFib and required a cardioversion at [facility] on 2023 and was readmitted here with hypoxia on 2023. Since her admission, she has had CT imaging demonstrating pulmonary edema as well as sequelae of COVID pneumonia. She has had improvement in oxygenation and is comfortable at present. Her hemoglobin is 8, white count 12, platelets 768. PAST MEDICAL HISTORY:  Significant for hypercholesterolemia, high blood pressure, reflux esophagitis, benign breast disease, chronic lymphocytic leukemia, redundant colon. PAST SURGICAL HISTORY:  Includes hysterectomy, oophorectomy, breast biopsy. ALLERGIES:  TO PENICILLIN AND CIPRO. FAMILY HISTORY:  Negative. REVIEW OF SYSTEMS:  As noted above, otherwise noncontributory. PHYSICAL EXAMINATION:  GENERAL:  She is a pleasant, chronically ill-appearing woman in no apparent distress. VITAL SIGNS:  Temperature 97, pulse 67, /59, respirations 16, O2 saturation 97% on 2 liters. HEENT:  EOMI. Nonicteric sclerae. NECK:  Supple. LUNGS:  Reveal crackles in the lung bases. CARDIAC:  Regular rate and rhythm. No murmur. ABDOMEN:  Scaphoid, nontender. EXTREMITIES:  No edema.   NEUROPSYCHIATRIC: Grossly intact. LABORATORY DATA:  CBC as noted above. Chemistries reveal normal liver enzymes. Albumin is 2.5. BUN and creatinine are 14 and 0.4. 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 She was originally diagnosed in 2016 and was treated with rituximab in 2018 and again in 2019 and finally again in 2021. The main manifestation of her chronic lymphocytic leukemia has been autoimmune hemolytic anemia and she has been treated with steroid therapy for this and has been tapered down to a maintenance 1 mg prednisone dosage at the time of her recent admission. Her prednisone was stopped at the current admission and she was put on dexamethasone. She is currently on IV antibiotics. Outpatient IgG level was measured at 125 and I recommend that she receive IVIG while she is here to boost her immune system as much as possible. For now, she does not need any other interventions. I will check hemolysis studies to see if she has active hemolysis. We will also check iron and B12 levels and follow with you on behalf of [employer].       MD VINNY Chiang/S_LUIS [id number]  D:  02/28/2023 16:35  T:  02/28/2023 18:17  JOB #:  [id number]

## 2023-02-28 NOTE — PROGRESS NOTES
Problem: Mobility Impaired (Adult and Pediatric)  Goal: *Acute Goals and Plan of Care (Insert Text)  Description: FUNCTIONAL STATUS PRIOR TO ADMISSION: Pt reports using a rolling walker PTA. HOME SUPPORT PRIOR TO ADMISSION: The patient lives with her children and has required increased assistance since hospital admission earlier this month (pt admitted with Claritza in Jan and Feb 2023, required return home with 2L NC at that time; underwent cardioversion 2/24 and now requiring 4L NC). Physical Therapy Goals  Initiated 2/27/2023  1. Patient will move from supine to sit and sit to supine  in bed with modified independence within 7 day(s). 2.  Patient will transfer from bed to chair and chair to bed with supervision/set-up using the least restrictive device within 7 day(s). 3.  Patient will perform sit to stand with supervision/set-up within 7 day(s). 4.  Patient will ambulate with supervision/set-up for 100 feet with the least restrictive device within 7 day(s). 5.  Patient will ascend/descend 3 stairs with bilateral handrail(s) with minimal assistance/contact guard assist within 7 day(s). Outcome: Progressing Towards Goal   PHYSICAL THERAPY TREATMENT  Patient: Clarnce Osler (85 y.o. female)  Date: 2/28/2023  Diagnosis: Hospital-acquired pneumonia [J18.9, Y95] <principal problem not specified>      Precautions: Fall (Droplet+)  Chart, physical therapy assessment, plan of care and goals were reviewed. ASSESSMENT  Patient continues with skilled PT services and is progressing towards goals. Patient received up in chair with family present. Most agreeable to therapy but session limited as transport arrived to take to CT. Moving well with sit to stand and ambulating in room with just contact guard assist.  Still on 4 liters of oxygen and saturations stayed in the low to mid 90's. Gait is safe and steady. Family is assisting with getting to the bathroom and this appropriate. .     Current Level of Function Impacting Discharge (mobility/balance): supervision/contact guard    Other factors to consider for discharge:          PLAN :  Patient continues to benefit from skilled intervention to address the above impairments. Continue treatment per established plan of care. to address goals. Recommendation for discharge: (in order for the patient to meet his/her long term goals)  Physical therapy at least 2 days/week in the home     This discharge recommendation:  Has been made in collaboration with the attending provider and/or case management    IF patient discharges home will need the following DME: none       SUBJECTIVE:   Patient stated I feel okay.     OBJECTIVE DATA SUMMARY:   Critical Behavior:  Neurologic State: Alert  Orientation Level: Oriented X4  Cognition: Follows commands  Safety/Judgement: Awareness of environment  Functional Mobility Training:        Transfers:      Sit <> stand with supervision            Balance:   Standing static -good  Standing dynamic-good  Ambulation/Gait Training:   Light hand hold assist  60 feet   steady       Activity Tolerance:   Fair and desaturates with exertion and requires oxygen    After treatment patient left in no apparent distress: With transport    COMMUNICATION/COLLABORATION:   The patients plan of care was discussed with: Registered nurse.      Pamela Reynoso, PT   Time Calculation: 12 mins

## 2023-02-28 NOTE — PROGRESS NOTES
Pt's daughter-in-law kept her company today. They have asked to keep Dr. Xiao Young (pt's oncologist) in the loop for consults. Dr. Xiao Young, [address]

## 2023-02-28 NOTE — PROGRESS NOTES
[address] [facility] Adult  Hospitalist Group                                                                                          Hospitalist Progress Note  Maura Oliveira MD  Answering service: [phone] OR [phone] from in house phone        Date of Service:  2023  NAME:  Stiven Chaidez  :  1933  MRN:  [id number]      Admission Summary:   80 y.o. female with history of CLL status post chemo, history of COVID infection January-2023, chronic HFpEF was brought in by EMS with shortness of breath, cough/chest congestion, requiring increasing oxygen support. Patient had COVID infection 2023 treated with Paxlovid. She was admitted in 2023 with COVID and discharged with dexamethasone and supplemental O2 with 2 L nasal cannula. Since discharge she has been increasingly more short of breath requiring increased supplemental oxygen use. She had a cardioversion done by her cardiologist at [facility] on  and is now using up to 4 L oxygen at home. Patient had a mild fever of 100.2 earlier today but denies any nausea vomiting chills diarrhea constipation dysuria chest pain recent falls injuries syncope abdominal pains. Daughter also reports that patient's feet and legs have been very swollen. Per EMS, patient was saturating 81% on 4 LNC at home which improved to 94% on 6L on arrival.  CXR in the ED showed worsening bilateral infiltrates and bilateral pleural effusions. The ED she received cefepime 2 g IV x1. EDMD wanted to add levofloxacin 750 mg IV x1 but patient's family refused due to patient's history of tendon rupture.        Interval history / Subjective:   Patient seen and examined- reviewed her vitals ,labs, and careplan  Pulmonary consulted for eval and tx recs     Assessment & Plan:     pneumonia and suspected worsening pulmonary fibrosis related to [place]  Patient was hospitalized 2023 for COVID 19 pneumonia  Cont IV antibiotics, PO steroids, oxygen at 4L; pulmonary consult  CT chest in the AM tomorrow    Acute on chronic HFpEF- pulmonary HTN - likely related to worsening lung disease  elevated BNP cont IV diuresis -Check TTE     H/o CLL- family requesting oncology consult     H/o COVID infection Jan-Feb 2023     Code status: DNR  Prophylaxis: SCDs  Care Plan discussed with: patient and family  Anticipated Disposition: Debra [address] Problems  Never Reviewed            Codes Class Noted POA    Hospital-acquired pneumonia ICD-10-CM: J18.9, Y95  ICD-9-CM: 534  2/26/2023 Unknown             Review of Systems:   A comprehensive review of systems was negative except for that written in the HPI. Vital Signs:    Last 24hrs VS reviewed since prior progress note. Most recent are:  Visit Vitals  BP (!) 120/53 (BP 1 Location: Right arm, BP Patient Position: At rest)   Pulse 82   Temp 97.8 °F (36.6 °C)   Resp 18   Ht 4' 11\" (1.499 m)   Wt 48.1 kg (106 lb)   SpO2 93%   BMI 21.41 kg/m²         Intake/Output Summary (Last 24 hours) at 2/27/2023 2300  Last data filed at 2/27/2023 1115  Gross per 24 hour   Intake 140 ml   Output 701 ml   Net -561 ml        Physical Examination:     I had a face to face encounter with this patient and independently examined them on 2/27/2023 as outlined below:          Constitutional:  No acute distress, cooperative, pleasant    ENT:  Oral mucosa moist, oropharynx benign, O2 via NC. Resp:  bilateral crackles, decreased breath sound    CV:  Regular rhythm, normal rate, no murmurs, gallops, rubs    GI:  Soft, non distended, non tender. normoactive bowel sounds     Musculoskeletal:  No edema, warm, 2+ pulses throughout    Neurologic:  Moves all extremities.   AAOx3, CN II-XII reviewed            Data Review:    Review and/or order of clinical lab test  Review and/or order of tests in the radiology section of CPT  Review and/or order of tests in the medicine section of CPT  CXR Results  (Last 48 hours) 02/26/23 1326  XR CHEST PORT Final result    Impression:  1. Worsening airspace disease and small bilateral pleural effusions       Narrative:  INDICATION:  sob, crackles on the right, concern for acute CHF exacerbation        Exam: Portable chest 1320. Comparison: None. Findings: Cardiomediastinal silhouette is within normal limits. Pulmonary   vasculature is not engorged. There is diffuse patchy airspace disease with upper   lobe predominance. This has progressed. Small pleural effusions. No pneumothorax                      Labs:     Recent Labs     02/27/23  0329 02/26/23  1321   WBC 13.4* 18.2*   HGB 8.5* 8.8*   HCT 26.9* 28.9*    332     Recent Labs     02/27/23  0329 02/26/23  1321   * 132*   K 3.3* 4.1   CL 98 96*   CO2 31 29   BUN 14 15   CREA 0.42* 0.47*   GLU 92 170*   CA 9.4 9.4   MG 2.2 2.1     Recent Labs     02/26/23  1321   ALT 20   AP 77   TBILI 0.8   TP 5.5*   ALB 3.0*   GLOB 2.5     No results for input(s): INR, PTP, APTT, INREXT in the last 72 hours. No results for input(s): FE, TIBC, PSAT, FERR in the last 72 hours. No results found for: FOL, RBCF   No results for input(s): PH, PCO2, PO2 in the last 72 hours. No results for input(s): CPK, CKNDX, TROIQ in the last 72 hours.     No lab exists for component: CPKMB  No results found for: CHOL, CHOLX, CHLST, CHOLV, HDL, HDLP, LDL, LDLC, DLDLP, TGLX, TRIGL, TRIGP, CHHD, CHHDX  No results found for: [facility]  Lab Results   Component Value Date/Time    Color YELLOW/STRAW 02/02/2023 07:56 PM    Appearance CLEAR 02/02/2023 07:56 PM    Specific gravity 1.020 02/02/2023 07:56 PM    pH (UA) 5.5 02/02/2023 07:56 PM    Protein Negative 02/02/2023 07:56 PM    Glucose Negative 02/02/2023 07:56 PM    Ketone Negative 02/02/2023 07:56 PM    Bilirubin Negative 02/02/2023 07:56 PM    Urobilinogen 1.0 02/02/2023 07:56 PM    Nitrites Negative 02/02/2023 07:56 PM    Leukocyte Esterase SMALL (A) 02/02/2023 07:56 PM    Epithelial cells MODERATE (A) 02/02/2023 07:56 PM    Bacteria Negative 02/02/2023 07:56 PM    WBC 10-20 02/02/2023 07:56 PM    RBC 0-5 02/02/2023 07:56 PM         Medications Reviewed:     Current Facility-Administered Medications   Medication Dose Route Frequency    dexAMETHasone (DECADRON) tablet 4 mg  4 mg Oral DAILY    acetaminophen (TYLENOL) tablet 650 mg  650 mg Oral QID    sodium chloride (NS) flush 5-40 mL  5-40 mL IntraVENous Q8H    sodium chloride (NS) flush 5-40 mL  5-40 mL IntraVENous PRN    azithromycin (ZITHROMAX) 500 mg in 0.9% sodium chloride 250 mL ([id number])  500 mg IntraVENous Q24H    acetaminophen (TYLENOL) tablet 650 mg  650 mg Oral Q4H PRN    furosemide (LASIX) injection 20 mg  20 mg IntraVENous Q12H    cefepime (MAXIPIME) 2 g in 0.9% sodium chloride (MBP/ADV) 100 mL MBP  2 g IntraVENous Q12H    aspirin chewable tablet 81 mg  81 mg Oral DAILY    guaiFENesin ER (MUCINEX) tablet 600 mg  600 mg Oral Q12H    metoprolol tartrate (LOPRESSOR) tablet 25 mg  25 mg Oral Q12H    polyethylene glycol (MIRALAX) packet 17 g  17 g Oral DAILY PRN    aluminum-magnesium hydroxide (MAALOX) oral suspension 30 mL  30 mL Oral QID PRN    apixaban (ELIQUIS) tablet 2.5 mg  2.5 mg Oral BID     ______________________________________________________________________  EXPECTED LENGTH OF STAY: 5d 4h  ACTUAL LENGTH OF STAY:          1                 Kayla Reis MD

## 2023-03-01 LAB
BNP SERPL-MCNC: 742 PG/ML
CRP SERPL-MCNC: 8.78 MG/DL (ref 0–0.6)
ERYTHROCYTE [DISTWIDTH] IN BLOOD BY AUTOMATED COUNT: 16.5 % (ref 11.5–14.5)
HAPTOGLOB SERPL-MCNC: 355 MG/DL (ref 30–200)
HCT VFR BLD AUTO: 27.8 % (ref 35–47)
HGB BLD-MCNC: 8.7 G/DL (ref 11.5–16)
IGA SERPL-MCNC: 15 MG/DL (ref 64–422)
IGA1 SER-MCNC: 12.7 MG/DL (ref 73.2–301.2)
IGA2 SER-MCNC: 1.3 MG/DL (ref 13.4–97.9)
IGG SERPL-MCNC: 98 MG/DL (ref 586–1602)
IGG1 SER-MCNC: 62 MG/DL (ref 248–810)
IGG2 SER-MCNC: 14 MG/DL (ref 130–555)
IGG3 SER-MCNC: 6 MG/DL (ref 15–102)
IGG4 SER-MCNC: 1 MG/DL (ref 2–96)
IRON SATN MFR SERPL: 11 % (ref 20–50)
IRON SERPL-MCNC: 28 UG/DL (ref 35–150)
LDH SERPL L TO P-CCNC: 320 U/L (ref 81–246)
MCH RBC QN AUTO: 26.6 PG (ref 26–34)
MCHC RBC AUTO-ENTMCNC: 31.3 G/DL (ref 30–36.5)
MCV RBC AUTO: 85 FL (ref 80–99)
NRBC # BLD: 0 K/UL (ref 0–0.01)
NRBC BLD-RTO: 0 PER 100 WBC
PLATELET # BLD AUTO: 337 K/UL (ref 150–400)
PMV BLD AUTO: 8.9 FL (ref 8.9–12.9)
RBC # BLD AUTO: 3.27 M/UL (ref 3.8–5.2)
TIBC SERPL-MCNC: 247 UG/DL (ref 250–450)
WBC # BLD AUTO: 16.9 K/UL (ref 3.6–11)

## 2023-03-01 PROCEDURE — 65270000046 HC RM TELEMETRY

## 2023-03-01 PROCEDURE — 83615 LACTATE (LD) (LDH) ENZYME: CPT

## 2023-03-01 PROCEDURE — 83880 ASSAY OF NATRIURETIC PEPTIDE: CPT

## 2023-03-01 PROCEDURE — 74011250636 HC RX REV CODE- 250/636: Performed by: PHYSICIAN ASSISTANT

## 2023-03-01 PROCEDURE — 74011000258 HC RX REV CODE- 258: Performed by: INTERNAL MEDICINE

## 2023-03-01 PROCEDURE — 74011000250 HC RX REV CODE- 250: Performed by: INTERNAL MEDICINE

## 2023-03-01 PROCEDURE — 36415 COLL VENOUS BLD VENIPUNCTURE: CPT

## 2023-03-01 PROCEDURE — 74011250637 HC RX REV CODE- 250/637: Performed by: INTERNAL MEDICINE

## 2023-03-01 PROCEDURE — 74011250637 HC RX REV CODE- 250/637

## 2023-03-01 PROCEDURE — 74011250636 HC RX REV CODE- 250/636: Performed by: INTERNAL MEDICINE

## 2023-03-01 PROCEDURE — 86140 C-REACTIVE PROTEIN: CPT

## 2023-03-01 PROCEDURE — 83010 ASSAY OF HAPTOGLOBIN QUANT: CPT

## 2023-03-01 PROCEDURE — 83540 ASSAY OF IRON: CPT

## 2023-03-01 PROCEDURE — 74011250637 HC RX REV CODE- 250/637: Performed by: FAMILY MEDICINE

## 2023-03-01 PROCEDURE — 85027 COMPLETE CBC AUTOMATED: CPT

## 2023-03-01 RX ORDER — FUROSEMIDE 20 MG/1
20 TABLET ORAL DAILY
Status: DISCONTINUED | OUTPATIENT
Start: 2023-03-02 | End: 2023-03-04 | Stop reason: HOSPADM

## 2023-03-01 RX ORDER — ACYCLOVIR 200 MG/1
400 CAPSULE ORAL 4 TIMES DAILY
Status: DISCONTINUED | OUTPATIENT
Start: 2023-03-01 | End: 2023-03-04 | Stop reason: HOSPADM

## 2023-03-01 RX ADMIN — CEFEPIME 2 G: 2 INJECTION, POWDER, FOR SOLUTION INTRAVENOUS at 03:15

## 2023-03-01 RX ADMIN — ACYCLOVIR 400 MG: 200 CAPSULE ORAL at 20:56

## 2023-03-01 RX ADMIN — APIXABAN 2.5 MG: 2.5 TABLET, FILM COATED ORAL at 10:28

## 2023-03-01 RX ADMIN — ACYCLOVIR 400 MG: 200 CAPSULE ORAL at 18:22

## 2023-03-01 RX ADMIN — Medication 10 ML: at 21:03

## 2023-03-01 RX ADMIN — AZITHROMYCIN MONOHYDRATE 500 MG: 500 INJECTION, POWDER, LYOPHILIZED, FOR SOLUTION INTRAVENOUS at 18:18

## 2023-03-01 RX ADMIN — APIXABAN 2.5 MG: 2.5 TABLET, FILM COATED ORAL at 18:20

## 2023-03-01 RX ADMIN — ACETAMINOPHEN 650 MG: 325 TABLET ORAL at 10:28

## 2023-03-01 RX ADMIN — ACETAMINOPHEN 650 MG: 325 TABLET ORAL at 14:38

## 2023-03-01 RX ADMIN — GUAIFENESIN 600 MG: 600 TABLET, EXTENDED RELEASE ORAL at 20:44

## 2023-03-01 RX ADMIN — IMMUNE GLOBULIN (HUMAN) 20 G: 10 INJECTION INTRAVENOUS; SUBCUTANEOUS at 12:05

## 2023-03-01 RX ADMIN — METOPROLOL TARTRATE 25 MG: 25 TABLET, FILM COATED ORAL at 10:28

## 2023-03-01 RX ADMIN — ACETAMINOPHEN 650 MG: 325 TABLET ORAL at 18:20

## 2023-03-01 RX ADMIN — DEXAMETHASONE 4 MG: 4 TABLET ORAL at 10:28

## 2023-03-01 RX ADMIN — ASPIRIN 81 MG CHEWABLE TABLET 81 MG: 81 TABLET CHEWABLE at 10:28

## 2023-03-01 RX ADMIN — FUROSEMIDE 20 MG: 10 INJECTION, SOLUTION INTRAMUSCULAR; INTRAVENOUS at 10:29

## 2023-03-01 RX ADMIN — Medication 10 ML: at 05:10

## 2023-03-01 RX ADMIN — CEFEPIME 2 G: 2 INJECTION, POWDER, FOR SOLUTION INTRAVENOUS at 16:02

## 2023-03-01 RX ADMIN — ACETAMINOPHEN 650 MG: 325 TABLET ORAL at 23:10

## 2023-03-01 RX ADMIN — Medication 10 ML: at 14:35

## 2023-03-01 RX ADMIN — METOPROLOL TARTRATE 25 MG: 25 TABLET, FILM COATED ORAL at 20:56

## 2023-03-01 RX ADMIN — GUAIFENESIN 600 MG: 600 TABLET, EXTENDED RELEASE ORAL at 20:56

## 2023-03-01 RX ADMIN — GUAIFENESIN 600 MG: 600 TABLET, EXTENDED RELEASE ORAL at 10:28

## 2023-03-01 RX ADMIN — POLYETHYLENE GLYCOL 3350 17 G: 17 POWDER, FOR SOLUTION ORAL at 21:01

## 2023-03-01 NOTE — PROGRESS NOTES
Pulmonary, Critical Care, and Sleep Medicine~progress Note    Name: Waqar Armstrong MRN: [id number]   : 1933 Hospital: 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 Was on paxlovid on outpatient. First + was in early 2023. Still testing positive  CLL with diffuse lymphadenopathy. Follow[facility] at [facility]. No on chemo/rxt  A fib. S/p cardioversion on   Minimal smoking hx  Appreciate VCI, starting IVIG  Continue decadron   On diuretics   On eliquis   Empiric abx   We will have her follow up with Dr Karen Huynh in 1 wk   Discussed with family and hospitalist; family requesting ECHO      Daily Progression:    3/1  Noted CT scan from yesterday; would continue to monitor     - Thinks her breathing is about the same. Stable on supplemental o2. LE edema much better. Consult Note requested by hospitalist service. Patient was recently hospitalized in early feb for COVID19 +, decom HFpEF and CAP. Was treated with steroids, abx and diuretics ; O2 was started and she was discharged home. Per family was doing well until this past week. Noted increased pedal edema; was on a small amount of diuretics. No weight increased. Had a cardioversion this past Friday. Diuretics were stopped following. Noted progressive worsening dyspnea saturday through . Some reported fevers; WBC was 18.2 on admission, procal normal. Was on steroids at 4mg daily by Dr Karen Huynh as outpatient basis. Had minimal smoking hx. Sharene Basil still elevated. Imaging on this admission showed worsening infiltrates and small effusions. Discussed with her primary pulmonologist and her outpatient IgG was 160.      I have reviewed the labs and previous days notes. A comprehensive review of systems was negative. Past Medical History:   Diagnosis Date    Arrhythmia     afib    CLL (chronic lymphocytic leukemia) ([facility] [address]     History of chemotherapy     For chronic lymphacytic leukemia    Menopause 1986      Past Surgical History:   Procedure Laterality Date    HX BREAST BIOPSY Bilateral     surgical-benign    HX HYSTERECTOMY  1986    HX OOPHORECTOMY Bilateral 1986    TX UNLISTED PROCEDURE CARDIAC SURGERY        Prior to Admission medications    Medication Sig Start Date End Date Taking? Authorizing Provider   fenofibrate (LOFIBRA) 160 mg tablet Take 160 mg by mouth daily. Yes Provider, Historical   esomeprazole (NexIUM) 20 mg capsule Take 20 mg by mouth daily. Yes Provider, Historical   cholecalciferol (Vitamin D3) 25 mcg (1,000 unit) cap Take 1,000 Units by mouth daily. Yes Provider, Historical   ascorbic acid, vitamin C, (Vitamin C) 500 mg tablet Take 500 mg by mouth daily. Yes Provider, Historical   apixaban (ELIQUIS) 2.5 mg tablet Take 2.5 mg by mouth two (2) times a day. Yes Provider, Historical   polyethylene glycol (Miralax) 17 gram/dose powder Take 17 g by mouth nightly. Yes Provider, Historical   aluminum hydrox-magnesium carb (Gaviscon)  mg/15 mL suspension Take 15 mL by mouth every six (6) hours as needed for Indigestion. Yes Provider, Historical   aspirin 81 mg chewable tablet Take 1 Tablet by mouth daily. 2/8/23   Jared St MD   guaiFENesin ER (MUCINEX) 600 mg ER tablet Take 1 Tablet by mouth every twelve (12) hours. 2/7/23   Jared St MD   metoprolol tartrate (LOPRESSOR) 25 mg tablet Take 1 Tablet by mouth every twelve (12) hours. 2/8/23   Jared St MD   potassium chloride (K-DUR, KLOR-CON M20) 20 mEq tablet Take 1 Tablet by mouth daily.  2/7/23   Jared St MD     Allergies   Allergen Reactions    Penicillins Anaphylaxis    Ciprofloxacin Myalgia    Singulair [Montelukast] Cough Social History     Tobacco Use    Smoking status: Never    Smokeless tobacco: Never   Substance Use Topics    Alcohol use: Not on file      No family history on file. OBJECTIVE:     Vital Signs:     Visit Vitals  BP (!) 122/47   Pulse 69   Temp 98.7 °F (37.1 °C)   Resp 24   Ht 4' 11\" (1.499 m)   Wt 45.7 kg (100 lb 12 oz)   SpO2 96%   BMI 20.35 kg/m²      Temp (24hrs), Av.9 °F (36.6 °C), Min:97.3 °F (36.3 °C), Max:98.7 °F (37.1 °C)     Intake/Output:     Last shift: No intake/output data recorded.     Last 3 shifts:  1901 -  0700  In: 800 [I.V.:800]  Out: 2780 [Urine:2780]        Intake/Output Summary (Last 24 hours) at 3/1/2023 1305  Last data filed at 3/1/2023 0321  Gross per 24 hour   Intake --   Output 1600 ml   Net -1600 ml         Physical Exam:                                        Exam Findings Other   General: No resp distress noted, appears stated age    [de-identified]:  No ulcers, JVD not elevated, no cervical LAD    Chest: No pectus deformity, normal chest rise b/l    HEART:  RRR, noted murmur     Lungs:  CTA b/l, no rhonchi/crackles/wheeze, diminished BS at bases    ABD: Soft/NT, non rigid mildly distended    EXT: Mild edema     Skin: No rashes or ulcers, no mottling    Neuro: A/O x 3        Medications:  Current Facility-Administered Medications   Medication Dose Route Frequency    immune globulin 10% (GAMUNEX-C) infusion 20 g  20 g IntraVENous DAILY    acetaminophen (TYLENOL) tablet 650 mg  650 mg Oral Q4H PRN    dexAMETHasone (DECADRON) tablet 4 mg  4 mg Oral DAILY    acetaminophen (TYLENOL) tablet 650 mg  650 mg Oral QID    sodium chloride (NS) flush 5-40 mL  5-40 mL IntraVENous Q8H    sodium chloride (NS) flush 5-40 mL  5-40 mL IntraVENous PRN    azithromycin (ZITHROMAX) 500 mg in 0.9% sodium chloride 250 mL ([id number])  500 mg IntraVENous Q24H    acetaminophen (TYLENOL) tablet 650 mg  650 mg Oral Q4H PRN    furosemide (LASIX) injection 20 mg  20 mg IntraVENous Q12H    cefepime (MAXIPIME) 2 g in 0.9% sodium chloride (MBP/ADV) 100 mL MBP  2 g IntraVENous Q12H    aspirin chewable tablet 81 mg  81 mg Oral DAILY    guaiFENesin ER (MUCINEX) tablet 600 mg  600 mg Oral Q12H    metoprolol tartrate (LOPRESSOR) tablet 25 mg  25 mg Oral Q12H    polyethylene glycol (MIRALAX) packet 17 g  17 g Oral DAILY PRN    aluminum-magnesium hydroxide (MAALOX) oral suspension 30 mL  30 mL Oral QID PRN    apixaban (ELIQUIS) tablet 2.5 mg  2.5 mg Oral BID       Labs:  ABG Recent Labs     02/26/23  1825   PHI 7.51*   PCO2I 38.1   PO2I 80   HCO3I 30.2*   SO2I 96.8          CBC Recent Labs     03/01/23  0329 02/28/23  0655 02/27/23  0329   WBC 16.9* 12.3* 13.4*   HGB 8.7* 8.0* 8.5*   HCT 27.8* 24.6* 26.9*    271 292   MCV 85.0 82.3 83.8   MCH 26.6 26.8 27.1          Metabolic  Panel Recent Labs     02/28/23  0655 02/27/23  0329 02/26/23  1321   * 134* 132*   K 3.8 3.3* 4.1    98 96*   CO2 30 31 29   * 92 170*   BUN 14 14 15   CREA 0.40* 0.42* 0.47*   CA 9.0 9.4 9.4   MG  --  2.2 2.1   ALB 2.5*  --  3.0*   ALT 16  --  20          Pertinent Labs                Alexander Cyr PA-C  3/1/2023

## 2023-03-01 NOTE — PROGRESS NOTES
[address] visit attempted. Mrs. Sami De Santiago is unable to receive communion due to medical restrictions. Prayer for spiritual communion offered outside her  room.     CASTRO Jay, RN, ACSW, LCSW   Page:  [phone]

## 2023-03-01 NOTE — PROGRESS NOTES
Problem: Risk for Spread of Infection  Goal: Prevent transmission of infectious organism to others  Description: Prevent the transmission of infectious organisms to other patients, staff members, and visitors. Outcome: Progressing Towards Goal     Problem: Patient Education:  Go to Education Activity  Goal: Patient/Family Education  Outcome: Progressing Towards Goal     Problem: Falls - Risk of  Goal: *Absence of Falls  Description: Document Conception Brawn Fall Risk and appropriate interventions in the flowsheet.   Outcome: Progressing Towards Goal  Note: Fall Risk Interventions:  Mobility Interventions: OT consult for ADLs, Patient to call before getting OOB, PT Consult for mobility concerns, Utilize walker, cane, or other assistive device, Utilize gait belt for transfers/ambulation, PT Consult for assist device competence         Medication Interventions: Bed/chair exit alarm, Evaluate medications/consider consulting pharmacy, Teach patient to arise slowly, Patient to call before getting OOB, Utilize gait belt for transfers/ambulation    Elimination Interventions: Patient to call for help with toileting needs, Call light in reach, Bed/chair exit alarm, Elevated toilet seat, Stay With Me (per policy), Toilet paper/wipes in reach, Toileting schedule/hourly rounds              Problem: Patient Education: Go to Patient Education Activity  Goal: Patient/Family Education  Outcome: Progressing Towards Goal     Problem: Patient Education: Go to Patient Education Activity  Goal: Patient/Family Education  Outcome: Progressing Towards Goal     Problem: Patient Education: Go to Patient Education Activity  Goal: Patient/Family Education  Outcome: Progressing Towards Goal     Problem: Patient Education: Go to Patient Education Activity  Goal: Patient/Family Education  Outcome: Progressing Towards Goal     Problem: Heart Failure: Day 2  Goal: Activity/Safety  Outcome: Progressing Towards Goal     Problem: Patient Education: Go to Patient Education Activity  Goal: Patient/Family Education  Outcome: Progressing Towards Goal     Problem: Pneumonia: Day 1  Goal: Off Pathway (Use only if patient is Off Pathway)  Outcome: Progressing Towards Goal     Problem: Heart Failure: Day 3  Goal: Off Pathway (Use only if patient is Off Pathway)  Outcome: Progressing Towards Goal  Goal: Activity/Safety  Outcome: Progressing Towards Goal  Goal: Diagnostic Test/Procedures  Outcome: Progressing Towards Goal  Goal: Nutrition/Diet  Outcome: Progressing Towards Goal  Goal: Discharge Planning  Outcome: Progressing Towards Goal  Goal: Medications  Outcome: Progressing Towards Goal  Goal: Respiratory  Outcome: Progressing Towards Goal  Goal: Treatments/Interventions/Procedures  Outcome: Progressing Towards Goal  Goal: Psychosocial  Outcome: Progressing Towards Goal  Goal: *Oxygen saturation within defined limits  Outcome: Progressing Towards Goal  Goal: *Hemodynamically stable  Outcome: Progressing Towards Goal  Goal: *Optimal pain control at patient's stated goal  Outcome: Progressing Towards Goal  Goal: *Anxiety reduced or absent  Outcome: Progressing Towards Goal  Goal: *Demonstrates progressive activity  Outcome: Progressing Towards Goal     Problem: Heart Failure: Day 4  Goal: Off Pathway (Use only if patient is Off Pathway)  Outcome: Progressing Towards Goal  Goal: Activity/Safety  Outcome: Progressing Towards Goal  Goal: Diagnostic Test/Procedures  Outcome: Progressing Towards Goal  Goal: Nutrition/Diet  Outcome: Progressing Towards Goal  Goal: Discharge Planning  Outcome: Progressing Towards Goal  Goal: Medications  Outcome: Progressing Towards Goal  Goal: Respiratory  Outcome: Progressing Towards Goal  Goal: Treatments/Interventions/Procedures  Outcome: Progressing Towards Goal  Goal: Psychosocial  Outcome: Progressing Towards Goal  Goal: *Oxygen saturation within defined limits  Outcome: Progressing Towards Goal  Goal: *Hemodynamically stable  Outcome: Progressing Towards Goal  Goal: *Optimal pain control at patient's stated goal  Outcome: Progressing Towards Goal  Goal: *Anxiety reduced or absent  Outcome: Progressing Towards Goal  Goal: *Demonstrates progressive activity  Outcome: Progressing Towards Goal     Problem: Heart Failure: Day 5  Goal: Off Pathway (Use only if patient is Off Pathway)  Outcome: Progressing Towards Goal  Goal: Activity/Safety  Outcome: Progressing Towards Goal  Goal: Diagnostic Test/Procedures  Outcome: Progressing Towards Goal  Goal: Nutrition/Diet  Outcome: Progressing Towards Goal  Goal: Discharge Planning  Outcome: Progressing Towards Goal  Goal: Medications  Outcome: Progressing Towards Goal  Goal: Respiratory  Outcome: Progressing Towards Goal  Goal: Treatments/Interventions/Procedures  Outcome: Progressing Towards Goal  Goal: Psychosocial  Outcome: Progressing Towards Goal     Problem: Heart Failure: Discharge Outcomes  Goal: *Demonstrates ability to perform prescribed activity without shortness of breath or discomfort  Outcome: Progressing Towards Goal  Goal: *Left ventricular function assessment completed prior to or during stay, or planned for post-discharge  Outcome: Progressing Towards Goal  Goal: *ACEI prescribed if LVEF less than 40% and no contraindications or ARB prescribed  Outcome: Progressing Towards Goal  Goal: *Verbalizes understanding and describes prescribed diet  Outcome: Progressing Towards Goal  Goal: *Verbalizes understanding/describes prescribed medications  Outcome: Progressing Towards Goal  Goal: *Describes available resources and support systems  Description: (eg: Home Health, Palliative Care, Advanced Medical Directive)  Outcome: Progressing Towards Goal  Goal: *Describes smoking cessation resources  Outcome: Progressing Towards Goal  Goal: *Understands and describes signs and symptoms to report to providers(Stroke Metric)  Outcome: Progressing Towards Goal  Goal: *Describes/verbalizes understanding of follow-up/return appt  Description: (eg: to physicians, diabetes treatment coordinator, and other resources  Outcome: Progressing Towards Goal  Goal: *Describes importance of continuing daily weights and changes to report to physician  Outcome: Progressing Towards Goal

## 2023-03-01 NOTE — PROGRESS NOTES
[address] [facility] Adult  Hospitalist Group                                                                                          Hospitalist Progress Note  Remy Meade MD  Answering service: [phone] OR [phone] from in house phone        Date of Service:  2023  NAME:  Berlin Sandoval  :  1933  MRN:  [id number]      Admission Summary:   80 y.o. female with history of CLL status post chemo, history of COVID infection January-2023, chronic HFpEF was brought in by EMS with shortness of breath, cough/chest congestion, requiring increasing oxygen support. Patient had COVID infection 2023 treated with Paxlovid. She was admitted in 2023 with COVID and discharged with dexamethasone and supplemental O2 with 2 L nasal cannula. Since discharge she has been increasingly more short of breath requiring increased supplemental oxygen use. She had a cardioversion done by her cardiologist at [facility] on  and is now using up to 4 L oxygen at home. Patient had a mild fever of 100.2 earlier today but denies any nausea vomiting chills diarrhea constipation dysuria chest pain recent falls injuries syncope abdominal pains. Daughter also reports that patient's feet and legs have been very swollen. Per EMS, patient was saturating 81% on 4 LNC at home which improved to 94% on 6L on arrival.  CXR in the ED showed worsening bilateral infiltrates and bilateral pleural effusions. The ED she received cefepime 2 g IV x1. EDMD wanted to add levofloxacin 750 mg IV x1 but patient's family refused due to patient's history of tendon rupture.        Interval history / Subjective:   Patient seen and examined- reviewed her vitals ,labs, and careplan  Pulmonary consulted for eval and tx recs     Assessment & Plan:     pneumonia and suspected worsening pulmonary fibrosis related to [place]  Patient was hospitalized 2023 for COVID 19 pneumonia  Cont IV antibiotics, PO steroids, oxygen at 3L; pulmonary consult  CT chest done today and reviewed with family  Starting IgG tomorrow    Acute on chronic HFpEF- pulmonary HTN - likely related to worsening lung disease  elevated BNP cont IV diuresis -Check TTE     H/o CLL- family requesting oncology consult  Patient seen by Dr Cheo Stern Been noted     H/o COVID infection Jan-Feb 2023     Code status: DNR  Prophylaxis: SCDs  Care Plan discussed with: patient and family  Anticipated Disposition: [place] [address] Problems  Never Reviewed            Codes Class Noted POA    Hospital-acquired pneumonia ICD-10-CM: J18.9, Y95  ICD-9-CM: 161  2/26/2023 Unknown           Review of Systems:   A comprehensive review of systems was negative except for that written in the HPI. Vital Signs:    Last 24hrs VS reviewed since prior progress note.  Most recent are:  Visit Vitals  /63 (BP 1 Location: Left upper arm, BP Patient Position: At rest;Lying)   Pulse 61   Temp 97.6 °F (36.4 °C)   Resp 16   Ht 4' 11\" (1.499 m)   Wt 47.9 kg (105 lb 9.6 oz)   SpO2 95%   BMI 21.33 kg/m²     Patient Vitals for the past 24 hrs:   Temp Pulse Resp BP SpO2   02/28/23 2203 -- 61 -- -- --   02/28/23 2113 97.6 °F (36.4 °C) 73 16 137/63 95 %   02/28/23 2000 -- 72 -- -- --   02/28/23 1800 -- 72 -- -- --   02/28/23 1429 97.5 °F (36.4 °C) 67 16 (!) 111/59 97 %   02/28/23 1218 -- 64 -- -- --   02/28/23 1021 -- 75 -- -- --   02/28/23 0917 97.7 °F (36.5 °C) 76 18 132/68 94 %   02/28/23 0606 -- 62 -- -- --   02/28/23 0400 -- 64 -- -- --   02/28/23 0329 98.3 °F (36.8 °C) 74 18 128/62 98 %   02/28/23 0200 -- 65 -- -- --        Intake/Output Summary (Last 24 hours) at 2/28/2023 2241  Last data filed at 2/28/2023 0329  Gross per 24 hour   Intake 100 ml   Output 1030 ml   Net -930 ml          Physical Examination:     I had a face to face encounter with this patient and independently examined them on 2/28/2023 as outlined below:          Constitutional:  No acute distress, cooperative, pleasant    ENT:  Oral mucosa moist, oropharynx benign, O2 via NC. Resp:  bilateral crackles, decreased breath sound    CV:  Regular rhythm, normal rate, no murmurs, gallops, rubs    GI:  Soft, non distended, non tender. normoactive bowel sounds     Musculoskeletal:  No edema, warm, 2+ pulses throughout    Neurologic:  Moves all extremities. AAOx3, CN II-XII reviewed            Data Review:    Review and/or order of clinical lab test  Review and/or order of tests in the radiology section of CPT  Review and/or order of tests in the medicine section of CPT  CXR Results  (Last 48 hours)      None               Labs:     Recent Labs     02/28/23  0655 02/27/23  0329   WBC 12.3* 13.4*   HGB 8.0* 8.5*   HCT 24.6* 26.9*    292       Recent Labs     02/28/23  0655 02/27/23  0329 02/26/23  1321   * 134* 132*   K 3.8 3.3* 4.1    98 96*   CO2 30 31 29   BUN 14 14 15   CREA 0.40* 0.42* 0.47*   * 92 170*   CA 9.0 9.4 9.4   MG  --  2.2 2.1       Recent Labs     02/28/23  0655 02/26/23  1321   ALT 16 20   AP 68 77   TBILI 0.7 0.8   TP 5.3* 5.5*   ALB 2.5* 3.0*   GLOB 2.8 2.5       No results for input(s): INR, PTP, APTT, INREXT, INREXT in the last 72 hours. No results for input(s): FE, TIBC, PSAT, FERR in the last 72 hours. No results found for: FOL, RBCF   No results for input(s): PH, PCO2, PO2 in the last 72 hours. No results for input(s): CPK, CKNDX, TROIQ in the last 72 hours.     No lab exists for component: CPKMB  No results found for: CHOL, CHOLX, CHLST, CHOLV, HDL, HDLP, LDL, LDLC, DLDLP, TGLX, TRIGL, TRIGP, CHHD, CHHDX  No results found for: [facility]  Lab Results   Component Value Date/Time    Color YELLOW/STRAW 02/02/2023 07:56 PM    Appearance CLEAR 02/02/2023 07:56 PM    Specific gravity 1.020 02/02/2023 07:56 PM    pH (UA) 5.5 02/02/2023 07:56 PM    Protein Negative 02/02/2023 07:56 PM    Glucose Negative 02/02/2023 07:56 PM    Ketone Negative 02/02/2023 07:56 PM Bilirubin Negative 02/02/2023 07:56 PM    Urobilinogen 1.0 02/02/2023 07:56 PM    Nitrites Negative 02/02/2023 07:56 PM    Leukocyte Esterase SMALL (A) 02/02/2023 07:56 PM    Epithelial cells MODERATE (A) 02/02/2023 07:56 PM    Bacteria Negative 02/02/2023 07:56 PM    WBC 10-20 02/02/2023 07:56 PM    RBC 0-5 02/02/2023 07:56 PM         Medications Reviewed:     Current Facility-Administered Medications   Medication Dose Route Frequency    [START ON 3/1/2023] immune globulin 10% (GAMUNEX-C) infusion 20 g  20 g IntraVENous DAILY    acetaminophen (TYLENOL) tablet 650 mg  650 mg Oral Q4H PRN    dexAMETHasone (DECADRON) tablet 4 mg  4 mg Oral DAILY    acetaminophen (TYLENOL) tablet 650 mg  650 mg Oral QID    sodium chloride (NS) flush 5-40 mL  5-40 mL IntraVENous Q8H    sodium chloride (NS) flush 5-40 mL  5-40 mL IntraVENous PRN    azithromycin (ZITHROMAX) 500 mg in 0.9% sodium chloride 250 mL ([id number])  500 mg IntraVENous Q24H    acetaminophen (TYLENOL) tablet 650 mg  650 mg Oral Q4H PRN    furosemide (LASIX) injection 20 mg  20 mg IntraVENous Q12H    cefepime (MAXIPIME) 2 g in 0.9% sodium chloride (MBP/ADV) 100 mL MBP  2 g IntraVENous Q12H    aspirin chewable tablet 81 mg  81 mg Oral DAILY    guaiFENesin ER (MUCINEX) tablet 600 mg  600 mg Oral Q12H    metoprolol tartrate (LOPRESSOR) tablet 25 mg  25 mg Oral Q12H    polyethylene glycol (MIRALAX) packet 17 g  17 g Oral DAILY PRN    aluminum-magnesium hydroxide (MAALOX) oral suspension 30 mL  30 mL Oral QID PRN    apixaban (ELIQUIS) tablet 2.5 mg  2.5 mg Oral BID     ______________________________________________________________________  EXPECTED LENGTH OF STAY: 5d 4h  ACTUAL LENGTH OF STAY:          2                 Darryle Boroughs, MD

## 2023-03-01 NOTE — PROGRESS NOTES
I had to go to several floors to find a 8210 mask. I did visit with Moises [address] and her relative who was with her. I celebrated with Moises Way the [address] and gave them Communion.   Father Doc Thorne

## 2023-03-01 NOTE — CONSULTS
[address]    Name:  Aixa Paige  MR#:  [id number]  :  1933  ACCOUNT #:  [de-identified]  DATE OF SERVICE:      HISTORY OF PRESENT ILLNESS:  The patient is a 25-year-old woman with a history of chronic lymphocytic leukemia and recent COVID pneumonia, who is seen after admission to [facility] on 2023 with worsening shortness of breath. This patient is cared for by Dr. 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 She was treated in 2023 for COVID pneumonia initially with Paxlovid. She was admitted in early 2023 and treated with supplemental oxygen and dexamethasone. She ended up having AFib and required a cardioversion at [facility] on 2023 and was readmitted here with hypoxia on 2023. Since her admission, she has had CT imaging demonstrating pulmonary edema as well as sequelae of COVID pneumonia. She has had improvement in oxygenation and is comfortable at present. Her hemoglobin is 8, white count 12, platelets 752.    7/49  -rbm  No major complaints today. Walked in room. PAST MEDICAL HISTORY:  Significant for hypercholesterolemia, high blood pressure, reflux esophagitis, benign breast disease, chronic lymphocytic leukemia, redundant colon. PAST SURGICAL HISTORY:  Includes hysterectomy, oophorectomy, breast biopsy. ALLERGIES:  TO PENICILLIN AND CIPRO. FAMILY HISTORY:  Negative. REVIEW OF SYSTEMS:  As noted above, otherwise noncontributory. PHYSICAL EXAMINATION:  GENERAL:  She is a pleasant, chronically ill-appearing woman in no apparent distress. VITAL SIGNS:  Temperature 97, pulse 67, /59, respirations 16, O2 saturation 97% on 2 liters. HEENT:  EOMI. Nonicteric sclerae. NECK:  Supple. LUNGS:  Reveal crackles in the lung bases. CARDIAC:  Regular rate and rhythm. No murmur.   ABDOMEN: Scaphoid, nontender. EXTREMITIES:  No edema. NEUROPSYCHIATRIC:  Grossly intact. LABORATORY DATA:  CBC as noted above. Chemistries reveal normal liver enzymes. Albumin is 2.5. BUN and creatinine are 14 and 0.4. 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 She was originally diagnosed in 2016 and was treated with rituximab in 2018 and again in 2019 and finally again in 2021. The main manifestation of her chronic lymphocytic leukemia has been autoimmune hemolytic anemia and she has been treated with steroid therapy for this and has been tapered down to a maintenance 1 mg prednisone dosage at the time of her recent admission. Her prednisone was stopped at the current admission and she was put on dexamethasone. She is currently on IV antibiotics. Outpatient IgG level was measured at 125 and I recommend that she receive IVIG while she is here to boost her immune system as much as possible. For now, she does not need any other interventions. I will check hemolysis studies to see if she has active hemolysis. We will also check iron and B12 levels and follow with you on behalf of [employer]. 3/1/23  -[facility]  She is in good spirits; walking in the room and showering, but still wearing oxygen. Lungs still with extensive changes, possibly residual from COVID. She had a recent cardioversion for afib; she sounds to be in sinus today. She is anticoagulated with Eliquis. In early Feb, her echo showed high LVEF, grade I diastolic dysfunction, markedly dilated LA and high pulmonary arterial pressures. She has mild AI and AS and moderated MR and TR. How much of the lung change is due to CHF and how much from inflammation is unclear. Her anemia is stable. At 8.7. Retics of 3.3% suggest some component of her auto-immune hemolytic anemia, but also inadequate bone marrow response.  Modest elevation of LDH and haptoglobin with NL AST and bilirubin argues against any profound hemolytic process. Low iron and TIBC and elevated CRP suggests ongoing inflammatory process. Will check a ferritin, B12, and direct Jessica    She is getting IV Ig which should help her hemolytic anemia and her immunocompromised state. CLL is stable overall.               Gloria Ramos MD

## 2023-03-01 NOTE — PROGRESS NOTES
JOSE:  1. RUR-18%  2. Return home with family and [address] [place], [place] [address]ARE [facility] services. 3. Hematology/ Pulmonary following. CM following for discharge needs.     [facility]

## 2023-03-01 NOTE — PROGRESS NOTES
Bedside shift change report given to [facility], RN (oncoming nurse) by [facility], RN (offgoing nurse). Report included the following information SBAR, Kardex, ED Summary, OR Summary, Procedure Summary, Intake/Output, MAR, Recent Results, and Med Rec Status.

## 2023-03-02 ENCOUNTER — APPOINTMENT (OUTPATIENT)
Dept: NON INVASIVE DIAGNOSTICS | Age: 88
End: 2023-03-02
Attending: FAMILY MEDICINE
Payer: MEDICARE

## 2023-03-02 LAB
ANTI-COMPLEMENT (C3B,C3D): NORMAL
BNP SERPL-MCNC: 912 PG/ML
CRP SERPL-MCNC: 5.49 MG/DL (ref 0–0.6)
DAT IGG-SP REAG RBC QL: NORMAL
DAT POLY-SP REAG RBC QL: NORMAL
ECHO LV EJECTION FRACTION A2C: 58 %
ECHO LV EJECTION FRACTION A4C: 68 %
ECHO LV FRACTIONAL SHORTENING: 35 % (ref 28–44)
ECHO LV INTERNAL DIMENSION DIASTOLE INDEX: 2.92 CM/M2
ECHO LV INTERNAL DIMENSION DIASTOLIC: 4 CM (ref 3.9–5.3)
ECHO LV INTERNAL DIMENSION SYSTOLIC INDEX: 1.9 CM/M2
ECHO LV INTERNAL DIMENSION SYSTOLIC: 2.6 CM
ERYTHROCYTE [DISTWIDTH] IN BLOOD BY AUTOMATED COUNT: 16.6 % (ref 11.5–14.5)
FERRITIN SERPL-MCNC: 279 NG/ML (ref 26–388)
HCT VFR BLD AUTO: 28.6 % (ref 35–47)
HGB BLD-MCNC: 8.8 G/DL (ref 11.5–16)
MCH RBC QN AUTO: 26.3 PG (ref 26–34)
MCHC RBC AUTO-ENTMCNC: 30.8 G/DL (ref 30–36.5)
MCV RBC AUTO: 85.6 FL (ref 80–99)
NRBC # BLD: 0 K/UL (ref 0–0.01)
NRBC BLD-RTO: 0 PER 100 WBC
PLATELET # BLD AUTO: 360 K/UL (ref 150–400)
PMV BLD AUTO: 8.9 FL (ref 8.9–12.9)
RBC # BLD AUTO: 3.34 M/UL (ref 3.8–5.2)
VIT B12 SERPL-MCNC: 240 PG/ML (ref 193–986)
WBC # BLD AUTO: 15 K/UL (ref 3.6–11)

## 2023-03-02 PROCEDURE — 74011250636 HC RX REV CODE- 250/636: Performed by: PHYSICIAN ASSISTANT

## 2023-03-02 PROCEDURE — 74011000258 HC RX REV CODE- 258: Performed by: INTERNAL MEDICINE

## 2023-03-02 PROCEDURE — 74011250636 HC RX REV CODE- 250/636: Performed by: INTERNAL MEDICINE

## 2023-03-02 PROCEDURE — 83880 ASSAY OF NATRIURETIC PEPTIDE: CPT

## 2023-03-02 PROCEDURE — 74011250637 HC RX REV CODE- 250/637

## 2023-03-02 PROCEDURE — 65270000046 HC RM TELEMETRY

## 2023-03-02 PROCEDURE — 85027 COMPLETE CBC AUTOMATED: CPT

## 2023-03-02 PROCEDURE — 86140 C-REACTIVE PROTEIN: CPT

## 2023-03-02 PROCEDURE — 82607 VITAMIN B-12: CPT

## 2023-03-02 PROCEDURE — 86880 COOMBS TEST DIRECT: CPT

## 2023-03-02 PROCEDURE — 93308 TTE F-UP OR LMTD: CPT

## 2023-03-02 PROCEDURE — 82728 ASSAY OF FERRITIN: CPT

## 2023-03-02 PROCEDURE — 74011250637 HC RX REV CODE- 250/637: Performed by: FAMILY MEDICINE

## 2023-03-02 PROCEDURE — 36415 COLL VENOUS BLD VENIPUNCTURE: CPT

## 2023-03-02 PROCEDURE — 74011000250 HC RX REV CODE- 250: Performed by: INTERNAL MEDICINE

## 2023-03-02 PROCEDURE — 74011250637 HC RX REV CODE- 250/637: Performed by: INTERNAL MEDICINE

## 2023-03-02 RX ADMIN — METOPROLOL TARTRATE 25 MG: 25 TABLET, FILM COATED ORAL at 22:46

## 2023-03-02 RX ADMIN — ACYCLOVIR 400 MG: 200 CAPSULE ORAL at 09:56

## 2023-03-02 RX ADMIN — ASPIRIN 81 MG CHEWABLE TABLET 81 MG: 81 TABLET CHEWABLE at 09:56

## 2023-03-02 RX ADMIN — Medication 10 ML: at 06:40

## 2023-03-02 RX ADMIN — APIXABAN 2.5 MG: 2.5 TABLET, FILM COATED ORAL at 18:00

## 2023-03-02 RX ADMIN — Medication 10 ML: at 22:48

## 2023-03-02 RX ADMIN — FUROSEMIDE 20 MG: 20 TABLET ORAL at 09:57

## 2023-03-02 RX ADMIN — ACETAMINOPHEN 650 MG: 325 TABLET ORAL at 09:57

## 2023-03-02 RX ADMIN — METOPROLOL TARTRATE 25 MG: 25 TABLET, FILM COATED ORAL at 09:57

## 2023-03-02 RX ADMIN — ACETAMINOPHEN 650 MG: 325 TABLET ORAL at 13:08

## 2023-03-02 RX ADMIN — CEFEPIME 2 G: 2 INJECTION, POWDER, FOR SOLUTION INTRAVENOUS at 03:36

## 2023-03-02 RX ADMIN — POLYETHYLENE GLYCOL 3350 17 G: 17 POWDER, FOR SOLUTION ORAL at 22:55

## 2023-03-02 RX ADMIN — DEXAMETHASONE 4 MG: 4 TABLET ORAL at 09:57

## 2023-03-02 RX ADMIN — ACYCLOVIR 400 MG: 200 CAPSULE ORAL at 13:08

## 2023-03-02 RX ADMIN — IRON SUCROSE 200 MG: 20 INJECTION, SOLUTION INTRAVENOUS at 15:44

## 2023-03-02 RX ADMIN — CEFEPIME 2 G: 2 INJECTION, POWDER, FOR SOLUTION INTRAVENOUS at 15:44

## 2023-03-02 RX ADMIN — ACYCLOVIR 400 MG: 200 CAPSULE ORAL at 18:36

## 2023-03-02 RX ADMIN — ACETAMINOPHEN 650 MG: 325 TABLET ORAL at 22:46

## 2023-03-02 RX ADMIN — GUAIFENESIN 600 MG: 600 TABLET, EXTENDED RELEASE ORAL at 09:57

## 2023-03-02 RX ADMIN — GUAIFENESIN 600 MG: 600 TABLET, EXTENDED RELEASE ORAL at 22:47

## 2023-03-02 RX ADMIN — IMMUNE GLOBULIN (HUMAN) 20 G: 10 INJECTION INTRAVENOUS; SUBCUTANEOUS at 11:00

## 2023-03-02 RX ADMIN — ACETAMINOPHEN 650 MG: 325 TABLET ORAL at 18:36

## 2023-03-02 RX ADMIN — AZITHROMYCIN MONOHYDRATE 500 MG: 500 INJECTION, POWDER, LYOPHILIZED, FOR SOLUTION INTRAVENOUS at 18:36

## 2023-03-02 RX ADMIN — APIXABAN 2.5 MG: 2.5 TABLET, FILM COATED ORAL at 09:57

## 2023-03-02 NOTE — PROGRESS NOTES
[facility]  [facility]ist Group                                                                                          Hospitalist Progress Note  Nivia Brody MD  Answering service: [phone] OR [phone] from in house phone        Date of Service:  3/2/2023  NAME:  Clarnce Osler  :  1933  MRN:  [id number]      Admission Summary:   80 y.o. female with history of CLL status post chemo, history of COVID infection January-2023, chronic HFpEF was brought in by EMS with shortness of breath, cough/chest congestion, requiring increasing oxygen support. Patient had COVID infection 2023 treated with Paxlovid. She was admitted in 2023 with COVID and discharged with dexamethasone and supplemental O2 with 2 L nasal cannula. Since discharge she has been increasingly more short of breath requiring increased supplemental oxygen use. She [facility]  and is now using up to 4 L oxygen at home. Patient had a mild fever of 100.2 earlier today but denies any nausea vomiting chills diarrhea constipation dysuria chest pain recent falls injuries syncope abdominal pains. Daughter also reports that patient's feet and legs have been very swollen. Per EMS, patient was saturating 81% on 4 LNC at home which improved to 94% on 6L on arrival.  CXR in the ED showed worsening bilateral infiltrates and bilateral pleural effusions. The ED she received cefepime 2 g IV x1. EDMD wanted to add levofloxacin 750 mg IV x1 but patient's family refused due to patient's history of tendon rupture. Interval history / Subjective:   Feels better today, breathing improved, leg swelling improved, tolerating IVIG. Daughter at bedside. The pt remains on O2.      Assessment & Plan:     pneumonia and suspected worsening pulmonary fibrosis related to COVID  Patient was hospitalized 2023 for COVID 19 pneumonia  Cont IV antibiotics, PO steroids, oxygen at 3L; pulmonary consulted and recs noted  CT chest reviewed with family- IgG started  Monitor CRP daily- trending down    Acute on chronic HFpEF- pulmonary HTN - likely related to worsening lung disease  elevated BNP cont diuresis - swtich from IV BID to daily PO-  TTE pending     H/o CLL- family requesting oncology consult  Patient seen by Dr Betina Landeros- Myrna Salazar noted; IVIG started 3/1     H/o COVID infection Jan-Feb 2023- patient now off precautions     Code status: DNR  Prophylaxis: eliquis  Care Plan discussed with: patient and family RN CM  Anticipated Disposition: home 24-48h     Hospital Problems  Never Reviewed            Codes Class Noted POA    Hospital-acquired pneumonia ICD-10-CM: J18.9, Y95  ICD-9-CM: 565  2/26/2023 Unknown         Review of Systems:   A comprehensive review of systems was negative except for that written in the HPI. Vital Signs:    Last 24hrs VS reviewed since prior progress note.  Most recent are:  Visit Vitals  /70 (BP 1 Location: Right upper arm, BP Patient Position: At rest)   Pulse 78   Temp 98.2 °F (36.8 °C)   Resp 16   Ht 4' 11\" (1.499 m)   Wt 45.7 kg (100 lb 12 oz)   SpO2 (!) 65%   BMI 20.35 kg/m²     Patient Vitals for the past 24 hrs:   Temp Pulse Resp BP SpO2   03/02/23 1301 98.2 °F (36.8 °C) 78 16 121/70 (!) 65 %   03/02/23 1208 -- 77 -- -- --   03/02/23 1132 98.4 °F (36.9 °C) 74 18 (!) 99/52 93 %   03/02/23 1116 98.3 °F (36.8 °C) 70 16 123/60 94 %   03/02/23 1058 98 °F (36.7 °C) 73 18 101/78 96 %   03/02/23 0833 97.4 °F (36.3 °C) 72 18 136/63 94 %   03/02/23 0800 -- -- -- -- 98 %   03/02/23 0600 -- 63 -- -- --   03/02/23 0405 -- 67 -- -- --   03/02/23 0336 97.7 °F (36.5 °C) 69 18 132/66 97 %   03/02/23 0201 -- 65 -- -- --   03/01/23 2208 -- 62 -- -- --   03/01/23 2054 97.5 °F (36.4 °C) 79 18 139/68 95 %   03/01/23 2006 -- 79 -- -- --   03/01/23 1828 -- 80 -- -- --   03/01/23 1442 98.1 °F (36.7 °C) 77 18 114/71 97 %   03/01/23 1407 -- 75 -- -- --        No intake or output data in the 24 hours ending 03/02/23 1327       Physical Examination:     I had a face to face encounter with this patient and independently examined them on 3/2/2023 as outlined below:          Constitutional:  No acute distress, cooperative, pleasant    ENT:  Oral mucosa moist, oropharynx benign, O2 via NC. Resp:  bilateral crackles, decreased breath sound    CV:  Regular rhythm, normal rate, no murmurs, gallops, rubs    GI:  Soft, non distended, non tender. normoactive bowel sounds     Musculoskeletal:  No edema, warm, 2+ pulses throughout    Neurologic:  Moves all extremities. AAOx3, CN II-XII reviewed            Data Review:    Review and/or order of clinical lab test  Review and/or order of tests in the radiology section of CPT  Review and/or order of tests in the medicine section of CPT  CXR Results  (Last 48 hours)      None               Labs:     Recent Labs     03/02/23  0432 03/01/23  0329   WBC 15.0* 16.9*   HGB 8.8* 8.7*   HCT 28.6* 27.8*    337       Recent Labs     02/28/23  0655   *   K 3.8      CO2 30   BUN 14   CREA 0.40*   *   CA 9.0       Recent Labs     02/28/23  0655   ALT 16   AP 68   TBILI 0.7   TP 5.3*   ALB 2.5*   GLOB 2.8       No results for input(s): INR, PTP, APTT, INREXT, INREXT in the last 72 hours. Recent Labs     03/02/23 0432 03/01/23  0329   TIBC  --  247*   PSAT  --  11*   FERR 279  --         No results found for: FOL, RBCF   No results for input(s): PH, PCO2, PO2 in the last 72 hours. No results for input(s): CPK, CKNDX, TROIQ in the last 72 hours.     No lab exists for component: CPKMB  No results found for: CHOL, CHOLX, CHLST, CHOLV, HDL, HDLP, LDL, LDLC, DLDLP, TGLX, TRIGL, TRIGP, CHHD, CHHDX  No results found for: [facility]  Lab Results   Component Value Date/Time    Color YELLOW/STRAW 02/02/2023 07:56 PM    Appearance CLEAR 02/02/2023 07:56 PM    Specific gravity 1.020 02/02/2023 07:56 PM    pH (UA) 5.5 02/02/2023 07:56 PM    Protein Negative 02/02/2023 07:56 PM    Glucose Negative 02/02/2023 07:56 PM    Ketone Negative 02/02/2023 07:56 PM    Bilirubin Negative 02/02/2023 07:56 PM    Urobilinogen 1.0 02/02/2023 07:56 PM    Nitrites Negative 02/02/2023 07:56 PM    Leukocyte Esterase SMALL (A) 02/02/2023 07:56 PM    Epithelial cells MODERATE (A) 02/02/2023 07:56 PM    Bacteria Negative 02/02/2023 07:56 PM    WBC 10-20 02/02/2023 07:56 PM    RBC 0-5 02/02/2023 07:56 PM         Medications Reviewed:     Current Facility-Administered Medications   Medication Dose Route Frequency    acyclovir (ZOVIRAX) capsule 400 mg  400 mg Oral QID    furosemide (LASIX) tablet 20 mg  20 mg Oral DAILY    immune globulin 10% (GAMUNEX-C) infusion 20 g  20 g IntraVENous DAILY    acetaminophen (TYLENOL) tablet 650 mg  650 mg Oral Q4H PRN    dexAMETHasone (DECADRON) tablet 4 mg  4 mg Oral DAILY    acetaminophen (TYLENOL) tablet 650 mg  650 mg Oral QID    sodium chloride (NS) flush 5-40 mL  5-40 mL IntraVENous Q8H    sodium chloride (NS) flush 5-40 mL  5-40 mL IntraVENous PRN    azithromycin (ZITHROMAX) 500 mg in 0.9% sodium chloride 250 mL ([id number])  500 mg IntraVENous Q24H    acetaminophen (TYLENOL) tablet 650 mg  650 mg Oral Q4H PRN    cefepime (MAXIPIME) 2 g in 0.9% sodium chloride (MBP/ADV) 100 mL MBP  2 g IntraVENous Q12H    aspirin chewable tablet 81 mg  81 mg Oral DAILY    guaiFENesin ER (MUCINEX) tablet 600 mg  600 mg Oral Q12H    metoprolol tartrate (LOPRESSOR) tablet 25 mg  25 mg Oral Q12H    polyethylene glycol (MIRALAX) packet 17 g  17 g Oral DAILY PRN    aluminum-magnesium hydroxide (MAALOX) oral suspension 30 mL  30 mL Oral QID PRN    apixaban (ELIQUIS) tablet 2.5 mg  2.5 mg Oral BID     ______________________________________________________________________  EXPECTED LENGTH OF STAY: 5d 4h  ACTUAL LENGTH OF STAY:          4                 Ranjan Gill MD

## 2023-03-02 NOTE — PROGRESS NOTES
[address] Adult  Hospitalist Group                                                                                          Hospitalist Progress Note  Diego Hogan MD  Answering service: [phone] OR [phone] from in house phone        Date of Service:  3/1/2023  NAME:  Alberto Haley  :  1933  MRN:  [id number]      Admission Summary:   80 y.o. female with history of CLL status post chemo, history of COVID infection January-2023, chronic HFpEF was brought in by EMS with shortness of breath, cough/chest congestion, requiring increasing oxygen support. Patient had COVID infection 2023 treated with Paxlovid. She was admitted in 2023 with COVID and discharged with dexamethasone and supplemental O2 with 2 L nasal cannula. Since discharge she has been increasingly more short of breath requiring increased supplemental oxygen use. She had a cardioversion done by her cardiologist at [facility] on  and is now using up to 4 L oxygen at home. Patient had a mild fever of 100.2 earlier today but denies any nausea vomiting chills diarrhea constipation dysuria chest pain recent falls injuries syncope abdominal pains. Daughter also reports that patient's feet and legs have been very swollen. Per EMS, patient was saturating 81% on 4 LNC at home which improved to 94% on 6L on arrival.  CXR in the ED showed worsening bilateral infiltrates and bilateral pleural effusions. The ED she received cefepime 2 g IV x1. EDMD wanted to add levofloxacin 750 mg IV x1 but patient's family refused due to patient's history of tendon rupture.        Interval history / Subjective:   Patient seen and examined- reviewed her vitals ,labs, and careplan  Starting 3 dose IgG infusion today; last dose 3/3/23  BNP finally coming down- family requesting echocardiogram     Assessment & Plan:     pneumonia and suspected worsening pulmonary fibrosis related to COVID  Patient was hospitalized February 2023 for COVID 19 pneumonia  Cont IV antibiotics, PO steroids, oxygen at 3L; pulmonary consulted and recs noted  CT chest reviewed with family- IgG started  Monitor CRP daily- trending down    Acute on chronic HFpEF- pulmonary HTN - likely related to worsening lung disease  elevated BNP cont diuresis - swtich from IV BID to daily PO-  Check TTE     H/o CLL- family requesting oncology consult  Patient seen by Dr Lauryn Ma- Nora Ferreira noted; IgG started     H/o COVID infection Jan-Feb 2023- patient now off precautions     Code status: DNR  Prophylaxis: eliquis  Care Plan discussed with: patient and family  Anticipated Disposition: [address] Problems  Never Reviewed            Codes Class Noted POA    Hospital-acquired pneumonia ICD-10-CM: J18.9, Y95  ICD-9-CM: 801  2/26/2023 Unknown         Review of Systems:   A comprehensive review of systems was negative except for that written in the HPI. Vital Signs:    Last 24hrs VS reviewed since prior progress note.  Most recent are:  Visit Vitals  /71 (BP 1 Location: Right upper arm, BP Patient Position: At rest)   Pulse 80   Temp 98.1 °F (36.7 °C)   Resp 18   Ht 4' 11\" (1.499 m)   Wt 45.7 kg (100 lb 12 oz)   SpO2 97%   BMI 20.35 kg/m²     Patient Vitals for the past 24 hrs:   Temp Pulse Resp BP SpO2   03/01/23 1828 -- 80 -- -- --   03/01/23 1442 98.1 °F (36.7 °C) 77 18 114/71 97 %   03/01/23 1407 -- 75 -- -- --   03/01/23 1323 98 °F (36.7 °C) 71 18 (!) 123/54 95 %   03/01/23 1248 98.7 °F (37.1 °C) 69 24 (!) 122/47 96 %   03/01/23 1208 98.4 °F (36.9 °C) 69 (!) 32 136/66 97 %   03/01/23 1002 -- 99 -- -- --   03/01/23 0826 97.3 °F (36.3 °C) 74 18 128/60 95 %   03/01/23 0600 -- 65 -- -- --   03/01/23 0400 -- 61 -- -- --   03/01/23 0339 97.8 °F (36.6 °C) 68 18 132/68 96 %   03/01/23 0206 -- 64 -- -- --   02/28/23 2203 -- 61 -- -- --   02/28/23 2113 97.6 °F (36.4 °C) 73 16 137/63 95 %   02/28/23 2000 -- 72 -- -- --          Intake/Output Summary (Last 24 hours) at 3/1/2023 1946  Last data filed at 3/1/2023 0321  Gross per 24 hour   Intake --   Output 1600 ml   Net -1600 ml          Physical Examination:     I had a face to face encounter with this patient and independently examined them on 3/1/2023 as outlined below:          Constitutional:  No acute distress, cooperative, pleasant    ENT:  Oral mucosa moist, oropharynx benign, O2 via NC. Resp:  bilateral crackles, decreased breath sound    CV:  Regular rhythm, normal rate, no murmurs, gallops, rubs    GI:  Soft, non distended, non tender. normoactive bowel sounds     Musculoskeletal:  No edema, warm, 2+ pulses throughout    Neurologic:  Moves all extremities. AAOx3, CN II-XII reviewed            Data Review:    Review and/or order of clinical lab test  Review and/or order of tests in the radiology section of CPT  Review and/or order of tests in the medicine section of CPT  CXR Results  (Last 48 hours)      None               Labs:     Recent Labs     03/01/23 0329 02/28/23  0655   WBC 16.9* 12.3*   HGB 8.7* 8.0*   HCT 27.8* 24.6*    271       Recent Labs     02/28/23  0655 02/27/23  0329   * 134*   K 3.8 3.3*    98   CO2 30 31   BUN 14 14   CREA 0.40* 0.42*   * 92   CA 9.0 9.4   MG  --  2.2       Recent Labs     02/28/23  0655   ALT 16   AP 68   TBILI 0.7   TP 5.3*   ALB 2.5*   GLOB 2.8       No results for input(s): INR, PTP, APTT, INREXT, INREXT in the last 72 hours. Recent Labs     03/01/23 0329   TIBC 247*   PSAT 11*        No results found for: FOL, RBCF   No results for input(s): PH, PCO2, PO2 in the last 72 hours. No results for input(s): CPK, CKNDX, TROIQ in the last 72 hours.     No lab exists for component: CPKMB  No results found for: CHOL, CHOLX, CHLST, CHOLV, HDL, HDLP, LDL, LDLC, DLDLP, TGLX, TRIGL, TRIGP, CHHD, CHHDX  No results found for: [facility]  Lab Results   Component Value Date/Time    Color YELLOW/STRAW 02/02/2023 07:56 PM    Appearance CLEAR 02/02/2023 07:56 PM    Specific gravity 1.020 02/02/2023 07:56 PM    pH (UA) 5.5 02/02/2023 07:56 PM    Protein Negative 02/02/2023 07:56 PM    Glucose Negative 02/02/2023 07:56 PM    Ketone Negative 02/02/2023 07:56 PM    Bilirubin Negative 02/02/2023 07:56 PM    Urobilinogen 1.0 02/02/2023 07:56 PM    Nitrites Negative 02/02/2023 07:56 PM    Leukocyte Esterase SMALL (A) 02/02/2023 07:56 PM    Epithelial cells MODERATE (A) 02/02/2023 07:56 PM    Bacteria Negative 02/02/2023 07:56 PM    WBC 10-20 02/02/2023 07:56 PM    RBC 0-5 02/02/2023 07:56 PM         Medications Reviewed:     Current Facility-Administered Medications   Medication Dose Route Frequency    acyclovir (ZOVIRAX) capsule 400 mg  400 mg Oral QID    [START ON 3/2/2023] furosemide (LASIX) tablet 20 mg  20 mg Oral DAILY    immune globulin 10% (GAMUNEX-C) infusion 20 g  20 g IntraVENous DAILY    acetaminophen (TYLENOL) tablet 650 mg  650 mg Oral Q4H PRN    dexAMETHasone (DECADRON) tablet 4 mg  4 mg Oral DAILY    acetaminophen (TYLENOL) tablet 650 mg  650 mg Oral QID    sodium chloride (NS) flush 5-40 mL  5-40 mL IntraVENous Q8H    sodium chloride (NS) flush 5-40 mL  5-40 mL IntraVENous PRN    azithromycin (ZITHROMAX) 500 mg in 0.9% sodium chloride 250 mL ([id number])  500 mg IntraVENous Q24H    acetaminophen (TYLENOL) tablet 650 mg  650 mg Oral Q4H PRN    cefepime (MAXIPIME) 2 g in 0.9% sodium chloride (MBP/ADV) 100 mL MBP  2 g IntraVENous Q12H    aspirin chewable tablet 81 mg  81 mg Oral DAILY    guaiFENesin ER (MUCINEX) tablet 600 mg  600 mg Oral Q12H    metoprolol tartrate (LOPRESSOR) tablet 25 mg  25 mg Oral Q12H    polyethylene glycol (MIRALAX) packet 17 g  17 g Oral DAILY PRN    aluminum-magnesium hydroxide (MAALOX) oral suspension 30 mL  30 mL Oral QID PRN    apixaban (ELIQUIS) tablet 2.5 mg  2.5 mg Oral BID     ______________________________________________________________________  EXPECTED LENGTH OF STAY: 5d 4h  ACTUAL LENGTH OF STAY: [address] Bhupendra Naidu MD

## 2023-03-02 NOTE — PROGRESS NOTES
Bedside shift change report given to [address] (oncoming nurse) by [facility], RN (offgoing nurse). Report included the following information SBAR, Kardex, ED Summary, OR Summary, Procedure Summary, Intake/Output, MAR, Recent Results, and Med Rec Status.

## 2023-03-02 NOTE — PROGRESS NOTES
Problem: Risk for Spread of Infection  Goal: Prevent transmission of infectious organism to others  Description: Prevent the transmission of infectious organisms to other patients, staff members, and visitors. Outcome: Progressing Towards Goal     Problem: Patient Education:  Go to Education Activity  Goal: Patient/Family Education  Outcome: Progressing Towards Goal     Problem: Falls - Risk of  Goal: *Absence of Falls  Description: Document Bertrum Mast Fall Risk and appropriate interventions in the flowsheet.   Outcome: Progressing Towards Goal  Note: Fall Risk Interventions:  Mobility Interventions: Bed/chair exit alarm, PT Consult for mobility concerns, Patient to call before getting OOB, Utilize walker, cane, or other assistive device, Utilize gait belt for transfers/ambulation         Medication Interventions: Bed/chair exit alarm, Evaluate medications/consider consulting pharmacy, Patient to call before getting OOB, Teach patient to arise slowly, Utilize gait belt for transfers/ambulation    Elimination Interventions: Call light in reach, Elevated toilet seat, Patient to call for help with toileting needs, Stay With Me (per policy), Toilet paper/wipes in reach, Toileting schedule/hourly rounds              Problem: Patient Education: Go to Patient Education Activity  Goal: Patient/Family Education  Outcome: Progressing Towards Goal     Problem: Patient Education: Go to Patient Education Activity  Goal: Patient/Family Education  Outcome: Progressing Towards Goal     Problem: Patient Education: Go to Patient Education Activity  Goal: Patient/Family Education  Outcome: Progressing Towards Goal     Problem: Patient Education: Go to Patient Education Activity  Goal: Patient/Family Education  Outcome: Progressing Towards Goal     Problem: Heart Failure: Day 2  Goal: Activity/Safety  Outcome: Progressing Towards Goal     Problem: Patient Education: Go to Patient Education Activity  Goal: Patient/Family Education  Outcome: Progressing Towards Goal     Problem: Pneumonia: Day 1  Goal: Off Pathway (Use only if patient is Off Pathway)  Outcome: Progressing Towards Goal     Problem: Heart Failure: Day 3  Goal: Off Pathway (Use only if patient is Off Pathway)  Outcome: Progressing Towards Goal  Goal: Activity/Safety  Outcome: Progressing Towards Goal  Goal: Diagnostic Test/Procedures  Outcome: Progressing Towards Goal  Goal: Nutrition/Diet  Outcome: Progressing Towards Goal  Goal: Discharge Planning  Outcome: Progressing Towards Goal  Goal: Medications  Outcome: Progressing Towards Goal  Goal: Respiratory  Outcome: Progressing Towards Goal  Goal: Treatments/Interventions/Procedures  Outcome: Progressing Towards Goal  Goal: Psychosocial  Outcome: Progressing Towards Goal  Goal: *Oxygen saturation within defined limits  Outcome: Progressing Towards Goal  Goal: *Hemodynamically stable  Outcome: Progressing Towards Goal  Goal: *Optimal pain control at patient's stated goal  Outcome: Progressing Towards Goal  Goal: *Anxiety reduced or absent  Outcome: Progressing Towards Goal  Goal: *Demonstrates progressive activity  Outcome: Progressing Towards Goal     Problem: Heart Failure: Day 4  Goal: Off Pathway (Use only if patient is Off Pathway)  Outcome: Progressing Towards Goal  Goal: Activity/Safety  Outcome: Progressing Towards Goal  Goal: Diagnostic Test/Procedures  Outcome: Progressing Towards Goal  Goal: Nutrition/Diet  Outcome: Progressing Towards Goal  Goal: Discharge Planning  Outcome: Progressing Towards Goal  Goal: Medications  Outcome: Progressing Towards Goal  Goal: Respiratory  Outcome: Progressing Towards Goal  Goal: Treatments/Interventions/Procedures  Outcome: Progressing Towards Goal  Goal: Psychosocial  Outcome: Progressing Towards Goal  Goal: *Oxygen saturation within defined limits  Outcome: Progressing Towards Goal  Goal: *Hemodynamically stable  Outcome: Progressing Towards Goal  Goal: *Optimal pain control at patient's stated goal  Outcome: Progressing Towards Goal  Goal: *Anxiety reduced or absent  Outcome: Progressing Towards Goal  Goal: *Demonstrates progressive activity  Outcome: Progressing Towards Goal     Problem: Heart Failure: Day 5  Goal: Off Pathway (Use only if patient is Off Pathway)  Outcome: Progressing Towards Goal  Goal: Activity/Safety  Outcome: Progressing Towards Goal  Goal: Diagnostic Test/Procedures  Outcome: Progressing Towards Goal  Goal: Nutrition/Diet  Outcome: Progressing Towards Goal  Goal: Discharge Planning  Outcome: Progressing Towards Goal  Goal: Medications  Outcome: Progressing Towards Goal  Goal: Respiratory  Outcome: Progressing Towards Goal  Goal: Treatments/Interventions/Procedures  Outcome: Progressing Towards Goal  Goal: Psychosocial  Outcome: Progressing Towards Goal     Problem: Heart Failure: Discharge Outcomes  Goal: *Demonstrates ability to perform prescribed activity without shortness of breath or discomfort  Outcome: Progressing Towards Goal  Goal: *Left ventricular function assessment completed prior to or during stay, or planned for post-discharge  Outcome: Progressing Towards Goal  Goal: *ACEI prescribed if LVEF less than 40% and no contraindications or ARB prescribed  Outcome: Progressing Towards Goal  Goal: *Verbalizes understanding and describes prescribed diet  Outcome: Progressing Towards Goal  Goal: *Verbalizes understanding/describes prescribed medications  Outcome: Progressing Towards Goal  Goal: *Describes available resources and support systems  Description: (eg: Home Health, Palliative Care, Advanced Medical Directive)  Outcome: Progressing Towards Goal  Goal: *Describes smoking cessation resources  Outcome: Progressing Towards Goal  Goal: *Understands and describes signs and symptoms to report to providers(Stroke Metric)  Outcome: Progressing Towards Goal  Goal: *Describes/verbalizes understanding of follow-up/return appt  Description: (eg: to physicians, diabetes treatment coordinator, and other resources  Outcome: Progressing Towards Goal  Goal: *Describes importance of continuing daily weights and changes to report to physician  Outcome: Progressing Towards Goal

## 2023-03-02 NOTE — PROGRESS NOTES
Rounded on [facility] patients and provided Anointing of the Sick  and Communion at request of patient.     Regan Coleman

## 2023-03-02 NOTE — PROGRESS NOTES
Hematology-Oncology Progress Note    Wilfrido Whitehead  1/8/1933  [id number]  3/2/2023    Follow-up for: cll/anemia     [x]        Chart notes since last visit reviewed   [x]        Medications reviewed for allergies and interactions       Case discussed with the following:         []                            []        Nursing Staff                                                                         []        Pathologist                                                                        [x]        FAMILY      Subjective:     Spoke with patient who complains of: breathing easier, did well with ivig    Objective:   Patient Vitals for the past 24 hrs:   BP Temp Pulse Resp SpO2 Height Weight   03/02/23 1434 (!) 108/57 -- -- -- -- 4' 11\" (1.499 m) 45.4 kg (100 lb)   03/02/23 1423 -- -- 74 -- -- -- --   03/02/23 1409 (!) 108/57 97.9 °F (36.6 °C) 77 18 96 % -- --   03/02/23 1301 121/70 98.2 °F (36.8 °C) 78 16 (!) 65 % -- --   03/02/23 1208 -- -- 77 -- -- -- --   03/02/23 1132 (!) 99/52 98.4 °F (36.9 °C) 74 18 93 % -- --   03/02/23 1116 123/60 98.3 °F (36.8 °C) 70 16 94 % -- --   03/02/23 1058 101/78 98 °F (36.7 °C) 73 18 96 % -- --   03/02/23 0833 136/63 97.4 °F (36.3 °C) 72 18 94 % -- --   03/02/23 0800 -- -- -- -- 98 % -- --   03/02/23 0600 -- -- 63 -- -- -- --   03/02/23 0405 -- -- 67 -- -- -- --   03/02/23 0336 132/66 97.7 °F (36.5 °C) 69 18 97 % -- --   03/02/23 0201 -- -- 65 -- -- -- --   03/01/23 2208 -- -- 62 -- -- -- --   03/01/23 2054 139/68 97.5 °F (36.4 °C) 79 18 95 % -- --   03/01/23 2006 -- -- 79 -- -- -- --   03/01/23 1828 -- -- 80 -- -- -- --       REVIEW OF SYSTEMS:    Constitutional: negative fever, negative chills, negative weight loss  Eyes:   negative visual changes  ENT:   negative sore throat, tongue or lip swelling  Respiratory:  negative cough, negative dyspnea  Cards:  negative for chest pain, palpitations, lower extremity edema  GI:   negative for nausea, vomiting, diarrhea, and abdominal pain  Neuro:  negative for headaches, dizziness, vertigo  []                        Full ROS o/w normal/non contributor    Constitutional:  Patient looks  []        Sick  []        Frail  [x]        Better                                                 []        Depressed    HEENT:  [x]   NC                         []   AT               []    ALOPECIA           Eyes: [x]   Normal               []    Icteric  Oropharynx: []    Normal                  []  Thrush               []   Dry  Mucositis: []    None                 Grade: []        I  []        II  []        III  []        IV  Neck:   []   Supple                  []  Rigid               JVD:    []   ABSENT       []   PRESENT  Lymphadenopathy:   []   None Noted            []   PRESENT    Chest:  Aerating we;;    CV:             []   Regular              []  Irregular               []   Tachy                []   Murmur  Abdominal:   []    Soft              []   NON-tender               []   Tender      BS:    []   ABSENT                   []   PRESENT  Liver:     []  NON-palp                  []   EDGE- palp  Spleen: []   NON-palp                   []  EDGE - palp  Mass:   []   ABSENT                          []  PRESENT  Extr:    []  Lymphedema             []   Cyanosis      []  Clubbing  Edema:     []   NONE       []   PRESENT  Skin:  Intact []           Purpura []        Rash: []   ABSENT       []  PRESENT  Neuro:  [x]        Normal  []        Confused      Available labs reviewed:  Labs:    Recent Results (from the past 24 hour(s))   C REACTIVE PROTEIN, QT    Collection Time: 03/02/23  4:32 AM   Result Value Ref Range    C-Reactive protein 5.49 (H) 0.00 - 0.60 mg/dL   CBC W/O DIFF    Collection Time: 03/02/23  4:32 AM   Result Value Ref Range    WBC 15.0 (H) 3.6 - 11.0 K/uL    RBC 3.34 (L) 3.80 - 5.20 M/uL    HGB 8.8 (L) 11.5 - 16.0 g/dL    HCT 28.6 (L) 35.0 - 47.0 %    MCV 85.6 80.0 - 99.0 FL    MCH 26.3 26.0 - 34.0 PG    MCHC 30.8 30.0 - 36.5 g/dL    RDW 16.6 (H) 11.5 - 14.5 %    PLATELET 704 979 - 942 K/uL    MPV 8.9 8.9 - 12.9 FL    NRBC 0.0 0  WBC    ABSOLUTE NRBC 0.00 0.00 - 0.01 K/uL   NT-PRO BNP    Collection Time: 03/02/23  4:32 AM   Result Value Ref Range    NT pro- (H) <450 PG/ML   FERRITIN    Collection Time: 03/02/23  4:32 AM   Result Value Ref Range    Ferritin 279 26 - 388 NG/ML   VITAMIN B12    Collection Time: 03/02/23  4:32 AM   Result Value Ref Range    Vitamin B12 240 193 - 986 pg/mL   DIRECT BRIDGET    Collection Time: 03/02/23  4:32 AM   Result Value Ref Range    LAURA Poly POS     LAURA IgG POS     LAURA C3b/C3d POS        Available Xrays reviewed:    Chemotherapy monitored and toxicities assessed:    Assessment and Plan   CLL. .. off tx currently,  previously treated with rituxan, wbc 15k today    Anemia. Flor Laws she has hx of AIHA treated with steroids/rituxan and steroid taper,  her labs this admit do not support active hemolysis. She does have iron def. I will give iv iron    Hypogammaglobulinemia. Flor Laws given ivig without difficulty    Respiratory infection. . recent COVID  PNA now dealing with sequelae. . defer to primary team.  Too Ching MD

## 2023-03-03 LAB
ANION GAP SERPL CALC-SCNC: 6 MMOL/L (ref 5–15)
BACTERIA SPEC CULT: NORMAL
BUN SERPL-MCNC: 21 MG/DL (ref 6–20)
BUN/CREAT SERPL: 51 (ref 12–20)
CALCIUM SERPL-MCNC: 9.8 MG/DL (ref 8.5–10.1)
CHLORIDE SERPL-SCNC: 102 MMOL/L (ref 97–108)
CO2 SERPL-SCNC: 30 MMOL/L (ref 21–32)
CREAT SERPL-MCNC: 0.41 MG/DL (ref 0.55–1.02)
ERYTHROCYTE [DISTWIDTH] IN BLOOD BY AUTOMATED COUNT: 16.5 % (ref 11.5–14.5)
GLUCOSE SERPL-MCNC: 104 MG/DL (ref 65–100)
HCT VFR BLD AUTO: 25.8 % (ref 35–47)
HGB BLD-MCNC: 8.1 G/DL (ref 11.5–16)
MCH RBC QN AUTO: 26.7 PG (ref 26–34)
MCHC RBC AUTO-ENTMCNC: 31.4 G/DL (ref 30–36.5)
MCV RBC AUTO: 85.1 FL (ref 80–99)
NRBC # BLD: 0 K/UL (ref 0–0.01)
NRBC BLD-RTO: 0 PER 100 WBC
PLATELET # BLD AUTO: 298 K/UL (ref 150–400)
PMV BLD AUTO: 8.8 FL (ref 8.9–12.9)
POTASSIUM SERPL-SCNC: 4 MMOL/L (ref 3.5–5.1)
RBC # BLD AUTO: 3.03 M/UL (ref 3.8–5.2)
SERVICE CMNT-IMP: NORMAL
SODIUM SERPL-SCNC: 138 MMOL/L (ref 136–145)
WBC # BLD AUTO: 15.1 K/UL (ref 3.6–11)

## 2023-03-03 PROCEDURE — 74011250636 HC RX REV CODE- 250/636: Performed by: INTERNAL MEDICINE

## 2023-03-03 PROCEDURE — 97116 GAIT TRAINING THERAPY: CPT

## 2023-03-03 PROCEDURE — 80048 BASIC METABOLIC PNL TOTAL CA: CPT

## 2023-03-03 PROCEDURE — 65270000046 HC RM TELEMETRY

## 2023-03-03 PROCEDURE — 74011250637 HC RX REV CODE- 250/637

## 2023-03-03 PROCEDURE — 74011250637 HC RX REV CODE- 250/637: Performed by: FAMILY MEDICINE

## 2023-03-03 PROCEDURE — 74011250637 HC RX REV CODE- 250/637: Performed by: INTERNAL MEDICINE

## 2023-03-03 PROCEDURE — 74011250636 HC RX REV CODE- 250/636: Performed by: PHYSICIAN ASSISTANT

## 2023-03-03 PROCEDURE — 74011000258 HC RX REV CODE- 258: Performed by: INTERNAL MEDICINE

## 2023-03-03 PROCEDURE — 97535 SELF CARE MNGMENT TRAINING: CPT

## 2023-03-03 PROCEDURE — 36415 COLL VENOUS BLD VENIPUNCTURE: CPT

## 2023-03-03 PROCEDURE — 74011000250 HC RX REV CODE- 250: Performed by: INTERNAL MEDICINE

## 2023-03-03 PROCEDURE — 97110 THERAPEUTIC EXERCISES: CPT

## 2023-03-03 PROCEDURE — 85027 COMPLETE CBC AUTOMATED: CPT

## 2023-03-03 RX ADMIN — POLYETHYLENE GLYCOL 3350 17 G: 17 POWDER, FOR SOLUTION ORAL at 22:39

## 2023-03-03 RX ADMIN — ACYCLOVIR 400 MG: 200 CAPSULE ORAL at 15:38

## 2023-03-03 RX ADMIN — METOPROLOL TARTRATE 25 MG: 25 TABLET, FILM COATED ORAL at 22:24

## 2023-03-03 RX ADMIN — ASPIRIN 81 MG CHEWABLE TABLET 81 MG: 81 TABLET CHEWABLE at 10:57

## 2023-03-03 RX ADMIN — FUROSEMIDE 20 MG: 20 TABLET ORAL at 10:57

## 2023-03-03 RX ADMIN — SODIUM CHLORIDE, PRESERVATIVE FREE 10 ML: 5 INJECTION INTRAVENOUS at 22:23

## 2023-03-03 RX ADMIN — ACYCLOVIR 400 MG: 200 CAPSULE ORAL at 10:58

## 2023-03-03 RX ADMIN — APIXABAN 2.5 MG: 2.5 TABLET, FILM COATED ORAL at 18:13

## 2023-03-03 RX ADMIN — Medication 10 ML: at 14:00

## 2023-03-03 RX ADMIN — ACETAMINOPHEN 650 MG: 325 TABLET ORAL at 10:55

## 2023-03-03 RX ADMIN — ACETAMINOPHEN 650 MG: 325 TABLET ORAL at 15:38

## 2023-03-03 RX ADMIN — IRON SUCROSE 200 MG: 20 INJECTION, SOLUTION INTRAVENOUS at 11:23

## 2023-03-03 RX ADMIN — DEXAMETHASONE 4 MG: 4 TABLET ORAL at 10:56

## 2023-03-03 RX ADMIN — GUAIFENESIN 600 MG: 600 TABLET, EXTENDED RELEASE ORAL at 22:23

## 2023-03-03 RX ADMIN — IMMUNE GLOBULIN (HUMAN) 20 G: 10 INJECTION INTRAVENOUS; SUBCUTANEOUS at 11:20

## 2023-03-03 RX ADMIN — METOPROLOL TARTRATE 25 MG: 25 TABLET, FILM COATED ORAL at 10:57

## 2023-03-03 RX ADMIN — CEFEPIME 2 G: 2 INJECTION, POWDER, FOR SOLUTION INTRAVENOUS at 16:48

## 2023-03-03 RX ADMIN — ACYCLOVIR 400 MG: 200 CAPSULE ORAL at 22:24

## 2023-03-03 RX ADMIN — CEFEPIME 2 G: 2 INJECTION, POWDER, FOR SOLUTION INTRAVENOUS at 03:46

## 2023-03-03 RX ADMIN — ACETAMINOPHEN 650 MG: 325 TABLET ORAL at 22:23

## 2023-03-03 RX ADMIN — Medication 10 ML: at 22:35

## 2023-03-03 RX ADMIN — GUAIFENESIN 600 MG: 600 TABLET, EXTENDED RELEASE ORAL at 10:58

## 2023-03-03 RX ADMIN — APIXABAN 2.5 MG: 2.5 TABLET, FILM COATED ORAL at 10:57

## 2023-03-03 NOTE — PROGRESS NOTES
PCCM    VSS.  On room air    WBC 15    Plan:  CLL    Hypogammaglobinemia    Completing 3 days of IVIG   O2 titration above 90%   Follow up with Dr Irlanda Garcia in 1-2 wks following discharge    Markel Sandoval PA-C

## 2023-03-03 NOTE — PROGRESS NOTES
JOSE:  1. RUR-17%  2. Return home with family and Akilah Laundry. 3. Patient has home oxygen from previous admission supplied through [address]. CM met with patient at bedside and provided her IMM letter. Medicare pt has received, reviewed, and signed 2nd IM letter informing them of their right to appeal the discharge. Signed copy has been placed on pt bedside chart.       Roseline Anguiano [facility]

## 2023-03-03 NOTE — PROGRESS NOTES
Problem: Mobility Impaired (Adult and Pediatric)  Goal: *Acute Goals and Plan of Care (Insert Text)  Description: FUNCTIONAL STATUS PRIOR TO ADMISSION: Pt reports using a rolling walker PTA. HOME SUPPORT PRIOR TO ADMISSION: The patient lives with her children and has required increased assistance since hospital admission earlier this month (pt admitted with [facility] in Jan and Feb 2023, required return home with 2L NC at that time; underwent cardioversion 2/24 and now requiring 4L NC). Physical Therapy Goals  Initiated 2/27/2023  1. Patient will move from supine to sit and sit to supine  in bed with modified independence within 7 day(s). 2.  Patient will transfer from bed to chair and chair to bed with supervision/set-up using the least restrictive device within 7 day(s). 3.  Patient will perform sit to stand with supervision/set-up within 7 day(s). 4.  Patient will ambulate with supervision/set-up for 100 feet with the least restrictive device within 7 day(s). 5.  Patient will ascend/descend 3 stairs with bilateral handrail(s) with minimal assistance/contact guard assist within 7 day(s). Outcome: Progressing Towards Goal   PHYSICAL THERAPY TREATMENT  Patient: Zain Rebolledo (29 y.o. female)  Date: 3/3/2023  Diagnosis: Hospital-acquired pneumonia [J18.9, Y95] <principal problem not specified>      Precautions: Fall (Droplet+)  Chart, physical therapy assessment, plan of care and goals were reviewed. ASSESSMENT  Patient continues with skilled PT services and is progressing towards goals. Patient received up in chair and most agreeable to therapy. Family present and assisting appropriately. Able to ambulate in dunaway today , starting with 1.5 liters but saturation dropped to 87% therefore up to 2 liters and maintained well between 88-95%. Gait is slow and steady but patient is holding to a hand. States has a cane at home and feel this will be sufficient at discharge.   Returned to use toilet and then back to chair with all needs met. .     Current Level of Function Impacting Discharge (mobility/balance): contact guard/supervision for upright mobility    Other factors to consider for discharge:          PLAN :  Patient continues to benefit from skilled intervention to address the above impairments. Continue treatment per established plan of care. to address goals. Recommendation for discharge: (in order for the patient to meet his/her long term goals)  Physical therapy at least 2 days/week in the home AND ensure assist and/or supervision for safety with mobility    This discharge recommendation:  Has been made in collaboration with the attending provider and/or case management    IF patient discharges home will need the following DME: patient owns DME required for discharge       SUBJECTIVE:   Patient stated Oh good.  upon arrival for therapy    OBJECTIVE DATA SUMMARY:   Critical Behavior:  Neurologic State: Alert  Orientation Level: Oriented X4  Cognition: Follows commands  Safety/Judgement: Awareness of environment  Functional Mobility Training:  Bed Mobility:                    Transfers:  Sit to Stand: Supervision  Stand to Sit: Supervision                             Balance:  Sitting: Intact  Standing: With support  Standing - Static: Good  Standing - Dynamic : Fair  Ambulation/Gait Training:  Distance (ft): 100 Feet (ft)  Assistive Device: Gait belt  Ambulation - Level of Assistance: Contact guard assistance;Minimal assistance                 Base of Support: Center of gravity altered     Speed/Callie: Slow  Step Length: Right shortened;Left shortened      Hand hold assist      Activity Tolerance:   Fair and desaturates with exertion and requires oxygen    After treatment patient left in no apparent distress:   Sitting in chair, Call bell within reach, and Caregiver / family present    COMMUNICATION/COLLABORATION:   The patients plan of care was discussed with: Registered nurse.      Amelia Wong Jose Cordon, PT   Time Calculation: 21 mins

## 2023-03-03 NOTE — PROGRESS NOTES
Hematology-Oncology Progress Note    Cherelle Carr  1/8/1933  [id number]  3/3/2023    Follow-up for: cll/anemia     [x]        Chart notes since last visit reviewed   [x]        Medications reviewed for allergies and interactions       Case discussed with the following:         []                            []        Nursing Staff                                                                         []        Pathologist                                                                        [x]        FAMILY      Subjective:     Spoke with patient who complains of: breathing easier, did well with ivig, and iron yesterday    Objective:   Patient Vitals for the past 24 hrs:   BP Temp Pulse Resp SpO2 Height Weight   03/03/23 0822 130/78 97.7 °F (36.5 °C) 69 18 98 % -- --   03/03/23 0558 -- -- 69 -- -- -- --   03/03/23 0400 -- -- 61 -- -- -- --   03/03/23 0253 (!) 116/59 98.2 °F (36.8 °C) 72 18 95 % -- --   03/03/23 0200 -- -- 69 -- -- -- --   03/03/23 0020 128/69 98.4 °F (36.9 °C) 72 18 97 % -- --   03/02/23 2246 138/68 -- -- -- -- -- --   03/02/23 2200 -- -- 69 -- -- -- --   03/02/23 2030 -- -- -- -- 96 % -- --   03/02/23 2000 -- -- 77 -- -- -- --   03/02/23 1927 124/66 97.7 °F (36.5 °C) 71 18 96 % -- --   03/02/23 1811 -- -- 75 -- -- -- --   03/02/23 1434 (!) 108/57 -- -- -- -- 4' 11\" (1.499 m) 45.4 kg (100 lb)   03/02/23 1423 -- -- 74 -- -- -- --   03/02/23 1409 (!) 108/57 97.9 °F (36.6 °C) 77 18 96 % -- --   03/02/23 1301 121/70 98.2 °F (36.8 °C) 78 16 (!) 65 % -- --   03/02/23 1208 -- -- 77 -- -- -- --   03/02/23 1132 (!) 99/52 98.4 °F (36.9 °C) 74 18 93 % -- --   03/02/23 1116 123/60 98.3 °F (36.8 °C) 70 16 94 % -- --   03/02/23 1058 101/78 98 °F (36.7 °C) 73 18 96 % -- --         REVIEW OF SYSTEMS:    Constitutional: negative fever, negative chills, negative weight loss  Eyes:   negative visual changes  ENT:   negative sore throat, tongue or lip swelling  Respiratory:  negative cough, negative dyspnea  Cards:  negative for chest pain, palpitations, lower extremity edema  GI:   negative for nausea, vomiting, diarrhea, and abdominal pain  Neuro:  negative for headaches, dizziness, vertigo  []                        Full ROS o/w normal/non contributor    Constitutional:  Patient looks  []        Sick  []        Frail  [x]        Better                                                 []        Depressed    HEENT:  [x]   NC                         []   AT               []    ALOPECIA           Eyes: [x]   Normal               []    Icteric  Oropharynx: []    Normal                  []  Thrush               []   Dry  Mucositis: []    None                 Grade: []        I  []        II  []        III  []        IV  Neck:   []   Supple                  []  Rigid               JVD:    []   ABSENT       []   PRESENT  Lymphadenopathy:   []   None Noted            []   PRESENT    Chest:  Aerating we;;    CV:             []   Regular              []  Irregular               []   Tachy                []   Murmur  Abdominal:   []    Soft              []   NON-tender               []   Tender      BS:    []   ABSENT                   []   PRESENT  Liver:     []  NON-palp                  []   EDGE- palp  Spleen: []   NON-palp                   []  EDGE - palp  Mass:   []   ABSENT                          []  PRESENT  Extr:    []  Lymphedema             []   Cyanosis      []  Clubbing  Edema:     []   NONE       []   PRESENT  Skin:  Intact []           Purpura []        Rash: []   ABSENT       []  PRESENT  Neuro:  [x]        Normal  []        Confused      Available labs reviewed:  Labs:    Recent Results (from the past 24 hour(s))   ECHO ADULT FOLLOW-UP OR LIMITED    Collection Time: 03/02/23  2:41 PM   Result Value Ref Range    Fractional Shortening 2D 35 28 - 44 %    LVIDd Index 2.92 cm/m2    LVIDs Index 1.90 cm/m2    LVIDd 4.0 3.9 - 5.3 cm    LVIDs 2.6 cm    LV Ejection Fraction A2C 58 %    LV Ejection Fraction A4C 68 %   CBC W/O DIFF    Collection Time: 03/03/23  5:19 AM   Result Value Ref Range    WBC 15.1 (H) 3.6 - 11.0 K/uL    RBC 3.03 (L) 3.80 - 5.20 M/uL    HGB 8.1 (L) 11.5 - 16.0 g/dL    HCT 25.8 (L) 35.0 - 47.0 %    MCV 85.1 80.0 - 99.0 FL    MCH 26.7 26.0 - 34.0 PG    MCHC 31.4 30.0 - 36.5 g/dL    RDW 16.5 (H) 11.5 - 14.5 %    PLATELET 429 528 - 709 K/uL    MPV 8.8 (L) 8.9 - 12.9 FL    NRBC 0.0 0  WBC    ABSOLUTE NRBC 0.00 0.00 - 7.49 K/uL   METABOLIC PANEL, BASIC    Collection Time: 03/03/23  5:19 AM   Result Value Ref Range    Sodium 138 136 - 145 mmol/L    Potassium 4.0 3.5 - 5.1 mmol/L    Chloride 102 97 - 108 mmol/L    CO2 30 21 - 32 mmol/L    Anion gap 6 5 - 15 mmol/L    Glucose 104 (H) 65 - 100 mg/dL    BUN 21 (H) 6 - 20 MG/DL    Creatinine 0.41 (L) 0.55 - 1.02 MG/DL    BUN/Creatinine ratio 51 (H) 12 - 20      eGFR >60 >60 ml/min/1.73m2    Calcium 9.8 8.5 - 10.1 MG/DL       Available Xrays reviewed:    Chemotherapy monitored and toxicities assessed:    Assessment and Plan   CLL. .. off tx currently,  previously treated with rituxan, wbc stable at 15k today    Anemia. Orlando Nunez she has hx of AIHA treated with steroids/rituxan and steroid taper,  her labs this admit do not support active hemolysis. She does have iron def. We gave iron iv yesterday, will give another dose today. Hgb 8.1 today    Hypogammaglobulinemia. Orlando Nunez completes 3 day course of ivig today    Respiratory infection. . recent COVID  PNA now dealing with sequelae. . defer to primary team.  Herpetic facial infection.   On acyclovir  Laura Mckeon MD

## 2023-03-03 NOTE — PROGRESS NOTES
[facility] Adult  Hospitalist Group                                                                                          Hospitalist Progress Note  Maryann Lopez MD  Answering service: [phone] OR [phone] from in house phone        Date of Service:  3/3/2023  NAME:  Darline Oliveira  :  1933  MRN:  [id number]      Admission Summary:   80 y.o. female with history of CLL status post chemo, history of COVID infection January-2023, chronic HFpEF was brought in by EMS with shortness of breath, cough/chest congestion, requiring increasing oxygen support. Patient had COVID infection 2023 treated with Paxlovid. She was admitted in 2023 with COVID and discharged with dexamethasone and supplemental O2 with 2 L nasal cannula. Since discharge she has been increasingly more short of breath requiring increased supplemental oxygen use. She had a cardioversion done by her cardiologist at [facility] on  and is now using up to 4 L oxygen at home. Patient had a mild fever of 100.2 earlier today but denies any nausea vomiting chills diarrhea constipation dysuria chest pain recent falls injuries syncope abdominal pains. Daughter also reports that patient's feet and legs have been very swollen. Per EMS, patient was saturating 81% on 4 LNC at home which improved to 94% on 6L on arrival.  CXR in the ED showed worsening bilateral infiltrates and bilateral pleural effusions. The ED she received cefepime 2 g IV x1. EDMD wanted to add levofloxacin 750 mg IV x1 but patient's family refused due to patient's history of tendon rupture. Interval history / Subjective:   Feels better today, breathing improved, leg swelling improved, tolerating IVIG. Daughter at bedside. The pt remains on O2. Taken off later in the day. Was on O2 PTA since her COVID infection.      Assessment & Plan:     pneumonia and suspected worsening pulmonary fibrosis related to COVID  Patient was hospitalized February 2023 for COVID 19 pneumonia  Cont IV antibiotics, PO steroids, oxygen at 2L; pulmonary consulted and recs noted  CT chest reviewed with family- IVIG started    Acute on chronic HFpEF- pulmonary HTN - likely related to worsening lung disease  elevated BNP cont diuresis - swtiched from IV BID to daily PO-  TTE unremarkable     H/o CLL  Patient seen by  oncology - recs noted; IVIG started 3/1, also getting IV iron     H/o COVID infection Jan-Feb 2023- patient now off precautions     Code status: DNR  Prophylaxis: eliquis  Care Plan discussed with: patient and family RN CM  Anticipated Disposition: home tomorrow     Hospital Problems  Never Reviewed            Codes Class Noted POA    Hospital-acquired pneumonia ICD-10-CM: J18.9, Y95  ICD-9-CM: 576  2/26/2023 Unknown       Review of Systems:   A comprehensive review of systems was negative except for that written in the HPI. Vital Signs:    Last 24hrs VS reviewed since prior progress note.  Most recent are:  Visit Vitals  /64 (BP 1 Location: Left upper arm, BP Patient Position: At rest)   Pulse 63   Temp 97.7 °F (36.5 °C)   Resp 18   Ht 4' 11\" (1.499 m)   Wt 45.4 kg (100 lb)   SpO2 95%   BMI 20.20 kg/m²     Patient Vitals for the past 24 hrs:   Temp Pulse Resp BP SpO2   03/03/23 1250 97.7 °F (36.5 °C) 63 18 133/64 95 %   03/03/23 1200 -- 65 -- -- --   03/03/23 1150 97.6 °F (36.4 °C) 65 18 (!) 122/47 96 %   03/03/23 1135 98 °F (36.7 °C) 70 18 136/62 100 %   03/03/23 1120 98.5 °F (36.9 °C) 67 18 123/60 96 %   03/03/23 1006 -- -- -- -- 94 %   03/03/23 1002 -- -- -- -- (!) 88 %   03/03/23 1000 -- 76 -- -- --   03/03/23 0958 -- -- -- (!) 136/55 97 %   03/03/23 0954 -- -- -- (!) 138/52 --   03/03/23 0950 -- -- -- -- 95 %   03/03/23 0822 97.7 °F (36.5 °C) 69 18 130/78 98 %   03/03/23 0558 -- 69 -- -- --   03/03/23 0400 -- 61 -- -- --   03/03/23 0253 98.2 °F (36.8 °C) 72 18 (!) 116/59 95 %   03/03/23 0200 -- 69 -- -- -- 03/03/23 0020 98.4 °F (36.9 °C) 72 18 128/69 97 %   03/02/23 2246 -- -- -- 138/68 --   03/02/23 2200 -- 69 -- -- --   03/02/23 2030 -- -- -- -- 96 %   03/02/23 2000 -- 77 -- -- --   03/02/23 1927 97.7 °F (36.5 °C) 71 18 124/66 96 %   03/02/23 1811 -- 75 -- -- --   03/02/23 1434 -- -- -- (!) 108/57 --   03/02/23 1423 -- 74 -- -- --   03/02/23 1409 97.9 °F (36.6 °C) 77 18 (!) 108/57 96 %          Intake/Output Summary (Last 24 hours) at 3/3/2023 1330  Last data filed at 3/3/2023 0740  Gross per 24 hour   Intake --   Output 1150 ml   Net -1150 ml          Physical Examination:     I had a face to face encounter with this patient and independently examined them on 3/3/2023 as outlined below:          Constitutional:  No acute distress, cooperative, pleasant    ENT:  Oral mucosa moist, oropharynx benign, O2 via NC. Resp:  CTA b/l normal work of breathing    CV:  Regular rhythm, normal rate, no murmurs, gallops, rubs    GI:  Soft, non distended, non tender. normoactive bowel sounds     Musculoskeletal:  No edema, warm, 2+ pulses throughout    Neurologic:  Moves all extremities. AAOx3, CN II-XII reviewed            Data Review:    Review and/or order of clinical lab test  Review and/or order of tests in the radiology section of CPT  Review and/or order of tests in the medicine section of CPT  CXR Results  (Last 48 hours)      None               Labs:     Recent Labs     03/03/23  0519 03/02/23  0432   WBC 15.1* 15.0*   HGB 8.1* 8.8*   HCT 25.8* 28.6*    360       Recent Labs     03/03/23  0519      K 4.0      CO2 30   BUN 21*   CREA 0.41*   *   CA 9.8       No results for input(s): ALT, AP, TBIL, TBILI, TP, ALB, GLOB, GGT, AML, LPSE in the last 72 hours. No lab exists for component: SGOT, GPT, AMYP, HLPSE    No results for input(s): INR, PTP, APTT, INREXT, INREXT in the last 72 hours.    Recent Labs     03/02/23  0432 03/01/23  0329   TIBC  --  247*   PSAT  --  11*   FERR 279  -- No results found for: FOL, RBCF   No results for input(s): PH, PCO2, PO2 in the last 72 hours. No results for input(s): CPK, CKNDX, TROIQ in the last 72 hours.     No lab exists for component: CPKMB  No results found for: CHOL, CHOLX, CHLST, CHOLV, HDL, HDLP, LDL, LDLC, DLDLP, TGLX, TRIGL, TRIGP, CHHD, CHHDX  No results found for: [facility]  Lab Results   Component Value Date/Time    Color YELLOW/STRAW 02/02/2023 07:56 PM    Appearance CLEAR 02/02/2023 07:56 PM    Specific gravity 1.020 02/02/2023 07:56 PM    pH (UA) 5.5 02/02/2023 07:56 PM    Protein Negative 02/02/2023 07:56 PM    Glucose Negative 02/02/2023 07:56 PM    Ketone Negative 02/02/2023 07:56 PM    Bilirubin Negative 02/02/2023 07:56 PM    Urobilinogen 1.0 02/02/2023 07:56 PM    Nitrites Negative 02/02/2023 07:56 PM    Leukocyte Esterase SMALL (A) 02/02/2023 07:56 PM    Epithelial cells MODERATE (A) 02/02/2023 07:56 PM    Bacteria Negative 02/02/2023 07:56 PM    WBC 10-20 02/02/2023 07:56 PM    RBC 0-5 02/02/2023 07:56 PM         Medications Reviewed:     Current Facility-Administered Medications   Medication Dose Route Frequency    acyclovir (ZOVIRAX) capsule 400 mg  400 mg Oral QID    furosemide (LASIX) tablet 20 mg  20 mg Oral DAILY    acetaminophen (TYLENOL) tablet 650 mg  650 mg Oral Q4H PRN    dexAMETHasone (DECADRON) tablet 4 mg  4 mg Oral DAILY    acetaminophen (TYLENOL) tablet 650 mg  650 mg Oral QID    sodium chloride (NS) flush 5-40 mL  5-40 mL IntraVENous Q8H    sodium chloride (NS) flush 5-40 mL  5-40 mL IntraVENous PRN    acetaminophen (TYLENOL) tablet 650 mg  650 mg Oral Q4H PRN    cefepime (MAXIPIME) 2 g in 0.9% sodium chloride (MBP/ADV) 100 mL MBP  2 g IntraVENous Q12H    aspirin chewable tablet 81 mg  81 mg Oral DAILY    guaiFENesin ER (MUCINEX) tablet 600 mg  600 mg Oral Q12H    metoprolol tartrate (LOPRESSOR) tablet 25 mg  25 mg Oral Q12H    polyethylene glycol (MIRALAX) packet 17 g  17 g Oral DAILY PRN    aluminum-magnesium hydroxide (MAALOX) oral suspension 30 mL  30 mL Oral QID PRN    apixaban (ELIQUIS) tablet 2.5 mg  2.5 mg Oral BID     ______________________________________________________________________  EXPECTED LENGTH OF STAY: 5d 4h  ACTUAL LENGTH OF STAY:          5                 Nivia Brody MD

## 2023-03-03 NOTE — PROGRESS NOTES
Attempted to schedule hospital follow up PCP appointment. Office /Nurse will follow up with PCP to Secure appointment and contact the patient with appointment information. Pending patient discharge.  Bia Dennis, Care Management Assistant

## 2023-03-03 NOTE — PROGRESS NOTES
Hospital follow-up PCP transitional care appointment has been scheduled with Dr. Ace Mcmillna for Friday, March 10th, 2023 at 9:40 a.m. Vania Vidal Pending patient discharge.   Ortiz Block, Care Management Assistant

## 2023-03-03 NOTE — PROGRESS NOTES
I prayed with Darnell Flores her son and cousin and celebrated with Darnell Flores the [address]. I also gave them Communion.   Father Betsy Chen

## 2023-03-03 NOTE — PROGRESS NOTES
Problem: Self Care Deficits Care Plan (Adult)  Goal: *Acute Goals and Plan of Care (Insert Text)  Description:   FUNCTIONAL STATUS PRIOR TO ADMISSION: Patient lives in 1st floor of home with 24/7 supervision. Was independent in basic ADL dressing/grooming. Requires assist for bathing for safety (children assist). Daughters/sons assist with all IADL. Was on RA ~1 month ago, however as of 2 weeks ago was using 2L to maintain SpO2. HOME SUPPORT: Lived with son/daughter in law, requires assist for most IADL, some ADL    Occupational Therapy Goals  Initiated 2/27/2023  1. Patient will perform grooming with modified independence within 7 day(s). 2.  Patient will perform upper body dressing with independence within 7 day(s). 3.  Patient will perform lower body dressing with modified independence within 7 day(s). 4.  Patient will perform all aspects of toileting with modified independence within 7 day(s). 5.  Patient will utilize energy conservation techniques during functional activities with verbal cues within 7 day(s). Outcome: Progressing Towards Goal   OCCUPATIONAL THERAPY TREATMENT  Patient: Yair Corral (38 y.o. female)  Date: 3/3/2023  Diagnosis: Hospital-acquired pneumonia [J18.9, Y95] <principal problem not specified>      Precautions: Fall (Droplet+)  Chart, occupational therapy assessment, plan of care, and goals were reviewed. ASSESSMENT  Patient continues with skilled OT services and is progressing towards goals. Patient demonstrates improving standing tolerance and balance during Adl tasks, and improving activity tolerance. Patient received on 1.5L/min via nasal cannula and maintained SPO2 >95 with all activity including marching in stand. Trialed seated exercises on room air- desat to 88%, recovers to >90% within 1 min of seated rest break and pursed lip breathing. Maintains SPO2 93-95% on room air with no activity but normal conversation.  Left patient on room air with instruction to don nasal cannula if needed for SOB and alert nurse. Nurse made aware. Current Level of Function Impacting Discharge (ADLs): CGA to supervision/set up for bathing, dressing, toileting     Other factors to consider for discharge:            PLAN :  Patient continues to benefit from skilled intervention to address the above impairments. Continue treatment per established plan of care to address goals. Recommend with staff: ambulate to bathroom for toileting, encourage ADL independence     Recommend next OT session: grooming in stand, continue room air trials     Recommendation for discharge: (in order for the patient to meet his/her long term goals)  Occupational therapy at least 2 days/week in the home     This discharge recommendation:  Has been made in collaboration with the attending provider and/or case management    IF patient discharges home will need the following DME: none       SUBJECTIVE:   Patient pleasant and cooperative     OBJECTIVE DATA SUMMARY:   Cognitive/Behavioral Status:  Neurologic State: Alert  Orientation Level: Oriented X4  Cognition: Follows commands             Functional Mobility and Transfers for ADLs:  Bed Mobility:       Transfers:  Sit to Stand: Supervision          Balance:  Sitting: Intact; Without support  Standing: Impaired; Without support  Standing - Static: Good; Unsupported  Standing - Dynamic : Fair;Unsupported    ADL Intervention:             Lower Body Dressing Assistance  Dressing Assistance: Contact guard assistance  Position Performed: Standing;Seated in chair              Therapeutic Exercises:   Patient instructed in seated AROM exercises (shoulder flexion, toe/heel lifts, marching, knee extension) completed with and without supplemental O2. 2 sets 5-10 on 1/5L./min maintained SPO2 >94%.   trialed a brief set on room air and desat to 88%    Pain:  No complaints     Activity Tolerance:   observed SOB with activity    After treatment patient left in no apparent distress:   Sitting in chair, Call bell within reach, and Caregiver / family present    COMMUNICATION/COLLABORATION:   The patients plan of care was discussed with: Physical therapist and Registered nurse.      Cinthya Murray OT  Time Calculation: 30 mins

## 2023-03-04 VITALS
DIASTOLIC BLOOD PRESSURE: 73 MMHG | OXYGEN SATURATION: 99 % | RESPIRATION RATE: 16 BRPM | BODY MASS INDEX: 22.4 KG/M2 | HEIGHT: 59 IN | WEIGHT: 111.1 LBS | HEART RATE: 72 BPM | TEMPERATURE: 97.8 F | SYSTOLIC BLOOD PRESSURE: 133 MMHG

## 2023-03-04 LAB
ANION GAP SERPL CALC-SCNC: 7 MMOL/L (ref 5–15)
BASOPHILS # BLD: 0 K/UL (ref 0–0.1)
BASOPHILS NFR BLD: 0 % (ref 0–1)
BUN SERPL-MCNC: 29 MG/DL (ref 6–20)
BUN/CREAT SERPL: 78 (ref 12–20)
CALCIUM SERPL-MCNC: 9.8 MG/DL (ref 8.5–10.1)
CHLORIDE SERPL-SCNC: 99 MMOL/L (ref 97–108)
CO2 SERPL-SCNC: 28 MMOL/L (ref 21–32)
CREAT SERPL-MCNC: 0.37 MG/DL (ref 0.55–1.02)
DIFFERENTIAL METHOD BLD: ABNORMAL
EOSINOPHIL # BLD: 0 K/UL (ref 0–0.4)
EOSINOPHIL NFR BLD: 0 % (ref 0–7)
ERYTHROCYTE [DISTWIDTH] IN BLOOD BY AUTOMATED COUNT: 16.4 % (ref 11.5–14.5)
GLUCOSE SERPL-MCNC: 104 MG/DL (ref 65–100)
HCT VFR BLD AUTO: 22.9 % (ref 35–47)
HGB BLD-MCNC: 7.4 G/DL (ref 11.5–16)
IMM GRANULOCYTES # BLD AUTO: 0 K/UL
IMM GRANULOCYTES NFR BLD AUTO: 0 %
LYMPHOCYTES # BLD: 13.7 K/UL (ref 0.8–3.5)
LYMPHOCYTES NFR BLD: 63 % (ref 12–49)
MAGNESIUM SERPL-MCNC: 2 MG/DL (ref 1.6–2.4)
MCH RBC QN AUTO: 26.7 PG (ref 26–34)
MCHC RBC AUTO-ENTMCNC: 32.3 G/DL (ref 30–36.5)
MCV RBC AUTO: 82.7 FL (ref 80–99)
MONOCYTES # BLD: 0.9 K/UL (ref 0–1)
MONOCYTES NFR BLD: 4 % (ref 5–13)
NEUTS BAND NFR BLD MANUAL: 2 % (ref 0–6)
NEUTS SEG # BLD: 7.2 K/UL (ref 1.8–8)
NEUTS SEG NFR BLD: 31 % (ref 32–75)
NRBC # BLD: 0.04 K/UL (ref 0–0.01)
NRBC BLD-RTO: 0.2 PER 100 WBC
PLATELET # BLD AUTO: 317 K/UL (ref 150–400)
PMV BLD AUTO: 8.9 FL (ref 8.9–12.9)
POTASSIUM SERPL-SCNC: 4.1 MMOL/L (ref 3.5–5.1)
RBC # BLD AUTO: 2.77 M/UL (ref 3.8–5.2)
RBC MORPH BLD: ABNORMAL
SODIUM SERPL-SCNC: 134 MMOL/L (ref 136–145)
WBC # BLD AUTO: 21.8 K/UL (ref 3.6–11)

## 2023-03-04 PROCEDURE — 36415 COLL VENOUS BLD VENIPUNCTURE: CPT

## 2023-03-04 PROCEDURE — 85025 COMPLETE CBC W/AUTO DIFF WBC: CPT

## 2023-03-04 PROCEDURE — 74011250637 HC RX REV CODE- 250/637

## 2023-03-04 PROCEDURE — 83735 ASSAY OF MAGNESIUM: CPT

## 2023-03-04 PROCEDURE — 74011000258 HC RX REV CODE- 258: Performed by: INTERNAL MEDICINE

## 2023-03-04 PROCEDURE — 74011250637 HC RX REV CODE- 250/637: Performed by: INTERNAL MEDICINE

## 2023-03-04 PROCEDURE — 74011000250 HC RX REV CODE- 250: Performed by: INTERNAL MEDICINE

## 2023-03-04 PROCEDURE — 74011250636 HC RX REV CODE- 250/636: Performed by: INTERNAL MEDICINE

## 2023-03-04 PROCEDURE — 74011250637 HC RX REV CODE- 250/637: Performed by: FAMILY MEDICINE

## 2023-03-04 PROCEDURE — 74011250636 HC RX REV CODE- 250/636: Performed by: PHYSICIAN ASSISTANT

## 2023-03-04 PROCEDURE — 80048 BASIC METABOLIC PNL TOTAL CA: CPT

## 2023-03-04 RX ORDER — ACYCLOVIR 200 MG/1
400 CAPSULE ORAL 4 TIMES DAILY
Qty: 20 CAPSULE | Refills: 0 | Status: SHIPPED | OUTPATIENT
Start: 2023-03-04 | End: 2023-03-07

## 2023-03-04 RX ORDER — FUROSEMIDE 20 MG/1
20 TABLET ORAL DAILY
Qty: 30 TABLET | Refills: 0 | Status: SHIPPED | OUTPATIENT
Start: 2023-03-04 | End: 2023-04-03

## 2023-03-04 RX ORDER — DEXAMETHASONE 4 MG/1
4 TABLET ORAL 2 TIMES DAILY WITH MEALS
Qty: 6 TABLET | Refills: 0 | Status: SHIPPED | OUTPATIENT
Start: 2023-03-05 | End: 2023-03-08

## 2023-03-04 RX ADMIN — METOPROLOL TARTRATE 25 MG: 25 TABLET, FILM COATED ORAL at 08:53

## 2023-03-04 RX ADMIN — ASPIRIN 81 MG CHEWABLE TABLET 81 MG: 81 TABLET CHEWABLE at 08:52

## 2023-03-04 RX ADMIN — GUAIFENESIN 600 MG: 600 TABLET, EXTENDED RELEASE ORAL at 08:52

## 2023-03-04 RX ADMIN — ACYCLOVIR 400 MG: 200 CAPSULE ORAL at 05:14

## 2023-03-04 RX ADMIN — ACETAMINOPHEN 650 MG: 325 TABLET ORAL at 05:14

## 2023-03-04 RX ADMIN — APIXABAN 2.5 MG: 2.5 TABLET, FILM COATED ORAL at 08:53

## 2023-03-04 RX ADMIN — DEXAMETHASONE 4 MG: 4 TABLET ORAL at 08:53

## 2023-03-04 RX ADMIN — ACETAMINOPHEN 650 MG: 325 TABLET ORAL at 08:52

## 2023-03-04 RX ADMIN — ACYCLOVIR 400 MG: 200 CAPSULE ORAL at 11:49

## 2023-03-04 RX ADMIN — Medication 10 ML: at 06:16

## 2023-03-04 RX ADMIN — FUROSEMIDE 20 MG: 20 TABLET ORAL at 08:52

## 2023-03-04 RX ADMIN — CEFEPIME 2 G: 2 INJECTION, POWDER, FOR SOLUTION INTRAVENOUS at 05:15

## 2023-03-04 NOTE — PROGRESS NOTES
Problem: Risk for Spread of Infection  Goal: Prevent transmission of infectious organism to others  Description: Prevent the transmission of infectious organisms to other patients, staff members, and visitors. Outcome: Progressing Towards Goal     Problem: Patient Education:  Go to Education Activity  Goal: Patient/Family Education  Outcome: Progressing Towards Goal     Problem: Falls - Risk of  Goal: *Absence of Falls  Description: Document Jose Núñez Fall Risk and appropriate interventions in the flowsheet.   Outcome: Progressing Towards Goal  Note: Fall Risk Interventions:  Mobility Interventions: Bed/chair exit alarm, PT Consult for mobility concerns, Patient to call before getting OOB, Utilize walker, cane, or other assistive device, Utilize gait belt for transfers/ambulation         Medication Interventions: Bed/chair exit alarm, Evaluate medications/consider consulting pharmacy, Patient to call before getting OOB, Teach patient to arise slowly, Utilize gait belt for transfers/ambulation    Elimination Interventions: Call light in reach, Elevated toilet seat, Patient to call for help with toileting needs, Stay With Me (per policy), Toilet paper/wipes in reach, Toileting schedule/hourly rounds              Problem: Patient Education: Go to Patient Education Activity  Goal: Patient/Family Education  Outcome: Progressing Towards Goal     Problem: Patient Education: Go to Patient Education Activity  Goal: Patient/Family Education  Outcome: Progressing Towards Goal     Problem: Patient Education: Go to Patient Education Activity  Goal: Patient/Family Education  Outcome: Progressing Towards Goal     Problem: Patient Education: Go to Patient Education Activity  Goal: Patient/Family Education  Outcome: Progressing Towards Goal     Problem: Heart Failure: Day 2  Goal: Activity/Safety  Outcome: Progressing Towards Goal     Problem: Patient Education: Go to Patient Education Activity  Goal: Patient/Family Education  Outcome: Progressing Towards Goal     Problem: Pneumonia: Day 1  Goal: Off Pathway (Use only if patient is Off Pathway)  Outcome: Progressing Towards Goal     Problem: Pneumonia: Day 2  Goal: Off Pathway (Use only if patient is Off Pathway)  Outcome: Progressing Towards Goal  Goal: Activity/Safety  Outcome: Progressing Towards Goal  Goal: Consults, if ordered  Outcome: Progressing Towards Goal  Goal: Diagnostic Test/Procedures  Outcome: Progressing Towards Goal  Goal: Nutrition/Diet  Outcome: Progressing Towards Goal  Goal: Discharge Planning  Outcome: Progressing Towards Goal  Goal: Medications  Outcome: Progressing Towards Goal  Goal: Respiratory  Outcome: Progressing Towards Goal  Goal: Treatments/Interventions/Procedures  Outcome: Progressing Towards Goal  Goal: Psychosocial  Outcome: Progressing Towards Goal  Goal: *Oxygen saturation within defined limits  Outcome: Progressing Towards Goal  Goal: *Hemodynamically stable  Outcome: Progressing Towards Goal  Goal: *Demonstrates progressive activity  Outcome: Progressing Towards Goal  Goal: *Tolerating diet  Outcome: Progressing Towards Goal  Goal: *Optimal pain control at patient's stated goal  Outcome: Progressing Towards Goal     Problem: Heart Failure: Day 3  Goal: Off Pathway (Use only if patient is Off Pathway)  Outcome: Progressing Towards Goal  Goal: Activity/Safety  Outcome: Progressing Towards Goal  Goal: Diagnostic Test/Procedures  Outcome: Progressing Towards Goal  Goal: Nutrition/Diet  Outcome: Progressing Towards Goal  Goal: Discharge Planning  Outcome: Progressing Towards Goal  Goal: Medications  Outcome: Progressing Towards Goal  Goal: Respiratory  Outcome: Progressing Towards Goal  Goal: Treatments/Interventions/Procedures  Outcome: Progressing Towards Goal  Goal: Psychosocial  Outcome: Progressing Towards Goal  Goal: *Oxygen saturation within defined limits  Outcome: Progressing Towards Goal  Goal: *Hemodynamically stable  Outcome: Progressing Towards Goal  Goal: *Optimal pain control at patient's stated goal  Outcome: Progressing Towards Goal  Goal: *Anxiety reduced or absent  Outcome: Progressing Towards Goal  Goal: *Demonstrates progressive activity  Outcome: Progressing Towards Goal     Problem: Heart Failure: Day 4  Goal: Off Pathway (Use only if patient is Off Pathway)  Outcome: Progressing Towards Goal  Goal: Activity/Safety  Outcome: Progressing Towards Goal  Goal: Diagnostic Test/Procedures  Outcome: Progressing Towards Goal  Goal: Nutrition/Diet  Outcome: Progressing Towards Goal  Goal: Discharge Planning  Outcome: Progressing Towards Goal  Goal: Medications  Outcome: Progressing Towards Goal  Goal: Respiratory  Outcome: Progressing Towards Goal  Goal: Treatments/Interventions/Procedures  Outcome: Progressing Towards Goal  Goal: Psychosocial  Outcome: Progressing Towards Goal  Goal: *Oxygen saturation within defined limits  Outcome: Progressing Towards Goal  Goal: *Hemodynamically stable  Outcome: Progressing Towards Goal  Goal: *Optimal pain control at patient's stated goal  Outcome: Progressing Towards Goal  Goal: *Anxiety reduced or absent  Outcome: Progressing Towards Goal  Goal: *Demonstrates progressive activity  Outcome: Progressing Towards Goal     Problem: Heart Failure: Day 5  Goal: Off Pathway (Use only if patient is Off Pathway)  Outcome: Progressing Towards Goal  Goal: Activity/Safety  Outcome: Progressing Towards Goal  Goal: Diagnostic Test/Procedures  Outcome: Progressing Towards Goal  Goal: Nutrition/Diet  Outcome: Progressing Towards Goal  Goal: Discharge Planning  Outcome: Progressing Towards Goal  Goal: Medications  Outcome: Progressing Towards Goal  Goal: Respiratory  Outcome: Progressing Towards Goal  Goal: Treatments/Interventions/Procedures  Outcome: Progressing Towards Goal  Goal: Psychosocial  Outcome: Progressing Towards Goal     Problem: Heart Failure: Discharge Outcomes  Goal: *Demonstrates ability to perform prescribed activity without shortness of breath or discomfort  Outcome: Progressing Towards Goal  Goal: *Left ventricular function assessment completed prior to or during stay, or planned for post-discharge  Outcome: Progressing Towards Goal  Goal: *ACEI prescribed if LVEF less than 40% and no contraindications or ARB prescribed  Outcome: Progressing Towards Goal  Goal: *Verbalizes understanding and describes prescribed diet  Outcome: Progressing Towards Goal  Goal: *Verbalizes understanding/describes prescribed medications  Outcome: Progressing Towards Goal  Goal: *Describes available resources and support systems  Description: (eg: Home Health, Palliative Care, Advanced Medical Directive)  Outcome: Progressing Towards Goal  Goal: *Describes smoking cessation resources  Outcome: Progressing Towards Goal  Goal: *Understands and describes signs and symptoms to report to providers(Stroke Metric)  Outcome: Progressing Towards Goal  Goal: *Describes/verbalizes understanding of follow-up/return appt  Description: (eg: to physicians, diabetes treatment coordinator, and other resources  Outcome: Progressing Towards Goal  Goal: *Describes importance of continuing daily weights and changes to report to physician  Outcome: Progressing Towards Goal

## 2023-03-04 NOTE — DISCHARGE SUMMARY
Discharge Summary     Patient: Alberto Haley MRN: [id number]  SSN: [id number]    YOB: 1933  Age: 80 y.o. Sex: female       Admit Date: 2/26/2023    Discharge Date: 3/4/2023      Admission Diagnoses: Hospital-acquired pneumonia [J18.9, Y95]    Discharge Diagnoses:   HCAP  Pulmonary edema  CLL  Recent COVID-19 infection    Problem List as of 3/4/2023 Never Reviewed            Codes Class Noted - Resolved    Hospital-acquired pneumonia ICD-10-CM: J18.9, Y95  ICD-9-CM: 486  2/26/2023 - Present        Elevated C-reactive protein (CRP) ICD-10-CM: R79.82  ICD-9-CM: 790.95  2/2/2023 - Present        CLL (chronic lymphocytic leukemia) (HCC) ICD-10-CM: C91.10  ICD-9-CM: 204.10  2/2/2023 - Present        Atypical pneumonia ICD-10-CM: J18.9  ICD-9-CM: 486  2/2/2023 - Present        Bilateral pleural effusion ICD-10-CM: J90  ICD-9-CM: 511.9  2/2/2023 - Present        Elevated brain natriuretic peptide (BNP) level ICD-10-CM: R79.89  ICD-9-CM: 790.99  2/2/2023 - Present        Failure of outpatient treatment ICD-10-CM: Z78.9  ICD-9-CM: V49.89  2/2/2023 - Present        Post-acute sequelae of COVID-19 (PASC) ICD-10-CM: U09.9  ICD-9-CM: 139.8  2/2/2023 - Present            Discharge Condition: Stable    Hospital Course: 80 y.o lady w/ CLL s/p chemo, recent COVID-19 infection discharged on home O2, stress-induced cardiomyopathy, who presented with shortness of breath. 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 She was treated for HCAP with IV cefepime and azithromycin, as well as IV diuretics. Her symptoms improved to near baseline and she is back on her PTA home O2. Repeat echo showed a normal LVEF compared to an echo 1 month prior showing depressed LVEF and grade I diastolic dysfunction. Patient will follow-up with pulm as an outpatient. Her volume status should be assessed on follow-up and Lasix adjusted as indicated.     Seen by heme/onc here and given IVIG and IV iron. 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 Consults: Hematology/Oncology and Pulmonary/Critical Care    Significant Diagnostic Studies: see above  CT CHEST WO CONT    Result Date: 2/28/2023  1. The dense areas of groundglass opacity and consolidation previously seen in each upper lobe right greater than left representing pneumonia have decreased considerably. However, there are new areas of disease present especially in the apical segments of both upper lobes right greater than left. This is predominantly groundglass in opacity and is associated with smooth thickening of interlobular septa. Most likely this is due to pulmonary edema rather than new areas of infection. Clinical correlation follow-up is suggested. Correlation with fluid balance is suggested. 2. Increasing bilateral pleural effusions are noted bilaterally which cause increasing basilar atelectasis of moderate degree. XR CHEST PORT    Result Date: 2/26/2023  1. Worsening airspace disease and small bilateral pleural effusions       Disposition: home    S: no acute events overnight, feels better overall, wants to go home. Some ankle edema    Visit Vitals  BP (!) 154/69 (BP 1 Location: Right upper arm, BP Patient Position: Sitting)   Pulse 72   Temp 98.1 °F (36.7 °C)   Resp 16   Ht 4' 11\" (1.499 m)   Wt 50.4 kg (111 lb 1.6 oz)   SpO2 97%   BMI 22.44 kg/m²     NAD  RRR  Lungs w/ minimal bibasilar crackles normal work of breathing   Abd soft NT   Trace b/l LE edema    Discharge Medications:   Current Discharge Medication List        START taking these medications    Details   acyclovir (ZOVIRAX) 200 mg capsule Take 2 Capsules by mouth four (4) times daily for 10 doses. Indications: a herpes simplex infection of the skin, mouth or genitals  Qty: 20 Capsule, Refills: 0      furosemide (LASIX) 20 mg tablet Take 1 Tablet by mouth daily for 30 days.   Qty: 30 Tablet, Refills: 0           CONTINUE these medications which have CHANGED    Details   dexAMETHasone (DECADRON) 4 mg tablet Take 4 mg by mouth two (2) times daily (with meals) for 3 days. Qty: 6 Tablet, Refills: 0           CONTINUE these medications which have NOT CHANGED    Details   fenofibrate (LOFIBRA) 160 mg tablet Take 160 mg by mouth daily. esomeprazole (NexIUM) 20 mg capsule Take 20 mg by mouth daily. cholecalciferol (Vitamin D3) 25 mcg (1,000 unit) cap Take 1,000 Units by mouth daily. ascorbic acid, vitamin C, (Vitamin C) 500 mg tablet Take 500 mg by mouth daily. apixaban (ELIQUIS) 2.5 mg tablet Take 2.5 mg by mouth two (2) times a day. polyethylene glycol (Miralax) 17 gram/dose powder Take 17 g by mouth nightly. aluminum hydrox-magnesium carb (Gaviscon)  mg/15 mL suspension Take 15 mL by mouth every six (6) hours as needed for Indigestion. aspirin 81 mg chewable tablet Take 1 Tablet by mouth daily. Qty: 30 Tablet, Refills: 0      guaiFENesin ER (MUCINEX) 600 mg ER tablet Take 1 Tablet by mouth every twelve (12) hours. Qty: 30 Tablet, Refills: 0      metoprolol tartrate (LOPRESSOR) 25 mg tablet Take 1 Tablet by mouth every twelve (12) hours. Qty: 60 Tablet, Refills: 0      potassium chloride (K-DUR, KLOR-CON M20) 20 mEq tablet Take 1 Tablet by mouth daily. Qty: 15 Tablet, Refills: 0           STOP taking these medications       azithromycin (ZITHROMAX) 250 mg tablet Comments:   Reason for Stopping:         cefdinir (OMNICEF) 300 mg capsule Comments:   Reason for Stopping:              Follow-up Information       Follow up With Specialties Details Why Mati Velazquez MD Internal Medicine Physician Follow up Hospital follow up with PCP scheduled for Friday, March 10th, 2023 at 9:40 a.m. [address]      Prosper Mccauley MD Internal Medicine Physician, Pulmonary Disease Schedule an appointment as soon as possible for a visit in 1 week(s) office should call you to set up follow-up within 1-2 weeks [id number] [address] [id number] [facility]      Jeaneth Goodman MD Hematology and Oncology, Hematology Physician, Oncology Follow up  [id number] [facility] [place]               Signed By: Noemy Shafer MD     March 4, 2023      Greater than 30 minutes spent on discharge management.

## 2023-03-04 NOTE — PROGRESS NOTES
Problem: Risk for Spread of Infection  Goal: Prevent transmission of infectious organism to others  Description: Prevent the transmission of infectious organisms to other patients, staff members, and visitors. Outcome: Progressing Towards Goal     Problem: Patient Education:  Go to Education Activity  Goal: Patient/Family Education  Outcome: Progressing Towards Goal     Problem: Falls - Risk of  Goal: *Absence of Falls  Description: Document Merrill Ballesteros Fall Risk and appropriate interventions in the flowsheet.   Outcome: Progressing Towards Goal  Note: Fall Risk Interventions:  Mobility Interventions: Bed/chair exit alarm, PT Consult for mobility concerns, Patient to call before getting OOB, Utilize walker, cane, or other assistive device, Utilize gait belt for transfers/ambulation         Medication Interventions: Bed/chair exit alarm, Evaluate medications/consider consulting pharmacy, Patient to call before getting OOB, Teach patient to arise slowly, Utilize gait belt for transfers/ambulation    Elimination Interventions: Call light in reach, Elevated toilet seat, Patient to call for help with toileting needs, Stay With Me (per policy), Toilet paper/wipes in reach, Toileting schedule/hourly rounds              Problem: Patient Education: Go to Patient Education Activity  Goal: Patient/Family Education  Outcome: Progressing Towards Goal     Problem: Patient Education: Go to Patient Education Activity  Goal: Patient/Family Education  Outcome: Progressing Towards Goal     Problem: Patient Education: Go to Patient Education Activity  Goal: Patient/Family Education  Outcome: Progressing Towards Goal     Problem: Patient Education: Go to Patient Education Activity  Goal: Patient/Family Education  Outcome: Progressing Towards Goal     Problem: Heart Failure: Day 2  Goal: Activity/Safety  Outcome: Progressing Towards Goal     Problem: Heart Failure: Day 3  Goal: Off Pathway (Use only if patient is Off Pathway)  Outcome: Progressing Towards Goal  Goal: Activity/Safety  Outcome: Progressing Towards Goal  Goal: Diagnostic Test/Procedures  Outcome: Progressing Towards Goal  Goal: Nutrition/Diet  Outcome: Progressing Towards Goal  Goal: Discharge Planning  Outcome: Progressing Towards Goal  Goal: Medications  Outcome: Progressing Towards Goal  Goal: Respiratory  Outcome: Progressing Towards Goal  Goal: Treatments/Interventions/Procedures  Outcome: Progressing Towards Goal  Goal: Psychosocial  Outcome: Progressing Towards Goal  Goal: *Oxygen saturation within defined limits  Outcome: Progressing Towards Goal  Goal: *Hemodynamically stable  Outcome: Progressing Towards Goal  Goal: *Optimal pain control at patient's stated goal  Outcome: Progressing Towards Goal  Goal: *Anxiety reduced or absent  Outcome: Progressing Towards Goal  Goal: *Demonstrates progressive activity  Outcome: Progressing Towards Goal     Problem: Heart Failure: Day 4  Goal: Off Pathway (Use only if patient is Off Pathway)  Outcome: Progressing Towards Goal  Goal: Activity/Safety  Outcome: Progressing Towards Goal  Goal: Diagnostic Test/Procedures  Outcome: Progressing Towards Goal  Goal: Nutrition/Diet  Outcome: Progressing Towards Goal  Goal: Discharge Planning  Outcome: Progressing Towards Goal  Goal: Medications  Outcome: Progressing Towards Goal  Goal: Respiratory  Outcome: Progressing Towards Goal  Goal: Treatments/Interventions/Procedures  Outcome: Progressing Towards Goal  Goal: Psychosocial  Outcome: Progressing Towards Goal  Goal: *Oxygen saturation within defined limits  Outcome: Progressing Towards Goal  Goal: *Hemodynamically stable  Outcome: Progressing Towards Goal  Goal: *Optimal pain control at patient's stated goal  Outcome: Progressing Towards Goal  Goal: *Anxiety reduced or absent  Outcome: Progressing Towards Goal  Goal: *Demonstrates progressive activity  Outcome: Progressing Towards Goal     Problem: Heart Failure: Day 5  Goal: Off Pathway (Use only if patient is Off Pathway)  Outcome: Progressing Towards Goal  Goal: Activity/Safety  Outcome: Progressing Towards Goal  Goal: Diagnostic Test/Procedures  Outcome: Progressing Towards Goal  Goal: Nutrition/Diet  Outcome: Progressing Towards Goal  Goal: Discharge Planning  Outcome: Progressing Towards Goal  Goal: Medications  Outcome: Progressing Towards Goal  Goal: Respiratory  Outcome: Progressing Towards Goal  Goal: Treatments/Interventions/Procedures  Outcome: Progressing Towards Goal  Goal: Psychosocial  Outcome: Progressing Towards Goal     Problem: Heart Failure: Discharge Outcomes  Goal: *Demonstrates ability to perform prescribed activity without shortness of breath or discomfort  Outcome: Progressing Towards Goal  Goal: *Left ventricular function assessment completed prior to or during stay, or planned for post-discharge  Outcome: Progressing Towards Goal  Goal: *ACEI prescribed if LVEF less than 40% and no contraindications or ARB prescribed  Outcome: Progressing Towards Goal  Goal: *Verbalizes understanding and describes prescribed diet  Outcome: Progressing Towards Goal  Goal: *Verbalizes understanding/describes prescribed medications  Outcome: Progressing Towards Goal  Goal: *Describes available resources and support systems  Description: (eg: Home Health, Palliative Care, Advanced Medical Directive)  Outcome: Progressing Towards Goal  Goal: *Describes smoking cessation resources  Outcome: Progressing Towards Goal  Goal: *Understands and describes signs and symptoms to report to providers(Stroke Metric)  Outcome: Progressing Towards Goal  Goal: *Describes/verbalizes understanding of follow-up/return appt  Description: (eg: to physicians, diabetes treatment coordinator, and other resources  Outcome: Progressing Towards Goal  Goal: *Describes importance of continuing daily weights and changes to report to physician  Outcome: Progressing Towards Goal     Problem: Patient Education: Go to Patient Education Activity  Goal: Patient/Family Education  Outcome: Progressing Towards Goal     Problem: Pneumonia: Day 1  Goal: Off Pathway (Use only if patient is Off Pathway)  Outcome: Progressing Towards Goal

## 2023-03-04 NOTE — PROGRESS NOTES
JOSE:  1. RUR-20%  2. Return home with family and Akilah Laundry. 3. Patient has home oxygen from previous admission supplied through [address]. CM met with patient and granddaughter to discuss discharge planning. Patient was alert and oriented x 4. CM verified that home health had been set up by patient's daughter in-law Page who is a nurse. The granddaughter said her mother had set it up prior to adm and that they will admit patient when she gets home. No other discharge needs identified.  Fiona Loera MSA, RN, CM

## 2023-03-04 NOTE — PROGRESS NOTES
Hematology-Oncology Progress Note    Jemma Harden  1/8/1933  [id number]  3/4/2023    Follow-up for: cll/anemia     [x]        Chart notes since last visit reviewed   [x]        Medications reviewed for allergies and interactions       Case discussed with the following:         []                            []        Nursing Staff                                                                         []        Pathologist                                                                        [x]        FAMILY      Subjective:     Spoke with patient who complains of: daughter at bedside and concerned pt's feet are swelling again.  NO other cmplaints    Objective:   Patient Vitals for the past 24 hrs:   BP Temp Pulse Resp SpO2   03/04/23 0752 (!) 154/69 98.1 °F (36.7 °C) 72 16 97 %   03/04/23 0600 -- -- 67 -- --   03/04/23 0400 -- -- 74 -- --   03/04/23 0200 -- -- 62 -- --   03/03/23 2336 (!) 114/57 97.8 °F (36.6 °C) 68 16 94 %   03/03/23 2200 -- -- 72 -- --   03/03/23 1956 121/62 98.8 °F (37.1 °C) 70 16 96 %   03/03/23 1800 -- -- 71 -- --   03/03/23 1604 125/60 97.9 °F (36.6 °C) 69 18 92 %   03/03/23 1400 -- -- 68 -- --   03/03/23 1350 (!) 125/56 98.3 °F (36.8 °C) 67 18 95 %   03/03/23 1250 133/64 97.7 °F (36.5 °C) 63 18 95 %   03/03/23 1200 -- -- 65 -- --   03/03/23 1150 (!) 122/47 97.6 °F (36.4 °C) 65 18 96 %   03/03/23 1135 136/62 98 °F (36.7 °C) 70 18 100 %   03/03/23 1120 123/60 98.5 °F (36.9 °C) 67 18 96 %   03/03/23 1006 -- -- -- -- 94 %   03/03/23 1002 -- -- -- -- (!) 88 %   03/03/23 1000 -- -- 76 -- --   03/03/23 0958 (!) 136/55 -- -- -- 97 %   03/03/23 0954 (!) 138/52 -- -- -- --   03/03/23 0950 -- -- -- -- 95 %       REVIEW OF SYSTEMS:    Constitutional: negative fever, negative chills, negative weight loss  Eyes:   negative visual changes  ENT:   negative sore throat, tongue or lip swelling  Respiratory:  negative cough, negative dyspnea  Cards:  negative for chest pain, palpitations, lower extremity edema  GI:   negative for nausea, vomiting, diarrhea, and abdominal pain  Neuro:  negative for headaches, dizziness, vertigo  []                        Full ROS o/w normal/non contributor    Constitutional:  Patient looks  []        Sick  []        Frail  [x]        Better                                                 []        Depressed    HEENT:  [x]   NC                         []   AT               []    ALOPECIA           Eyes: [x]   Normal               []    Icteric  Oropharynx: []    Normal                  []  Thrush               []   Dry  Mucositis: []    None                 Grade: []        I  []        II  []        III  []        IV  Neck:   []   Supple                  []  Rigid               JVD:    []   ABSENT       []   PRESENT  Lymphadenopathy:   []   None Noted            []   PRESENT    Chest:  Aerating we;;    CV:             []   Regular              []  Irregular               []   Tachy                []   Murmur  Abdominal:   []    Soft              []   NON-tender               []   Tender      BS:    []   ABSENT                   []   PRESENT  Liver:     []  NON-palp                  []   EDGE- palp  Spleen: []   NON-palp                   []  EDGE - palp  Mass:   []   ABSENT                          []  PRESENT  Extr:    []  Lymphedema             []   Cyanosis      []  Clubbing  Edema:     []   NONE       []   PRESENT  Skin:  Intact []           Purpura []        Rash: []   ABSENT       []  PRESENT  Neuro:  [x]        Normal  []        Confused      Available labs reviewed:  Labs:    Recent Results (from the past 24 hour(s))   CBC WITH AUTOMATED DIFF    Collection Time: 03/04/23 12:39 AM   Result Value Ref Range    WBC 21.8 (H) 3.6 - 11.0 K/uL    RBC 2.77 (L) 3.80 - 5.20 M/uL    HGB 7.4 (L) 11.5 - 16.0 g/dL    HCT 22.9 (L) 35.0 - 47.0 %    MCV 82.7 80.0 - 99.0 FL    MCH 26.7 26.0 - 34.0 PG    MCHC 32.3 30.0 - 36.5 g/dL    RDW 16.4 (H) 11.5 - 14.5 %    PLATELET 312 886 - 262 K/uL    MPV 8.9 8.9 - 12.9 FL    NRBC 0.2 (H) 0  WBC    ABSOLUTE NRBC 0.04 (H) 0.00 - 0.01 K/uL    NEUTROPHILS 31 (L) 32 - 75 %    BAND NEUTROPHILS 2 0 - 6 %    LYMPHOCYTES 63 (H) 12 - 49 %    MONOCYTES 4 (L) 5 - 13 %    EOSINOPHILS 0 0 - 7 %    BASOPHILS 0 0 - 1 %    IMMATURE GRANULOCYTES 0 %    ABS. NEUTROPHILS 7.2 1.8 - 8.0 K/UL    ABS. LYMPHOCYTES 13.7 (H) 0.8 - 3.5 K/UL    ABS. MONOCYTES 0.9 0.0 - 1.0 K/UL    ABS. EOSINOPHILS 0.0 0.0 - 0.4 K/UL    ABS. BASOPHILS 0.0 0.0 - 0.1 K/UL    ABS. IMM. GRANS. 0.0 K/UL    DF MANUAL      RBC COMMENTS ANISOCYTOSIS  1+       METABOLIC PANEL, BASIC    Collection Time: 03/04/23 12:39 AM   Result Value Ref Range    Sodium 134 (L) 136 - 145 mmol/L    Potassium 4.1 3.5 - 5.1 mmol/L    Chloride 99 97 - 108 mmol/L    CO2 28 21 - 32 mmol/L    Anion gap 7 5 - 15 mmol/L    Glucose 104 (H) 65 - 100 mg/dL    BUN 29 (H) 6 - 20 MG/DL    Creatinine 0.37 (L) 0.55 - 1.02 MG/DL    BUN/Creatinine ratio 78 (H) 12 - 20      eGFR >60 >60 ml/min/1.73m2    Calcium 9.8 8.5 - 10.1 MG/DL   MAGNESIUM    Collection Time: 03/04/23 12:39 AM   Result Value Ref Range    Magnesium 2.0 1.6 - 2.4 mg/dL       Available Xrays reviewed:    Chemotherapy monitored and toxicities assessed:    Assessment and Plan   CLL. .. off tx currently,  previously treated with rituxan, wbc 21K today (she is on dexamethasone)    Anemia. Valentina Vaca she has hx of AIHA treated with steroids/rituxan and steroid taper,  her labs this admit do not support active hemolysis. She does have iron def and completed IV iron on 3/3    Hypogammaglobulinemia. Valentina Vaca completed 3 day course of ivig on 3/3/23    Respiratory infection. . recent COVID  PNA now dealing with sequelae. . defer to primary team.  Herpetic facial infection.   On acyclovir    Sandy Langston MD

## 2023-03-04 NOTE — PROGRESS NOTES
I have reviewed discharge instructions with the patient and caregiver. The patient and caregiver verbalized understanding. Discharge medications reviewed with patient and caregiver and appropriate educational materials and side effects teaching were provided. Opportunity for questions was given.

## 2023-03-06 ENCOUNTER — PATIENT OUTREACH (OUTPATIENT)
Dept: CASE MANAGEMENT | Age: 88
End: 2023-03-06

## 2023-03-06 NOTE — PROGRESS NOTES
Pt.dtr-n-L politely decline Care Management, reports pt. has two nurses in the family, involve with pt.care. JOSE closed. -[address] Transitions Initial Call    Call within 2 business days of discharge: Yes     Patient Current Location: [place]    Patient: Elidia Mendoza Patient : 1933 MRN: [id number]    Last Discharge  [address]       Date Complaint Diagnosis Description Type Department Provider    [facility]-ac[facility] pneumonia . .. ED to Hosp-Admission (Discharged) (ADMIT) Fatou Parnell MD; MICHELLE Clark. Was this an external facility discharge? No     Challenges to be reviewed by the provider   Additional needs identified to be addressed with provider:              Discussed COVID-19 related testing which was not done at this time. Advance Care Planning:   Does patient have an Advance Directive:  no, Healthcare Decision Maker on file . Inpatient Readmission Risk score: Unplanned Readmit Risk Score: 20.2        START taking these medications     Details   acyclovir (ZOVIRAX) 200 mg capsule Take 2 Capsules by mouth four (4) times daily for 10 doses. Indications: a herpes simplex infection of the skin, mouth or genitals  Qty: 20 Capsule, Refills: 0       furosemide (LASIX) 20 mg tablet Take 1 Tablet by mouth daily for 30 days. Qty: 30 Tablet, Refills: 0         CONTINUE these medications which have CHANGED     Details   dexAMETHasone (DECADRON) 4 mg tablet Take 4 mg by mouth two (2) times daily (with meals) for 3 days. Qty: 6 Tablet, Refills: 0   STOP taking:  azithromycin 250 mg tablet (ZITHROMAX)  cefdinir 300 mg capsule (OMNICEF)     Home Health/Outpatient orders at discharge: home health care  [facility]: pt.dtr. following up [facility] service.   Date of initial visit:

## 2023-03-22 ENCOUNTER — TRANSCRIBE ORDER (OUTPATIENT)
Dept: SCHEDULING | Age: 88
End: 2023-03-22

## 2023-03-22 DIAGNOSIS — J18.9 PNEUMONIA DUE TO INFECTIOUS ORGANISM, UNSPECIFIED LATERALITY, UNSPECIFIED PART OF LUNG: Primary | ICD-10-CM

## 2023-04-22 ENCOUNTER — TRANSCRIBE ORDERS (OUTPATIENT)
Facility: HOSPITAL | Age: 88
End: 2023-04-22

## 2023-04-22 DIAGNOSIS — I27.20 PULMONARY HYPERTENSION (HCC): Primary | ICD-10-CM

## 2023-04-23 DIAGNOSIS — J18.9 PNEUMONIA DUE TO INFECTIOUS ORGANISM, UNSPECIFIED LATERALITY, UNSPECIFIED PART OF LUNG: Primary | ICD-10-CM

## 2023-05-31 ENCOUNTER — HOSPITAL ENCOUNTER (OUTPATIENT)
Facility: HOSPITAL | Age: 88
Discharge: HOME | End: 2023-06-02
Payer: MEDICARE

## 2023-05-31 VITALS — WEIGHT: 111.11 LBS | HEIGHT: 59 IN | BODY MASS INDEX: 22.4 KG/M2

## 2023-05-31 DIAGNOSIS — I27.20 PULMONARY HYPERTENSION (HCC): ICD-10-CM

## 2023-05-31 LAB
ECHO AO ROOT DIAM: 3 CM
ECHO AO ROOT INDEX: 2.08 CM/M2
ECHO AR MAX VEL PISA: 4.6 M/S
ECHO AV AREA PEAK VELOCITY: 1.5 CM2
ECHO AV AREA/BSA PEAK VELOCITY: 1 CM2/M2
ECHO AV PEAK GRADIENT: 15 MMHG
ECHO AV PEAK VELOCITY: 1.9 M/S
ECHO AV REGURGITANT PHT: 681.1 MILLISECOND
ECHO AV VELOCITY RATIO: 0.42
ECHO BSA: 1.45 M2
ECHO EST RA PRESSURE: 3 MMHG
ECHO LA DIAMETER INDEX: 3.13 CM/M2
ECHO LA DIAMETER: 4.5 CM
ECHO LA TO AORTIC ROOT RATIO: 1.5
ECHO LV FRACTIONAL SHORTENING: 33 % (ref 28–44)
ECHO LV INTERNAL DIMENSION DIASTOLE INDEX: 2.99 CM/M2
ECHO LV INTERNAL DIMENSION DIASTOLIC: 4.3 CM (ref 3.9–5.3)
ECHO LV INTERNAL DIMENSION SYSTOLIC INDEX: 2.01 CM/M2
ECHO LV INTERNAL DIMENSION SYSTOLIC: 2.9 CM
ECHO LV IVSD: 1.1 CM (ref 0.6–0.9)
ECHO LV MASS 2D: 162.9 G (ref 67–162)
ECHO LV MASS INDEX 2D: 113.2 G/M2 (ref 43–95)
ECHO LV POSTERIOR WALL DIASTOLIC: 1.1 CM (ref 0.6–0.9)
ECHO LV RELATIVE WALL THICKNESS RATIO: 0.51
ECHO LVOT AREA: 3.5 CM2
ECHO LVOT DIAM: 2.1 CM
ECHO LVOT PEAK GRADIENT: 3 MMHG
ECHO LVOT PEAK VELOCITY: 0.8 M/S
ECHO MV MAX VELOCITY: 0.9 M/S
ECHO MV MEAN GRADIENT: 1 MMHG
ECHO MV MEAN VELOCITY: 0.5 M/S
ECHO MV PEAK GRADIENT: 3 MMHG
ECHO MV REGURGITANT PEAK VELOCITY: 5 M/S
ECHO MV REGURGITANT PEAK VELOCITY: 5.2 M/S
ECHO MV REGURGITANT VTIA: 136 CM
ECHO MV VTI: 17.2 CM
ECHO RIGHT VENTRICULAR SYSTOLIC PRESSURE (RVSP): 23 MMHG
ECHO TV REGURGITANT MAX VELOCITY: 2.21 M/S
ECHO TV REGURGITANT PEAK GRADIENT: 20 MMHG

## 2023-05-31 PROCEDURE — 93325 DOPPLER ECHO COLOR FLOW MAPG: CPT

## 2023-10-16 ENCOUNTER — HOSPITAL ENCOUNTER (OUTPATIENT)
Age: 88
Discharge: HOME | End: 2023-10-19
Payer: MEDICARE

## 2023-10-16 DIAGNOSIS — J40 BRONCHITIS: ICD-10-CM

## 2023-10-16 PROCEDURE — 71046 X-RAY EXAM CHEST 2 VIEWS: CPT

## 2023-10-30 ENCOUNTER — HOSPITAL ENCOUNTER (OUTPATIENT)
Age: 88
Discharge: HOME | End: 2023-11-02
Payer: MEDICARE

## 2023-10-30 DIAGNOSIS — J18.9 PNEUMONIA OF BOTH LUNGS DUE TO INFECTIOUS ORGANISM, UNSPECIFIED PART OF LUNG: ICD-10-CM

## 2023-10-30 PROCEDURE — 71046 X-RAY EXAM CHEST 2 VIEWS: CPT

## 2023-11-17 ENCOUNTER — HOSPITAL ENCOUNTER (OUTPATIENT)
Facility: HOSPITAL | Age: 88
End: 2023-11-17
Attending: INTERNAL MEDICINE
Payer: MEDICARE

## 2023-11-17 VITALS
BODY MASS INDEX: 23.79 KG/M2 | SYSTOLIC BLOOD PRESSURE: 126 MMHG | WEIGHT: 118 LBS | DIASTOLIC BLOOD PRESSURE: 64 MMHG | HEIGHT: 59 IN | OXYGEN SATURATION: 98 % | RESPIRATION RATE: 20 BRPM | HEART RATE: 87 BPM | TEMPERATURE: 98 F

## 2023-11-17 DIAGNOSIS — J90 PLEURAL EFFUSION: ICD-10-CM

## 2023-11-17 PROCEDURE — 76604 US EXAM CHEST: CPT

## 2023-11-17 ASSESSMENT — PAIN - FUNCTIONAL ASSESSMENT: PAIN_FUNCTIONAL_ASSESSMENT: NONE - DENIES PAIN

## 2023-11-17 NOTE — PROGRESS NOTES
Pt arrives ambulatory with cane to angio department accompanied by son for thoracentesis procedure. All assessments completed and consent was reviewed. Education given was regarding procedure, no sedation, post-procedure care and  management/follow-up. Opportunity for questions was provided and all questions and concerns were addressed. Per, Hope, NP patient does not have enough fluid to safely drain at this time. Patient denies SOB or cough.

## 2023-12-07 ENCOUNTER — TRANSCRIBE ORDERS (OUTPATIENT)
Facility: HOSPITAL | Age: 88
End: 2023-12-07

## 2023-12-07 ENCOUNTER — HOSPITAL ENCOUNTER (OUTPATIENT)
Facility: HOSPITAL | Age: 88
Discharge: HOME | End: 2023-12-07
Attending: INTERNAL MEDICINE
Payer: MEDICARE

## 2023-12-07 DIAGNOSIS — J90 PLEURAL EFFUSION ON LEFT: Primary | ICD-10-CM

## 2023-12-07 DIAGNOSIS — J90 PLEURAL EFFUSION ON LEFT: ICD-10-CM

## 2023-12-07 PROCEDURE — 71046 X-RAY EXAM CHEST 2 VIEWS: CPT

## 2023-12-27 ENCOUNTER — TRANSCRIBE ORDERS (OUTPATIENT)
Facility: HOSPITAL | Age: 88
End: 2023-12-27

## 2023-12-27 ENCOUNTER — HOSPITAL ENCOUNTER (OUTPATIENT)
Facility: HOSPITAL | Age: 88
Discharge: HOME | End: 2023-12-30
Payer: MEDICARE

## 2023-12-27 DIAGNOSIS — J96.11 CHRONIC RESPIRATORY FAILURE WITH HYPOXIA (HCC): ICD-10-CM

## 2023-12-27 DIAGNOSIS — J96.11 CHRONIC RESPIRATORY FAILURE WITH HYPOXIA (HCC): Primary | ICD-10-CM

## 2023-12-27 PROCEDURE — 71046 X-RAY EXAM CHEST 2 VIEWS: CPT

## 2024-03-11 ENCOUNTER — HOSPITAL ENCOUNTER (OUTPATIENT)
Age: 89
Discharge: HOME | End: 2024-03-14
Payer: MEDICARE

## 2024-03-11 DIAGNOSIS — J90 PLEURAL EFFUSION: ICD-10-CM

## 2024-03-11 PROCEDURE — 71046 X-RAY EXAM CHEST 2 VIEWS: CPT

## 2024-10-08 NOTE — CONSULTS
[facility] Cardiology Consultation    Date of Consult:  02/04/23  Date of Admission: 2/2/2023  Primary Cardiologist: None  Physician Requesting consult: Dr. Charyl Jeans    Chief Complaint / Reason for Consult:   SVT    History of Present Illness:  Darline Oliveira is a 80 y.o. female with the below listed medical history who was admitted with pneumonia. From available notes:  \"80year-old  female with past medical history of CLL who follows with oncology and COVID diagnosed 1 month ago who was transferred here for the above. Patient reports that she has been having on and off fevers for about a week. She reports that these fevers are at night or in the morning and if ranged to a temperature max of 102 °F.  She reports she had COVID 1 month ago and was told by her primary doctor that if she had fevers and shortness of breath to go to an ER. She reports that she has been having increased cough along with her fevers, for about 3 weeks now. She denies any hemoptysis. She does report some productive cough since having had COVID. She and her family report that she has been started on doxycycline by her primary care provider for concern for pneumonia and possible UTI. She did have a CT yesterday which showed patchy airspace disease and bilateral pleural effusions. She reports increased shortness of breath with exertion but not at rest.  She denies any hypoxia, syncope, chest pain, palpitations. At the time of my exam, patient is laying comfortably in bed with family at bedside. No acute complaints at this time. Patient was found to have elevated BNP at 1062 and, as mentioned before, CT showing scarring versus inflammatory versus infectious and pleural effusion. \"    She has been noted to have brief runs of SVT up to the 180s. She is asymptomatic with this. She has been febrile. Denies h/o cardiac issues. She specifically denies arrhythmia or AF.   Denies palpitations, LH, chest discomfort \"Have you been to the ER, urgent care clinic since your last visit?  Hospitalized since your last visit?\"    NO    “Have you seen or consulted any other health care providers outside our system since your last visit?”    NO    Have you had a mammogram?”   NO    No breast cancer screening on file       “Have you had a diabetic eye exam?”    NO     No diabetic eye exam on file           or dyspnea. She is COVID positive here. Past Medical History:   Diagnosis Date    CLL (chronic lymphocytic leukemia) ([facility] [address]     History of chemotherapy     For chronic lymphacytic leukemia    Menopause 1986       Prior to Admission medications    Not on File       Current Facility-Administered Medications   Medication Dose Route Frequency    [Held by provider] furosemide (LASIX) injection 20 mg  20 mg IntraVENous BID    azithromycin (ZITHROMAX) 500 mg in 0.9% sodium chloride 250 mL ([id number])  500 mg IntraVENous Q24H    cefepime (MAXIPIME) 2 g in 0.9% sodium chloride (MBP/ADV) 100 mL MBP  2 g IntraVENous Q12H    losartan (COZAAR) tablet 100 mg  100 mg Oral DAILY    fenofibrate (LOFIBRA) tablet 160 mg  160 mg Oral DAILY    pantoprazole (PROTONIX) tablet 40 mg  40 mg Oral QHS    aspirin chewable tablet 81 mg  81 mg Oral DAILY    aluminum hydrox-magnesium carb (GAVISCON) oral suspension 15 mL  15 mL Oral QHS    predniSONE (DELTASONE) tablet 1 mg  1 mg Oral EVERY OTHER DAY    sodium chloride (NS) flush 5-40 mL  5-40 mL IntraVENous Q8H    sodium chloride (NS) flush 5-40 mL  5-40 mL IntraVENous PRN    acetaminophen (TYLENOL) tablet 650 mg  650 mg Oral Q6H PRN    Or    acetaminophen (TYLENOL) suppository 650 mg  650 mg Rectal Q6H PRN    ondansetron (ZOFRAN ODT) tablet 4 mg  4 mg Oral Q8H PRN    Or    ondansetron (ZOFRAN) injection 4 mg  4 mg IntraVENous Q6H PRN    enoxaparin (LOVENOX) injection 40 mg  40 mg SubCUTAneous DAILY    polyethylene glycol (MIRALAX) packet 17 g  17 g Oral QHS       History reviewed. No pertinent CV family history.     Social History     Socioeconomic History    Marital status:      Spouse name: Not on file    Number of children: Not on file    Years of education: Not on file    Highest education level: Not on file   Occupational History    Not on file   Tobacco Use    Smoking status: Never    Smokeless tobacco: Never   Vaping Use    Vaping Use: Not on file   Substance and Sexual Activity    Alcohol use: Not on file    Drug use: Not on file    Sexual activity: Not on file   Other Topics Concern    Not on file   Social History Narrative    Not on file     Social Determinants of Health     Financial Resource Strain: Not on file   Food Insecurity: Not on file   Transportation Needs: Not on file   Physical Activity: Not on file   Stress: Not on file   Social Connections: Not on file   Intimate Partner Violence: Not on file   Housing Stability: Not on file       Review of Systems   All other systems reviewed and are negative.     Visit Vitals  BP (!) 123/51 (BP 1 Location: Right upper arm, BP Patient Position: At rest)   Pulse (!) 107   Temp 100.1 °F (37.8 °C)   Resp 16   Ht 4' 11\" (1.499 m)   Wt 53.8 kg (118 lb 9.7 oz)   SpO2 92%   BMI 23.96 kg/m²       No intake or output data in the 24 hours ending 02/04/23 1620     Physical Exam  GEN: NAD, appears stated age  [de-identified]: EOMI  NECK: Normal JVP   CV: RRR, normal S1 and S2, II/VI systolic murmur at LSB  LUNGS: Diminished BS at b/l bases with bibasilar inspiratory crackles  ABD: Soft, NT/ND  EXT: No edema, 2+ and symmetrical radial pulses b/l  PSYCH: Mood and affect normal  NEURO: Alert, MAEW, face symmetrical, speech intact    Lab Review:  BMP:   Lab Results   Component Value Date/Time     (L) 02/04/2023 08:41 AM    K 3.9 02/04/2023 08:41 AM    CL 95 (L) 02/04/2023 08:41 AM    CO2 26 02/04/2023 08:41 AM    AGAP 7 02/04/2023 08:41 AM     (H) 02/04/2023 08:41 AM    BUN 10 02/04/2023 08:41 AM    CREA 0.58 02/04/2023 08:41 AM        CBC:  Lab Results   Component Value Date/Time    WBC 4.7 02/03/2023 10:29 AM    HGB 9.8 (L) 02/03/2023 10:29 AM    HCT 29.9 (L) 02/03/2023 10:29 AM    PLATELET 330 57/00/2720 10:29 AM    MCV 83.5 02/03/2023 10:29 AM       All Cardiac Markers in the last 24 hours:  No results found for: CPK, CK, CKMMB, CKMB, RCK3, CKMBT, CKMBPOC, CKNDX, CKND1, CHAIM, TROPT, TROIQ, KAREEN, TROPT, TNIPOC, BNP, BNPP, BNPNT    No results found for: CHOL, CHOLPOCT, CHOLX, CHLST, CHOLV, HDL, HDLPOC, HDLP, LDL, LDLCPOC, LDLC, DLDLP, VLDLC, VLDL, TGLX, TRIGL, TRIGP, TGLPOCT, CHHD, CHHDX     Data Review:  ECG tracing personally reviewed:   SR with brief run of pAT    Echocardiogram: N/A    Other cardiac testing: N/A    Other imaging:  CXR:  IMPRESSION  Interstitial edema pattern, new since prior study, with stable pleural effusions. .  . Assessment/Recommendations:    SVT  Likely paroxysmal atrial tachycardia  Start low dose beta blocker for suppression, metoprolol tartrate 12.5 mg BID  Reduce losartan to 50 mg daily to allow more BP room  No further CV testing or follow-up for this is necessary at this time  Telemetry  PNA  May have PNA on top of recent COVID-19 infection  Defer management to hospitalist  Recent COVID-19 infection  COVID positive here  Pleural effusions, pulmonary edema  Possible CHF, unclear if diastolic and/or systolic  NT pro-BNP modestly elevated for her age at 200  TTE pending  CLL  Hyponatremia  Seems euvolemic to mildly volume overloaded  Would DC IVF and only allow volume with IV antibiotics; would give diuretics to keep net even to mildly net negative  Anemia    Discussed with Dr. Edison Ramos. We will sign-off, but are available if additional questions arise. Thank you for the opportunity to participate in the care of Anastasia Chandler and please do not hesitate to contact us should you have any questions. Lan Thornton Peers, [address] [facility]  02/04/23

## 2024-11-04 ENCOUNTER — TRANSCRIBE ORDERS (OUTPATIENT)
Facility: HOSPITAL | Age: 89
End: 2024-11-04

## 2024-11-04 ENCOUNTER — HOSPITAL ENCOUNTER (OUTPATIENT)
Facility: HOSPITAL | Age: 89
Discharge: HOME | End: 2024-11-07
Attending: INTERNAL MEDICINE
Payer: MEDICARE

## 2024-11-04 DIAGNOSIS — J90 PLEURAL EFFUSION: ICD-10-CM

## 2024-11-04 DIAGNOSIS — J90 PLEURAL EFFUSION: Primary | ICD-10-CM

## 2024-11-04 PROCEDURE — 71046 X-RAY EXAM CHEST 2 VIEWS: CPT

## 2025-01-17 ENCOUNTER — HOSPITAL ENCOUNTER (OUTPATIENT)
Facility: HOSPITAL | Age: 89
Discharge: HOME | End: 2025-01-20
Payer: MEDICARE

## 2025-01-17 DIAGNOSIS — M81.0 AGE-RELATED OSTEOPOROSIS WITHOUT CURRENT PATHOLOGICAL FRACTURE: ICD-10-CM

## 2025-01-17 DIAGNOSIS — M85.9 DISORDER OF BONE DENSITY AND STRUCTURE, UNSPECIFIED: ICD-10-CM

## 2025-01-17 PROCEDURE — 77080 DXA BONE DENSITY AXIAL: CPT
